# Patient Record
Sex: MALE | Race: WHITE | Employment: OTHER | URBAN - NONMETROPOLITAN AREA
[De-identification: names, ages, dates, MRNs, and addresses within clinical notes are randomized per-mention and may not be internally consistent; named-entity substitution may affect disease eponyms.]

---

## 2023-01-01 ENCOUNTER — APPOINTMENT (OUTPATIENT)
Dept: NUCLEAR MEDICINE | Age: 88
DRG: 207 | End: 2023-01-01
Attending: INTERNAL MEDICINE
Payer: MEDICARE

## 2023-01-01 ENCOUNTER — APPOINTMENT (OUTPATIENT)
Dept: GENERAL RADIOLOGY | Age: 88
DRG: 207 | End: 2023-01-01
Attending: INTERNAL MEDICINE
Payer: MEDICARE

## 2023-01-01 ENCOUNTER — APPOINTMENT (OUTPATIENT)
Dept: ULTRASOUND IMAGING | Age: 88
DRG: 207 | End: 2023-01-01
Attending: INTERNAL MEDICINE
Payer: MEDICARE

## 2023-01-01 ENCOUNTER — APPOINTMENT (OUTPATIENT)
Dept: CT IMAGING | Age: 88
DRG: 207 | End: 2023-01-01
Attending: INTERNAL MEDICINE
Payer: MEDICARE

## 2023-01-01 ENCOUNTER — APPOINTMENT (OUTPATIENT)
Dept: INTERVENTIONAL RADIOLOGY/VASCULAR | Age: 88
DRG: 207 | End: 2023-01-01
Attending: INTERNAL MEDICINE
Payer: MEDICARE

## 2023-01-01 ENCOUNTER — HOSPITAL ENCOUNTER (INPATIENT)
Age: 88
LOS: 25 days | Discharge: NURSING, REHAB OR LONG-TERM CARE | DRG: 207 | End: 2024-01-02
Attending: INTERNAL MEDICINE | Admitting: INTERNAL MEDICINE
Payer: MEDICARE

## 2023-01-01 DIAGNOSIS — I50.23 ACUTE ON CHRONIC HFREF (HEART FAILURE WITH REDUCED EJECTION FRACTION) (HCC): ICD-10-CM

## 2023-01-01 DIAGNOSIS — J96.01 ACUTE HYPOXIC RESPIRATORY FAILURE (HCC): Primary | ICD-10-CM

## 2023-01-01 DIAGNOSIS — J96.01 ACUTE RESPIRATORY FAILURE WITH HYPOXIA (HCC): ICD-10-CM

## 2023-01-01 LAB
ABO: NORMAL
ABO: NORMAL
ACINETOBACTER CALCOACETICUS-BAUMANNII BY PCR: NOT DETECTED
ACINETOBACTER CALCOACETICUS-BAUMANNII BY PCR: NOT DETECTED
ALBUMIN SERPL BCG-MCNC: 2.4 G/DL (ref 3.5–5.1)
ALBUMIN SERPL BCG-MCNC: 2.5 G/DL (ref 3.5–5.1)
ALBUMIN SERPL BCG-MCNC: 2.8 G/DL (ref 3.5–5.1)
ALDOLASE SERPL-CCNC: 8.9 U/L (ref 1.2–7.6)
ALP SERPL-CCNC: 63 U/L (ref 38–126)
ALP SERPL-CCNC: 87 U/L (ref 38–126)
ALT SERPL W/O P-5'-P-CCNC: 7 U/L (ref 11–66)
ALT SERPL W/O P-5'-P-CCNC: < 5 U/L (ref 11–66)
AMORPH SED URNS QL MICRO: ABNORMAL
ANCA AB PATTERN SER IF-IMP: NORMAL
ANCA AB PATTERN SER IF-IMP: NORMAL
ANCA IGG TITR SER IF: NORMAL {TITER}
ANCA IGG TITR SER IF: NORMAL {TITER}
ANION GAP SERPL CALC-SCNC: 10 MEQ/L (ref 8–16)
ANION GAP SERPL CALC-SCNC: 11 MEQ/L (ref 8–16)
ANION GAP SERPL CALC-SCNC: 12 MEQ/L (ref 8–16)
ANION GAP SERPL CALC-SCNC: 13 MEQ/L (ref 8–16)
ANION GAP SERPL CALC-SCNC: 14 MEQ/L (ref 8–16)
ANION GAP SERPL CALC-SCNC: 14 MEQ/L (ref 8–16)
ANION GAP SERPL CALC-SCNC: 15 MEQ/L (ref 8–16)
ANION GAP SERPL CALC-SCNC: 16 MEQ/L (ref 8–16)
ANION GAP SERPL CALC-SCNC: 7 MEQ/L (ref 8–16)
ANION GAP SERPL CALC-SCNC: 9 MEQ/L (ref 8–16)
ANION GAP SERPL CALC-SCNC: 9 MEQ/L (ref 8–16)
ANTIBODY SCREEN: NORMAL
ANTIBODY SCREEN: NORMAL
APTT PPP: 106.2 SECONDS (ref 22–38)
APTT PPP: 113.4 SECONDS (ref 22–38)
APTT PPP: 113.9 SECONDS (ref 22–38)
APTT PPP: 117.5 SECONDS (ref 22–38)
APTT PPP: 26.2 SECONDS (ref 22–38)
APTT PPP: 34.7 SECONDS (ref 22–38)
APTT PPP: 37.1 SECONDS (ref 22–38)
APTT PPP: 45 SECONDS (ref 22–38)
APTT PPP: 48 SECONDS (ref 22–38)
APTT PPP: 48.4 SECONDS (ref 22–38)
APTT PPP: 49.6 SECONDS (ref 22–38)
APTT PPP: 50.2 SECONDS (ref 22–38)
APTT PPP: 50.9 SECONDS (ref 22–38)
APTT PPP: 51.6 SECONDS (ref 22–38)
APTT PPP: 62.5 SECONDS (ref 22–38)
APTT PPP: 63.5 SECONDS (ref 22–38)
APTT PPP: 65.6 SECONDS (ref 22–38)
APTT PPP: 68.5 SECONDS (ref 22–38)
APTT PPP: 68.7 SECONDS (ref 22–38)
APTT PPP: 68.8 SECONDS (ref 22–38)
APTT PPP: 70.9 SECONDS (ref 22–38)
APTT PPP: 72.8 SECONDS (ref 22–38)
APTT PPP: 75 SECONDS (ref 22–38)
APTT PPP: 77.7 SECONDS (ref 22–38)
APTT PPP: 79.3 SECONDS (ref 22–38)
APTT PPP: 81.9 SECONDS (ref 22–38)
APTT PPP: 83 SECONDS (ref 22–38)
APTT PPP: 84.8 SECONDS (ref 22–38)
APTT PPP: 85.4 SECONDS (ref 22–38)
APTT PPP: 87.8 SECONDS (ref 22–38)
APTT PPP: 88.9 SECONDS (ref 22–38)
APTT PPP: 89.1 SECONDS (ref 22–38)
APTT PPP: 91 SECONDS (ref 22–38)
APTT PPP: 91.4 SECONDS (ref 22–38)
APTT PPP: 94.5 SECONDS (ref 22–38)
APTT PPP: 95 SECONDS (ref 22–38)
ARTERIAL PATENCY WRIST A: POSITIVE
AST SERPL-CCNC: 15 U/L (ref 5–40)
AST SERPL-CCNC: 9 U/L (ref 5–40)
B-OH-BUTYR SERPL-MSCNC: 1.59 MG/DL (ref 0.2–2.81)
BACTERIA BLD AEROBE CULT: NORMAL
BACTERIA BLD AEROBE CULT: NORMAL
BACTERIA SPEC AEROBE CULT: NORMAL
BACTERIA SPEC RESP CULT: ABNORMAL
BACTERIA: ABNORMAL
BASE EXCESS BLDA CALC-SCNC: 1.4 MMOL/L (ref -2.5–2.5)
BASE EXCESS BLDA CALC-SCNC: 1.8 MMOL/L (ref -2.5–2.5)
BASE EXCESS BLDA CALC-SCNC: 3.1 MMOL/L (ref -2.5–2.5)
BASOPHILS ABSOLUTE: 0 THOU/MM3 (ref 0–0.1)
BASOPHILS ABSOLUTE: 0.1 THOU/MM3 (ref 0–0.1)
BASOPHILS NFR BLD AUTO: 0.1 %
BASOPHILS NFR BLD AUTO: 0.1 %
BASOPHILS NFR BLD AUTO: 0.3 %
BASOPHILS NFR BLD AUTO: 0.4 %
BASOPHILS NFR BLD AUTO: 0.5 %
BDY SITE: ABNORMAL
BILIRUB CONJ SERPL-MCNC: < 0.2 MG/DL (ref 0–0.3)
BILIRUB SERPL-MCNC: 0.2 MG/DL (ref 0.3–1.2)
BILIRUB SERPL-MCNC: 0.3 MG/DL (ref 0.3–1.2)
BILIRUB UR QL STRIP: NEGATIVE
BLACTX-M ISLT/SPM QL: NOT DETECTED
BLACTX-M ISLT/SPM QL: NOT DETECTED
BLAIMP ISLT/SPM QL: NOT DETECTED
BLAIMP ISLT/SPM QL: NOT DETECTED
BLAKPC ISLT/SPM QL: NOT DETECTED
BLAKPC ISLT/SPM QL: NOT DETECTED
BLAOXA-48-LIKE ISLT/SPM QL: NOT DETECTED
BLAOXA-48-LIKE ISLT/SPM QL: NOT DETECTED
BLAVIM ISLT/SPM QL: NOT DETECTED
BLAVIM ISLT/SPM QL: NOT DETECTED
BUN SERPL-MCNC: 62 MG/DL (ref 7–22)
BUN SERPL-MCNC: 67 MG/DL (ref 7–22)
BUN SERPL-MCNC: 69 MG/DL (ref 7–22)
BUN SERPL-MCNC: 71 MG/DL (ref 7–22)
BUN SERPL-MCNC: 71 MG/DL (ref 7–22)
BUN SERPL-MCNC: 74 MG/DL (ref 7–22)
BUN SERPL-MCNC: 74 MG/DL (ref 7–22)
BUN SERPL-MCNC: 75 MG/DL (ref 7–22)
BUN SERPL-MCNC: 75 MG/DL (ref 7–22)
BUN SERPL-MCNC: 76 MG/DL (ref 7–22)
BUN SERPL-MCNC: 78 MG/DL (ref 7–22)
BUN SERPL-MCNC: 78 MG/DL (ref 7–22)
BUN SERPL-MCNC: 79 MG/DL (ref 7–22)
BUN SERPL-MCNC: 80 MG/DL (ref 7–22)
BUN SERPL-MCNC: 80 MG/DL (ref 7–22)
BUN SERPL-MCNC: 81 MG/DL (ref 7–22)
BUN SERPL-MCNC: 81 MG/DL (ref 7–22)
BUN SERPL-MCNC: 83 MG/DL (ref 7–22)
BUN SERPL-MCNC: 84 MG/DL (ref 7–22)
BUN SERPL-MCNC: 84 MG/DL (ref 7–22)
BUN SERPL-MCNC: 85 MG/DL (ref 7–22)
BUN SERPL-MCNC: 86 MG/DL (ref 7–22)
BUN SERPL-MCNC: 87 MG/DL (ref 7–22)
BUN SERPL-MCNC: 88 MG/DL (ref 7–22)
BUN SERPL-MCNC: 88 MG/DL (ref 7–22)
BUN SERPL-MCNC: 90 MG/DL (ref 7–22)
C PNEUM DNA LOWER RESP QL NAA+NON-PROBE: NOT DETECTED
C PNEUM DNA LOWER RESP QL NAA+NON-PROBE: NOT DETECTED
C3C SERPL-MCNC: 104 MG/DL (ref 90–180)
C4 SERPL-MCNC: 31 MG/DL (ref 10–40)
CA-I BLD ISE-SCNC: 0.92 MMOL/L (ref 1.12–1.32)
CA-I BLD ISE-SCNC: 0.98 MMOL/L (ref 1.12–1.32)
CA-I BLD ISE-SCNC: 0.99 MMOL/L (ref 1.12–1.32)
CA-I BLD ISE-SCNC: 1 MMOL/L (ref 1.12–1.32)
CA-I BLD ISE-SCNC: 1.01 MMOL/L (ref 1.12–1.32)
CA-I BLD ISE-SCNC: 1.04 MMOL/L (ref 1.12–1.32)
CA-I BLD ISE-SCNC: 1.07 MMOL/L (ref 1.12–1.32)
CA-I BLD ISE-SCNC: 1.08 MMOL/L (ref 1.12–1.32)
CA-I BLD ISE-SCNC: 1.09 MMOL/L (ref 1.12–1.32)
CA-I BLD ISE-SCNC: 1.09 MMOL/L (ref 1.12–1.32)
CALCIUM SERPL-MCNC: 7.3 MG/DL (ref 8.5–10.5)
CALCIUM SERPL-MCNC: 7.4 MG/DL (ref 8.5–10.5)
CALCIUM SERPL-MCNC: 7.5 MG/DL (ref 8.5–10.5)
CALCIUM SERPL-MCNC: 7.6 MG/DL (ref 8.5–10.5)
CALCIUM SERPL-MCNC: 7.7 MG/DL (ref 8.5–10.5)
CALCIUM SERPL-MCNC: 7.8 MG/DL (ref 8.5–10.5)
CALCIUM SERPL-MCNC: 7.9 MG/DL (ref 8.5–10.5)
CALCIUM SERPL-MCNC: 8 MG/DL (ref 8.5–10.5)
CALCIUM SERPL-MCNC: 8.2 MG/DL (ref 8.5–10.5)
CALCIUM SERPL-MCNC: 8.3 MG/DL (ref 8.5–10.5)
CASTS #/AREA URNS LPF: ABNORMAL /LPF
CHARACTER UR: CLEAR
CHARCOAL URNS QL MICRO: ABNORMAL
CHARCOAL URNS QL MICRO: ABNORMAL
CHLORIDE 24H UR-SRATE: < 20 MEQ/L
CHLORIDE SERPL-SCNC: 101 MEQ/L (ref 98–111)
CHLORIDE SERPL-SCNC: 102 MEQ/L (ref 98–111)
CHLORIDE SERPL-SCNC: 102 MEQ/L (ref 98–111)
CHLORIDE SERPL-SCNC: 104 MEQ/L (ref 98–111)
CHLORIDE SERPL-SCNC: 105 MEQ/L (ref 98–111)
CHLORIDE SERPL-SCNC: 105 MEQ/L (ref 98–111)
CHLORIDE SERPL-SCNC: 106 MEQ/L (ref 98–111)
CHLORIDE SERPL-SCNC: 106 MEQ/L (ref 98–111)
CHLORIDE SERPL-SCNC: 107 MEQ/L (ref 98–111)
CHLORIDE SERPL-SCNC: 108 MEQ/L (ref 98–111)
CHLORIDE SERPL-SCNC: 109 MEQ/L (ref 98–111)
CHLORIDE SERPL-SCNC: 93 MEQ/L (ref 98–111)
CHLORIDE SERPL-SCNC: 94 MEQ/L (ref 98–111)
CHLORIDE SERPL-SCNC: 94 MEQ/L (ref 98–111)
CHLORIDE SERPL-SCNC: 96 MEQ/L (ref 98–111)
CHLORIDE SERPL-SCNC: 96 MEQ/L (ref 98–111)
CHLORIDE SERPL-SCNC: 98 MEQ/L (ref 98–111)
CHLORIDE SERPL-SCNC: 98 MEQ/L (ref 98–111)
CK SERPL-CCNC: 43 U/L (ref 55–170)
CK SERPL-CCNC: 53 U/L (ref 55–170)
CO2 SERPL-SCNC: 23 MEQ/L (ref 23–33)
CO2 SERPL-SCNC: 23 MEQ/L (ref 23–33)
CO2 SERPL-SCNC: 24 MEQ/L (ref 23–33)
CO2 SERPL-SCNC: 25 MEQ/L (ref 23–33)
CO2 SERPL-SCNC: 26 MEQ/L (ref 23–33)
CO2 SERPL-SCNC: 27 MEQ/L (ref 23–33)
CO2 SERPL-SCNC: 28 MEQ/L (ref 23–33)
CO2 SERPL-SCNC: 29 MEQ/L (ref 23–33)
CO2 SERPL-SCNC: 30 MEQ/L (ref 23–33)
CO2 SERPL-SCNC: 31 MEQ/L (ref 23–33)
CO2 SERPL-SCNC: 31 MEQ/L (ref 23–33)
COLLECTED BY:: ABNORMAL
COLOR UR: YELLOW
CREAT SERPL-MCNC: 2.4 MG/DL (ref 0.4–1.2)
CREAT SERPL-MCNC: 2.4 MG/DL (ref 0.4–1.2)
CREAT SERPL-MCNC: 2.5 MG/DL (ref 0.4–1.2)
CREAT SERPL-MCNC: 2.5 MG/DL (ref 0.4–1.2)
CREAT SERPL-MCNC: 2.6 MG/DL (ref 0.4–1.2)
CREAT SERPL-MCNC: 2.7 MG/DL (ref 0.4–1.2)
CREAT SERPL-MCNC: 2.8 MG/DL (ref 0.4–1.2)
CREAT SERPL-MCNC: 2.9 MG/DL (ref 0.4–1.2)
CREAT SERPL-MCNC: 3 MG/DL (ref 0.4–1.2)
CREAT SERPL-MCNC: 3.1 MG/DL (ref 0.4–1.2)
CREAT SERPL-MCNC: 3.2 MG/DL (ref 0.4–1.2)
CREAT SERPL-MCNC: 3.3 MG/DL (ref 0.4–1.2)
CREAT SERPL-MCNC: 3.4 MG/DL (ref 0.4–1.2)
CREAT SERPL-MCNC: 3.6 MG/DL (ref 0.4–1.2)
CREAT SERPL-MCNC: 3.6 MG/DL (ref 0.4–1.2)
CREAT UR-MCNC: 59.3 MG/DL
CREAT UR-MCNC: 61.9 MG/DL
CREAT UR-MCNC: 61.9 MG/DL
CRYSTALS URNS QL MICRO: ABNORMAL
D DIMER PPP IA.FEU-MCNC: 3732 NG/ML FEU (ref 0–500)
DEPRECATED MEAN GLUCOSE BLD GHB EST-ACNC: 123 MG/DL (ref 70–126)
DEPRECATED RDW RBC AUTO: 49.8 FL (ref 35–45)
DEPRECATED RDW RBC AUTO: 50.1 FL (ref 35–45)
DEPRECATED RDW RBC AUTO: 50.7 FL (ref 35–45)
DEPRECATED RDW RBC AUTO: 51 FL (ref 35–45)
DEPRECATED RDW RBC AUTO: 51.1 FL (ref 35–45)
DEPRECATED RDW RBC AUTO: 51.4 FL (ref 35–45)
DEPRECATED RDW RBC AUTO: 51.6 FL (ref 35–45)
DEPRECATED RDW RBC AUTO: 51.8 FL (ref 35–45)
DEPRECATED RDW RBC AUTO: 53 FL (ref 35–45)
DEPRECATED RDW RBC AUTO: 53.3 FL (ref 35–45)
DEPRECATED RDW RBC AUTO: 53.9 FL (ref 35–45)
DEPRECATED RDW RBC AUTO: 54 FL (ref 35–45)
DEPRECATED RDW RBC AUTO: 54.1 FL (ref 35–45)
DEPRECATED RDW RBC AUTO: 54.1 FL (ref 35–45)
DEPRECATED RDW RBC AUTO: 54.2 FL (ref 35–45)
DEPRECATED RDW RBC AUTO: 54.3 FL (ref 35–45)
DEPRECATED RDW RBC AUTO: 55.6 FL (ref 35–45)
DEPRECATED RDW RBC AUTO: 56 FL (ref 35–45)
DEPRECATED RDW RBC AUTO: 57.5 FL (ref 35–45)
DEPRECATED RDW RBC AUTO: 57.8 FL (ref 35–45)
DEPRECATED RDW RBC AUTO: 57.9 FL (ref 35–45)
DEPRECATED RDW RBC AUTO: 58.4 FL (ref 35–45)
DEPRECATED RDW RBC AUTO: 58.4 FL (ref 35–45)
DEPRECATED RDW RBC AUTO: 59.4 FL (ref 35–45)
DEPRECATED RDW RBC AUTO: 59.9 FL (ref 35–45)
DEPRECATED RDW RBC AUTO: 61.1 FL (ref 35–45)
DEVICE: ABNORMAL
EKG ATRIAL RATE: 69 BPM
EKG ATRIAL RATE: 70 BPM
EKG ATRIAL RATE: 76 BPM
EKG ATRIAL RATE: 86 BPM
EKG ATRIAL RATE: 87 BPM
EKG P AXIS: -23 DEGREES
EKG P AXIS: 33 DEGREES
EKG P AXIS: 33 DEGREES
EKG P AXIS: 39 DEGREES
EKG P AXIS: 46 DEGREES
EKG P-R INTERVAL: 146 MS
EKG P-R INTERVAL: 150 MS
EKG P-R INTERVAL: 156 MS
EKG P-R INTERVAL: 162 MS
EKG P-R INTERVAL: 168 MS
EKG Q-T INTERVAL: 386 MS
EKG Q-T INTERVAL: 396 MS
EKG Q-T INTERVAL: 408 MS
EKG Q-T INTERVAL: 432 MS
EKG Q-T INTERVAL: 444 MS
EKG Q-T INTERVAL: 450 MS
EKG QRS DURATION: 100 MS
EKG QRS DURATION: 108 MS
EKG QRS DURATION: 98 MS
EKG QTC CALCULATION (BAZETT): 436 MS
EKG QTC CALCULATION (BAZETT): 466 MS
EKG QTC CALCULATION (BAZETT): 473 MS
EKG QTC CALCULATION (BAZETT): 482 MS
EKG QTC CALCULATION (BAZETT): 490 MS
EKG QTC CALCULATION (BAZETT): 499 MS
EKG R AXIS: 18 DEGREES
EKG R AXIS: 23 DEGREES
EKG R AXIS: 29 DEGREES
EKG R AXIS: 37 DEGREES
EKG R AXIS: 47 DEGREES
EKG R AXIS: 50 DEGREES
EKG T AXIS: 46 DEGREES
EKG T AXIS: 51 DEGREES
EKG T AXIS: 52 DEGREES
EKG T AXIS: 53 DEGREES
EKG T AXIS: 63 DEGREES
EKG T AXIS: 64 DEGREES
EKG VENTRICULAR RATE: 69 BPM
EKG VENTRICULAR RATE: 70 BPM
EKG VENTRICULAR RATE: 76 BPM
EKG VENTRICULAR RATE: 77 BPM
EKG VENTRICULAR RATE: 86 BPM
EKG VENTRICULAR RATE: 87 BPM
ENA JO1 IGG SER-ACNC: 1 AU/ML (ref 0–40)
ENTEROBACTER CLOACAE COMPLEX BY PCR: NOT DETECTED
ENTEROBACTER CLOACAE COMPLEX BY PCR: NOT DETECTED
EOSINOPHIL NFR BLD AUTO: 0.1 %
EOSINOPHIL NFR BLD AUTO: 0.4 %
EOSINOPHIL NFR BLD AUTO: 0.7 %
EOSINOPHIL NFR BLD AUTO: 1 %
EOSINOPHIL NFR BLD AUTO: 1.6 %
EOSINOPHIL NFR BLD AUTO: 3.2 %
EOSINOPHIL NFR BLD AUTO: 3.3 %
EOSINOPHIL NFR BLD AUTO: 3.5 %
EOSINOPHIL NFR BLD AUTO: 3.9 %
EOSINOPHIL NFR BLD AUTO: 4 %
EOSINOPHIL NFR BLD AUTO: 4.5 %
EOSINOPHIL NFR BLD AUTO: 4.5 %
EOSINOPHIL SMEAR, URINE: NORMAL
EOSINOPHILS ABSOLUTE: 0 THOU/MM3 (ref 0–0.4)
EOSINOPHILS ABSOLUTE: 0.1 THOU/MM3 (ref 0–0.4)
EOSINOPHILS ABSOLUTE: 0.2 THOU/MM3 (ref 0–0.4)
EOSINOPHILS ABSOLUTE: 0.4 THOU/MM3 (ref 0–0.4)
EOSINOPHILS ABSOLUTE: 0.4 THOU/MM3 (ref 0–0.4)
EOSINOPHILS ABSOLUTE: 0.5 THOU/MM3 (ref 0–0.4)
EPITHELIAL CELLS, UA: ABNORMAL /HPF
ERYTHROCYTE [DISTWIDTH] IN BLOOD BY AUTOMATED COUNT: 15.3 % (ref 11.5–14.5)
ERYTHROCYTE [DISTWIDTH] IN BLOOD BY AUTOMATED COUNT: 15.4 % (ref 11.5–14.5)
ERYTHROCYTE [DISTWIDTH] IN BLOOD BY AUTOMATED COUNT: 15.6 % (ref 11.5–14.5)
ERYTHROCYTE [DISTWIDTH] IN BLOOD BY AUTOMATED COUNT: 15.7 % (ref 11.5–14.5)
ERYTHROCYTE [DISTWIDTH] IN BLOOD BY AUTOMATED COUNT: 15.8 % (ref 11.5–14.5)
ERYTHROCYTE [DISTWIDTH] IN BLOOD BY AUTOMATED COUNT: 16.1 % (ref 11.5–14.5)
ERYTHROCYTE [DISTWIDTH] IN BLOOD BY AUTOMATED COUNT: 16.1 % (ref 11.5–14.5)
ERYTHROCYTE [DISTWIDTH] IN BLOOD BY AUTOMATED COUNT: 16.2 % (ref 11.5–14.5)
ERYTHROCYTE [DISTWIDTH] IN BLOOD BY AUTOMATED COUNT: 16.4 % (ref 11.5–14.5)
ERYTHROCYTE [DISTWIDTH] IN BLOOD BY AUTOMATED COUNT: 16.4 % (ref 11.5–14.5)
ERYTHROCYTE [DISTWIDTH] IN BLOOD BY AUTOMATED COUNT: 16.7 % (ref 11.5–14.5)
ERYTHROCYTE [DISTWIDTH] IN BLOOD BY AUTOMATED COUNT: 16.7 % (ref 11.5–14.5)
ERYTHROCYTE [DISTWIDTH] IN BLOOD BY AUTOMATED COUNT: 16.8 % (ref 11.5–14.5)
ERYTHROCYTE [DISTWIDTH] IN BLOOD BY AUTOMATED COUNT: 17.2 % (ref 11.5–14.5)
ERYTHROCYTE [DISTWIDTH] IN BLOOD BY AUTOMATED COUNT: 17.2 % (ref 11.5–14.5)
ERYTHROCYTE [DISTWIDTH] IN BLOOD BY AUTOMATED COUNT: 17.3 % (ref 11.5–14.5)
ERYTHROCYTE [DISTWIDTH] IN BLOOD BY AUTOMATED COUNT: 17.4 % (ref 11.5–14.5)
ERYTHROCYTE [DISTWIDTH] IN BLOOD BY AUTOMATED COUNT: 17.5 % (ref 11.5–14.5)
ERYTHROCYTE [DISTWIDTH] IN BLOOD BY AUTOMATED COUNT: 17.6 % (ref 11.5–14.5)
ERYTHROCYTE [DISTWIDTH] IN BLOOD BY AUTOMATED COUNT: 17.7 % (ref 11.5–14.5)
ERYTHROCYTE [DISTWIDTH] IN BLOOD BY AUTOMATED COUNT: 17.7 % (ref 11.5–14.5)
ERYTHROCYTE [SEDIMENTATION RATE] IN BLOOD BY WESTERGREN METHOD: 125 MM/HR (ref 0–10)
ESCHERICHIA COLI BY PCR: DETECTED
ESCHERICHIA COLI BY PCR: DETECTED
FIO2 ON VENT O2 ANALYZER: 15 %
FIO2 ON VENT O2 ANALYZER: 80 %
FIO2 ON VENT O2 ANALYZER: 80 %
FLUAV RNA LOWER RESP QL NAA+NON-PROBE: NOT DETECTED
FLUAV RNA LOWER RESP QL NAA+NON-PROBE: NOT DETECTED
FLUAV RNA RESP QL NAA+PROBE: NOT DETECTED
FLUBV RNA LOWER RESP QL NAA+NON-PROBE: NOT DETECTED
FLUBV RNA LOWER RESP QL NAA+NON-PROBE: NOT DETECTED
FLUBV RNA RESP QL NAA+PROBE: NOT DETECTED
GFR SERPL CREATININE-BSD FRML MDRD: 15 ML/MIN/1.73M2
GFR SERPL CREATININE-BSD FRML MDRD: 15 ML/MIN/1.73M2
GFR SERPL CREATININE-BSD FRML MDRD: 17 ML/MIN/1.73M2
GFR SERPL CREATININE-BSD FRML MDRD: 18 ML/MIN/1.73M2
GFR SERPL CREATININE-BSD FRML MDRD: 19 ML/MIN/1.73M2
GFR SERPL CREATININE-BSD FRML MDRD: 20 ML/MIN/1.73M2
GFR SERPL CREATININE-BSD FRML MDRD: 21 ML/MIN/1.73M2
GFR SERPL CREATININE-BSD FRML MDRD: 22 ML/MIN/1.73M2
GFR SERPL CREATININE-BSD FRML MDRD: 23 ML/MIN/1.73M2
GFR SERPL CREATININE-BSD FRML MDRD: 24 ML/MIN/1.73M2
GFR SERPL CREATININE-BSD FRML MDRD: 24 ML/MIN/1.73M2
GFR SERPL CREATININE-BSD FRML MDRD: 25 ML/MIN/1.73M2
GFR SERPL CREATININE-BSD FRML MDRD: 25 ML/MIN/1.73M2
GLOMERULAR BASEMENT MEMBRANE ANTIBODY: NORMAL
GLUCOSE BLD STRIP.AUTO-MCNC: 110 MG/DL (ref 70–108)
GLUCOSE BLD STRIP.AUTO-MCNC: 110 MG/DL (ref 70–108)
GLUCOSE BLD STRIP.AUTO-MCNC: 123 MG/DL (ref 70–108)
GLUCOSE BLD STRIP.AUTO-MCNC: 132 MG/DL (ref 70–108)
GLUCOSE BLD STRIP.AUTO-MCNC: 133 MG/DL (ref 70–108)
GLUCOSE BLD STRIP.AUTO-MCNC: 134 MG/DL (ref 70–108)
GLUCOSE BLD STRIP.AUTO-MCNC: 136 MG/DL (ref 70–108)
GLUCOSE BLD STRIP.AUTO-MCNC: 137 MG/DL (ref 70–108)
GLUCOSE BLD STRIP.AUTO-MCNC: 138 MG/DL (ref 70–108)
GLUCOSE BLD STRIP.AUTO-MCNC: 140 MG/DL (ref 70–108)
GLUCOSE BLD STRIP.AUTO-MCNC: 140 MG/DL (ref 70–108)
GLUCOSE BLD STRIP.AUTO-MCNC: 141 MG/DL (ref 70–108)
GLUCOSE BLD STRIP.AUTO-MCNC: 147 MG/DL (ref 70–108)
GLUCOSE BLD STRIP.AUTO-MCNC: 148 MG/DL (ref 70–108)
GLUCOSE BLD STRIP.AUTO-MCNC: 149 MG/DL (ref 70–108)
GLUCOSE BLD STRIP.AUTO-MCNC: 150 MG/DL (ref 70–108)
GLUCOSE BLD STRIP.AUTO-MCNC: 151 MG/DL (ref 70–108)
GLUCOSE BLD STRIP.AUTO-MCNC: 151 MG/DL (ref 70–108)
GLUCOSE BLD STRIP.AUTO-MCNC: 154 MG/DL (ref 70–108)
GLUCOSE BLD STRIP.AUTO-MCNC: 155 MG/DL (ref 70–108)
GLUCOSE BLD STRIP.AUTO-MCNC: 157 MG/DL (ref 70–108)
GLUCOSE BLD STRIP.AUTO-MCNC: 159 MG/DL (ref 70–108)
GLUCOSE BLD STRIP.AUTO-MCNC: 159 MG/DL (ref 70–108)
GLUCOSE BLD STRIP.AUTO-MCNC: 161 MG/DL (ref 70–108)
GLUCOSE BLD STRIP.AUTO-MCNC: 162 MG/DL (ref 70–108)
GLUCOSE BLD STRIP.AUTO-MCNC: 162 MG/DL (ref 70–108)
GLUCOSE BLD STRIP.AUTO-MCNC: 163 MG/DL (ref 70–108)
GLUCOSE BLD STRIP.AUTO-MCNC: 167 MG/DL (ref 70–108)
GLUCOSE BLD STRIP.AUTO-MCNC: 167 MG/DL (ref 70–108)
GLUCOSE BLD STRIP.AUTO-MCNC: 168 MG/DL (ref 70–108)
GLUCOSE BLD STRIP.AUTO-MCNC: 170 MG/DL (ref 70–108)
GLUCOSE BLD STRIP.AUTO-MCNC: 170 MG/DL (ref 70–108)
GLUCOSE BLD STRIP.AUTO-MCNC: 171 MG/DL (ref 70–108)
GLUCOSE BLD STRIP.AUTO-MCNC: 172 MG/DL (ref 70–108)
GLUCOSE BLD STRIP.AUTO-MCNC: 173 MG/DL (ref 70–108)
GLUCOSE BLD STRIP.AUTO-MCNC: 174 MG/DL (ref 70–108)
GLUCOSE BLD STRIP.AUTO-MCNC: 174 MG/DL (ref 70–108)
GLUCOSE BLD STRIP.AUTO-MCNC: 175 MG/DL (ref 70–108)
GLUCOSE BLD STRIP.AUTO-MCNC: 176 MG/DL (ref 70–108)
GLUCOSE BLD STRIP.AUTO-MCNC: 177 MG/DL (ref 70–108)
GLUCOSE BLD STRIP.AUTO-MCNC: 178 MG/DL (ref 70–108)
GLUCOSE BLD STRIP.AUTO-MCNC: 179 MG/DL (ref 70–108)
GLUCOSE BLD STRIP.AUTO-MCNC: 179 MG/DL (ref 70–108)
GLUCOSE BLD STRIP.AUTO-MCNC: 181 MG/DL (ref 70–108)
GLUCOSE BLD STRIP.AUTO-MCNC: 181 MG/DL (ref 70–108)
GLUCOSE BLD STRIP.AUTO-MCNC: 182 MG/DL (ref 70–108)
GLUCOSE BLD STRIP.AUTO-MCNC: 182 MG/DL (ref 70–108)
GLUCOSE BLD STRIP.AUTO-MCNC: 183 MG/DL (ref 70–108)
GLUCOSE BLD STRIP.AUTO-MCNC: 183 MG/DL (ref 70–108)
GLUCOSE BLD STRIP.AUTO-MCNC: 184 MG/DL (ref 70–108)
GLUCOSE BLD STRIP.AUTO-MCNC: 184 MG/DL (ref 70–108)
GLUCOSE BLD STRIP.AUTO-MCNC: 186 MG/DL (ref 70–108)
GLUCOSE BLD STRIP.AUTO-MCNC: 187 MG/DL (ref 70–108)
GLUCOSE BLD STRIP.AUTO-MCNC: 188 MG/DL (ref 70–108)
GLUCOSE BLD STRIP.AUTO-MCNC: 189 MG/DL (ref 70–108)
GLUCOSE BLD STRIP.AUTO-MCNC: 189 MG/DL (ref 70–108)
GLUCOSE BLD STRIP.AUTO-MCNC: 190 MG/DL (ref 70–108)
GLUCOSE BLD STRIP.AUTO-MCNC: 191 MG/DL (ref 70–108)
GLUCOSE BLD STRIP.AUTO-MCNC: 191 MG/DL (ref 70–108)
GLUCOSE BLD STRIP.AUTO-MCNC: 192 MG/DL (ref 70–108)
GLUCOSE BLD STRIP.AUTO-MCNC: 192 MG/DL (ref 70–108)
GLUCOSE BLD STRIP.AUTO-MCNC: 193 MG/DL (ref 70–108)
GLUCOSE BLD STRIP.AUTO-MCNC: 194 MG/DL (ref 70–108)
GLUCOSE BLD STRIP.AUTO-MCNC: 194 MG/DL (ref 70–108)
GLUCOSE BLD STRIP.AUTO-MCNC: 196 MG/DL (ref 70–108)
GLUCOSE BLD STRIP.AUTO-MCNC: 197 MG/DL (ref 70–108)
GLUCOSE BLD STRIP.AUTO-MCNC: 198 MG/DL (ref 70–108)
GLUCOSE BLD STRIP.AUTO-MCNC: 198 MG/DL (ref 70–108)
GLUCOSE BLD STRIP.AUTO-MCNC: 201 MG/DL (ref 70–108)
GLUCOSE BLD STRIP.AUTO-MCNC: 205 MG/DL (ref 70–108)
GLUCOSE BLD STRIP.AUTO-MCNC: 209 MG/DL (ref 70–108)
GLUCOSE BLD STRIP.AUTO-MCNC: 210 MG/DL (ref 70–108)
GLUCOSE BLD STRIP.AUTO-MCNC: 215 MG/DL (ref 70–108)
GLUCOSE BLD STRIP.AUTO-MCNC: 216 MG/DL (ref 70–108)
GLUCOSE BLD STRIP.AUTO-MCNC: 221 MG/DL (ref 70–108)
GLUCOSE BLD STRIP.AUTO-MCNC: 221 MG/DL (ref 70–108)
GLUCOSE BLD STRIP.AUTO-MCNC: 226 MG/DL (ref 70–108)
GLUCOSE BLD STRIP.AUTO-MCNC: 230 MG/DL (ref 70–108)
GLUCOSE BLD STRIP.AUTO-MCNC: 232 MG/DL (ref 70–108)
GLUCOSE BLD STRIP.AUTO-MCNC: 239 MG/DL (ref 70–108)
GLUCOSE BLD STRIP.AUTO-MCNC: 247 MG/DL (ref 70–108)
GLUCOSE BLD STRIP.AUTO-MCNC: 248 MG/DL (ref 70–108)
GLUCOSE BLD STRIP.AUTO-MCNC: 250 MG/DL (ref 70–108)
GLUCOSE BLD STRIP.AUTO-MCNC: 264 MG/DL (ref 70–108)
GLUCOSE BLD STRIP.AUTO-MCNC: 272 MG/DL (ref 70–108)
GLUCOSE BLD STRIP.AUTO-MCNC: 301 MG/DL (ref 70–108)
GLUCOSE SERPL-MCNC: 106 MG/DL (ref 70–108)
GLUCOSE SERPL-MCNC: 116 MG/DL (ref 70–108)
GLUCOSE SERPL-MCNC: 128 MG/DL (ref 70–108)
GLUCOSE SERPL-MCNC: 135 MG/DL (ref 70–108)
GLUCOSE SERPL-MCNC: 136 MG/DL (ref 70–108)
GLUCOSE SERPL-MCNC: 138 MG/DL (ref 70–108)
GLUCOSE SERPL-MCNC: 138 MG/DL (ref 70–108)
GLUCOSE SERPL-MCNC: 144 MG/DL (ref 70–108)
GLUCOSE SERPL-MCNC: 153 MG/DL (ref 70–108)
GLUCOSE SERPL-MCNC: 156 MG/DL (ref 70–108)
GLUCOSE SERPL-MCNC: 156 MG/DL (ref 70–108)
GLUCOSE SERPL-MCNC: 158 MG/DL (ref 70–108)
GLUCOSE SERPL-MCNC: 159 MG/DL (ref 70–108)
GLUCOSE SERPL-MCNC: 161 MG/DL (ref 70–108)
GLUCOSE SERPL-MCNC: 165 MG/DL (ref 70–108)
GLUCOSE SERPL-MCNC: 165 MG/DL (ref 70–108)
GLUCOSE SERPL-MCNC: 167 MG/DL (ref 70–108)
GLUCOSE SERPL-MCNC: 169 MG/DL (ref 70–108)
GLUCOSE SERPL-MCNC: 171 MG/DL (ref 70–108)
GLUCOSE SERPL-MCNC: 173 MG/DL (ref 70–108)
GLUCOSE SERPL-MCNC: 175 MG/DL (ref 70–108)
GLUCOSE SERPL-MCNC: 176 MG/DL (ref 70–108)
GLUCOSE SERPL-MCNC: 177 MG/DL (ref 70–108)
GLUCOSE SERPL-MCNC: 177 MG/DL (ref 70–108)
GLUCOSE SERPL-MCNC: 178 MG/DL (ref 70–108)
GLUCOSE SERPL-MCNC: 206 MG/DL (ref 70–108)
GLUCOSE SERPL-MCNC: 212 MG/DL (ref 70–108)
GLUCOSE SERPL-MCNC: 236 MG/DL (ref 70–108)
GLUCOSE UR QL STRIP.AUTO: 250 MG/DL
GLUCOSE UR QL STRIP.AUTO: 250 MG/DL
GLUCOSE UR QL STRIP.AUTO: >= 1000 MG/DL
GRAM STN SPEC: ABNORMAL
HADV DNA LOWER RESP QL NAA+NON-PROBE: NOT DETECTED
HADV DNA LOWER RESP QL NAA+NON-PROBE: NOT DETECTED
HAEMOPHILUS INFLUENZAE BY PCR: NOT DETECTED
HAEMOPHILUS INFLUENZAE BY PCR: NOT DETECTED
HBA1C MFR BLD HPLC: 6.1 % (ref 4.4–6.4)
HCO3 BLDA-SCNC: 26 MMOL/L (ref 23–28)
HCO3 BLDA-SCNC: 28 MMOL/L (ref 23–28)
HCO3 BLDA-SCNC: 28 MMOL/L (ref 23–28)
HCOV RNA LOWER RESP QL NAA+NON-PROBE: NOT DETECTED
HCOV RNA LOWER RESP QL NAA+NON-PROBE: NOT DETECTED
HCT VFR BLD AUTO: 15.7 % (ref 42–52)
HCT VFR BLD AUTO: 17.4 % (ref 42–52)
HCT VFR BLD AUTO: 19.6 % (ref 42–52)
HCT VFR BLD AUTO: 21.7 % (ref 42–52)
HCT VFR BLD AUTO: 21.9 % (ref 42–52)
HCT VFR BLD AUTO: 21.9 % (ref 42–52)
HCT VFR BLD AUTO: 22.5 % (ref 42–52)
HCT VFR BLD AUTO: 22.6 % (ref 42–52)
HCT VFR BLD AUTO: 22.8 % (ref 42–52)
HCT VFR BLD AUTO: 23.1 % (ref 42–52)
HCT VFR BLD AUTO: 23.5 % (ref 42–52)
HCT VFR BLD AUTO: 23.6 % (ref 42–52)
HCT VFR BLD AUTO: 23.7 % (ref 42–52)
HCT VFR BLD AUTO: 23.8 % (ref 42–52)
HCT VFR BLD AUTO: 23.8 % (ref 42–52)
HCT VFR BLD AUTO: 23.9 % (ref 42–52)
HCT VFR BLD AUTO: 24 % (ref 42–52)
HCT VFR BLD AUTO: 24.4 % (ref 42–52)
HCT VFR BLD AUTO: 24.5 % (ref 42–52)
HCT VFR BLD AUTO: 25.3 % (ref 42–52)
HCT VFR BLD AUTO: 25.7 % (ref 42–52)
HCT VFR BLD AUTO: 25.9 % (ref 42–52)
HCT VFR BLD AUTO: 26 % (ref 42–52)
HCT VFR BLD AUTO: 26.1 % (ref 42–52)
HCT VFR BLD AUTO: 26.1 % (ref 42–52)
HCT VFR BLD AUTO: 26.2 % (ref 42–52)
HCT VFR BLD AUTO: 26.3 % (ref 42–52)
HCT VFR BLD AUTO: 26.5 % (ref 42–52)
HCT VFR BLD AUTO: 26.5 % (ref 42–52)
HCT VFR BLD AUTO: 26.6 % (ref 42–52)
HCT VFR BLD AUTO: 26.6 % (ref 42–52)
HCT VFR BLD AUTO: 26.8 % (ref 42–52)
HCT VFR BLD AUTO: 27.1 % (ref 42–52)
HCT VFR BLD AUTO: 27.2 % (ref 42–52)
HCT VFR BLD AUTO: 27.4 % (ref 42–52)
HCT VFR BLD AUTO: 27.5 % (ref 42–52)
HCT VFR BLD AUTO: 27.6 % (ref 42–52)
HCT VFR BLD AUTO: 27.8 % (ref 42–52)
HCT VFR BLD AUTO: 28.6 % (ref 42–52)
HCT VFR BLD AUTO: 28.9 % (ref 42–52)
HCT VFR BLD AUTO: 29 % (ref 42–52)
HCT VFR BLD AUTO: 30 % (ref 42–52)
HCT VFR BLD AUTO: 38.6 % (ref 42–52)
HCV IGG SERPL QL IA: NEGATIVE
HEMOCCULT STL QL: NEGATIVE
HEPARIN UNFRACTIONATED: 0.38 U/ML (ref 0.3–0.7)
HEPARIN UNFRACTIONATED: 0.39 U/ML (ref 0.3–0.7)
HEPARIN UNFRACTIONATED: 0.4 U/ML (ref 0.3–0.7)
HEPARIN UNFRACTIONATED: 0.44 U/ML (ref 0.3–0.7)
HEPARIN UNFRACTIONATED: 0.44 U/ML (ref 0.3–0.7)
HEPARIN UNFRACTIONATED: 0.45 U/ML (ref 0.3–0.7)
HEPARIN UNFRACTIONATED: 0.45 U/ML (ref 0.3–0.7)
HEPARIN UNFRACTIONATED: 0.48 U/ML (ref 0.3–0.7)
HEPARIN UNFRACTIONATED: 0.51 U/ML (ref 0.3–0.7)
HEPARIN UNFRACTIONATED: 0.54 U/ML (ref 0.3–0.7)
HEPARIN UNFRACTIONATED: 0.54 U/ML (ref 0.3–0.7)
HEPARIN UNFRACTIONATED: 0.59 U/ML (ref 0.3–0.7)
HEPARIN UNFRACTIONATED: 0.7 U/ML (ref 0.3–0.7)
HEPARIN UNFRACTIONATED: < 0.04 U/ML (ref 0.3–0.7)
HEPARIN UNFRACTIONATED: < 0.04 U/ML (ref 0.3–0.7)
HGB BLD-MCNC: 11.8 GM/DL (ref 14–18)
HGB BLD-MCNC: 4.9 GM/DL (ref 14–18)
HGB BLD-MCNC: 5.4 GM/DL (ref 14–18)
HGB BLD-MCNC: 6.2 GM/DL (ref 14–18)
HGB BLD-MCNC: 6.7 GM/DL (ref 14–18)
HGB BLD-MCNC: 6.9 GM/DL (ref 14–18)
HGB BLD-MCNC: 7 GM/DL (ref 14–18)
HGB BLD-MCNC: 7.1 GM/DL (ref 14–18)
HGB BLD-MCNC: 7.4 GM/DL (ref 14–18)
HGB BLD-MCNC: 7.5 GM/DL (ref 14–18)
HGB BLD-MCNC: 7.6 GM/DL (ref 14–18)
HGB BLD-MCNC: 7.7 GM/DL (ref 14–18)
HGB BLD-MCNC: 7.9 GM/DL (ref 14–18)
HGB BLD-MCNC: 8 GM/DL (ref 14–18)
HGB BLD-MCNC: 8.1 GM/DL (ref 14–18)
HGB BLD-MCNC: 8.2 GM/DL (ref 14–18)
HGB BLD-MCNC: 8.3 GM/DL (ref 14–18)
HGB BLD-MCNC: 8.4 GM/DL (ref 14–18)
HGB BLD-MCNC: 8.4 GM/DL (ref 14–18)
HGB BLD-MCNC: 8.5 GM/DL (ref 14–18)
HGB BLD-MCNC: 8.6 GM/DL (ref 14–18)
HGB BLD-MCNC: 8.7 GM/DL (ref 14–18)
HGB BLD-MCNC: 8.9 GM/DL (ref 14–18)
HGB BLD-MCNC: 9 GM/DL (ref 14–18)
HGB BLD-MCNC: 9 GM/DL (ref 14–18)
HGB BLD-MCNC: 9.2 GM/DL (ref 14–18)
HGB BLD-MCNC: 9.2 GM/DL (ref 14–18)
HGB BLD-MCNC: 9.3 GM/DL (ref 14–18)
HGB BLD-MCNC: 9.3 GM/DL (ref 14–18)
HGB UR QL STRIP.AUTO: ABNORMAL
HMPV RNA LOWER RESP QL NAA+NON-PROBE: NOT DETECTED
HMPV RNA LOWER RESP QL NAA+NON-PROBE: NOT DETECTED
HPIV RNA LOWER RESP QL NAA+NON-PROBE: NOT DETECTED
HPIV RNA LOWER RESP QL NAA+NON-PROBE: NOT DETECTED
HYPOCHROMIA BLD QL SMEAR: PRESENT
IMM GRANULOCYTES # BLD AUTO: 0.05 THOU/MM3 (ref 0–0.07)
IMM GRANULOCYTES # BLD AUTO: 0.08 THOU/MM3 (ref 0–0.07)
IMM GRANULOCYTES # BLD AUTO: 0.09 THOU/MM3 (ref 0–0.07)
IMM GRANULOCYTES # BLD AUTO: 0.1 THOU/MM3 (ref 0–0.07)
IMM GRANULOCYTES # BLD AUTO: 0.11 THOU/MM3 (ref 0–0.07)
IMM GRANULOCYTES # BLD AUTO: 0.12 THOU/MM3 (ref 0–0.07)
IMM GRANULOCYTES # BLD AUTO: 0.12 THOU/MM3 (ref 0–0.07)
IMM GRANULOCYTES # BLD AUTO: 0.13 THOU/MM3 (ref 0–0.07)
IMM GRANULOCYTES NFR BLD AUTO: 0.5 %
IMM GRANULOCYTES NFR BLD AUTO: 0.7 %
IMM GRANULOCYTES NFR BLD AUTO: 0.8 %
IMM GRANULOCYTES NFR BLD AUTO: 0.9 %
IMM GRANULOCYTES NFR BLD AUTO: 0.9 %
INR PPP: 1.31 (ref 0.85–1.13)
INTERPRETATION SERPL IFE-IMP: NORMAL
KAPPA/LAMBDA FREE LIGHT CHAINS: NORMAL
KETONES UR QL STRIP.AUTO: NEGATIVE
KLEBSIELLA AEROGENES BY PCR: NOT DETECTED
KLEBSIELLA AEROGENES BY PCR: NOT DETECTED
KLEBSIELLA OXYTOCA BY PCR: NOT DETECTED
KLEBSIELLA OXYTOCA BY PCR: NOT DETECTED
KLEBSIELLA PNEUMONIAE GROUP BY PCR: NOT DETECTED
KLEBSIELLA PNEUMONIAE GROUP BY PCR: NOT DETECTED
L PNEUMO DNA LOWER RESP QL NAA+NON-PROBE: NOT DETECTED
L PNEUMO DNA LOWER RESP QL NAA+NON-PROBE: NOT DETECTED
L PNEUMO1 AG UR QL IA.RAPID: NEGATIVE
LACTATE BLD-SCNC: 1.2 MMOL/L (ref 0.5–1.9)
LACTATE SERPL-SCNC: 1.8 MMOL/L (ref 0.5–2)
LDH SERPL L TO P-CCNC: 289 U/L (ref 100–190)
LEUKOCYTE ESTERASE UR QL STRIP.AUTO: ABNORMAL
LEUKOCYTE ESTERASE UR QL STRIP.AUTO: NEGATIVE
LEUKOCYTE ESTERASE UR QL STRIP.AUTO: NEGATIVE
LYMPHOCYTES ABSOLUTE: 0.4 THOU/MM3 (ref 1–4.8)
LYMPHOCYTES ABSOLUTE: 1 THOU/MM3 (ref 1–4.8)
LYMPHOCYTES ABSOLUTE: 1.2 THOU/MM3 (ref 1–4.8)
LYMPHOCYTES ABSOLUTE: 1.3 THOU/MM3 (ref 1–4.8)
LYMPHOCYTES ABSOLUTE: 1.3 THOU/MM3 (ref 1–4.8)
LYMPHOCYTES ABSOLUTE: 1.4 THOU/MM3 (ref 1–4.8)
LYMPHOCYTES ABSOLUTE: 1.5 THOU/MM3 (ref 1–4.8)
LYMPHOCYTES ABSOLUTE: 1.6 THOU/MM3 (ref 1–4.8)
LYMPHOCYTES ABSOLUTE: 1.7 THOU/MM3 (ref 1–4.8)
LYMPHOCYTES ABSOLUTE: 3.3 THOU/MM3 (ref 1–4.8)
LYMPHOCYTES NFR BLD AUTO: 11.3 %
LYMPHOCYTES NFR BLD AUTO: 11.4 %
LYMPHOCYTES NFR BLD AUTO: 11.6 %
LYMPHOCYTES NFR BLD AUTO: 12 %
LYMPHOCYTES NFR BLD AUTO: 12.9 %
LYMPHOCYTES NFR BLD AUTO: 13.7 %
LYMPHOCYTES NFR BLD AUTO: 18.7 %
LYMPHOCYTES NFR BLD AUTO: 3.3 %
LYMPHOCYTES NFR BLD AUTO: 8 %
LYMPHOCYTES NFR BLD AUTO: 8.7 %
LYMPHOCYTES NFR BLD AUTO: 8.9 %
LYMPHOCYTES NFR BLD AUTO: 9.2 %
M PNEUMO DNA LOWER RESP QL NAA+NON-PROBE: NOT DETECTED
M PNEUMO DNA LOWER RESP QL NAA+NON-PROBE: NOT DETECTED
MAGNESIUM SERPL-MCNC: 2 MG/DL (ref 1.6–2.4)
MAGNESIUM SERPL-MCNC: 2 MG/DL (ref 1.6–2.4)
MAGNESIUM SERPL-MCNC: 2.1 MG/DL (ref 1.6–2.4)
MAGNESIUM SERPL-MCNC: 2.2 MG/DL (ref 1.6–2.4)
MAGNESIUM SERPL-MCNC: 2.5 MG/DL (ref 1.6–2.4)
MCH RBC QN AUTO: 27.8 PG (ref 26–33)
MCH RBC QN AUTO: 27.9 PG (ref 26–33)
MCH RBC QN AUTO: 28 PG (ref 26–33)
MCH RBC QN AUTO: 28 PG (ref 26–33)
MCH RBC QN AUTO: 28.1 PG (ref 26–33)
MCH RBC QN AUTO: 28.2 PG (ref 26–33)
MCH RBC QN AUTO: 28.3 PG (ref 26–33)
MCH RBC QN AUTO: 28.3 PG (ref 26–33)
MCH RBC QN AUTO: 28.4 PG (ref 26–33)
MCH RBC QN AUTO: 28.4 PG (ref 26–33)
MCH RBC QN AUTO: 28.5 PG (ref 26–33)
MCH RBC QN AUTO: 28.6 PG (ref 26–33)
MCH RBC QN AUTO: 28.7 PG (ref 26–33)
MCH RBC QN AUTO: 28.8 PG (ref 26–33)
MCH RBC QN AUTO: 29.4 PG (ref 26–33)
MCH RBC QN AUTO: 29.6 PG (ref 26–33)
MCH RBC QN AUTO: 29.6 PG (ref 26–33)
MCH RBC QN AUTO: 29.7 PG (ref 26–33)
MCH RBC QN AUTO: 29.8 PG (ref 26–33)
MCH RBC QN AUTO: 29.9 PG (ref 26–33)
MCH RBC QN AUTO: 30 PG (ref 26–33)
MCHC RBC AUTO-ENTMCNC: 29.7 GM/DL (ref 32.2–35.5)
MCHC RBC AUTO-ENTMCNC: 29.8 GM/DL (ref 32.2–35.5)
MCHC RBC AUTO-ENTMCNC: 29.9 GM/DL (ref 32.2–35.5)
MCHC RBC AUTO-ENTMCNC: 30 GM/DL (ref 32.2–35.5)
MCHC RBC AUTO-ENTMCNC: 30.6 GM/DL (ref 32.2–35.5)
MCHC RBC AUTO-ENTMCNC: 30.7 GM/DL (ref 32.2–35.5)
MCHC RBC AUTO-ENTMCNC: 30.7 GM/DL (ref 32.2–35.5)
MCHC RBC AUTO-ENTMCNC: 30.8 GM/DL (ref 32.2–35.5)
MCHC RBC AUTO-ENTMCNC: 30.8 GM/DL (ref 32.2–35.5)
MCHC RBC AUTO-ENTMCNC: 31 GM/DL (ref 32.2–35.5)
MCHC RBC AUTO-ENTMCNC: 31.1 GM/DL (ref 32.2–35.5)
MCHC RBC AUTO-ENTMCNC: 31.2 GM/DL (ref 32.2–35.5)
MCHC RBC AUTO-ENTMCNC: 31.3 GM/DL (ref 32.2–35.5)
MCHC RBC AUTO-ENTMCNC: 31.4 GM/DL (ref 32.2–35.5)
MCHC RBC AUTO-ENTMCNC: 31.6 GM/DL (ref 32.2–35.5)
MCHC RBC AUTO-ENTMCNC: 31.6 GM/DL (ref 32.2–35.5)
MCHC RBC AUTO-ENTMCNC: 31.7 GM/DL (ref 32.2–35.5)
MCHC RBC AUTO-ENTMCNC: 31.8 GM/DL (ref 32.2–35.5)
MCHC RBC AUTO-ENTMCNC: 31.8 GM/DL (ref 32.2–35.5)
MCHC RBC AUTO-ENTMCNC: 32.1 GM/DL (ref 32.2–35.5)
MCHC RBC AUTO-ENTMCNC: 32.3 GM/DL (ref 32.2–35.5)
MCHC RBC AUTO-ENTMCNC: 32.5 GM/DL (ref 32.2–35.5)
MCV RBC AUTO: 87.5 FL (ref 80–94)
MCV RBC AUTO: 88.7 FL (ref 80–94)
MCV RBC AUTO: 88.8 FL (ref 80–94)
MCV RBC AUTO: 89 FL (ref 80–94)
MCV RBC AUTO: 89.1 FL (ref 80–94)
MCV RBC AUTO: 89.9 FL (ref 80–94)
MCV RBC AUTO: 90.5 FL (ref 80–94)
MCV RBC AUTO: 90.7 FL (ref 80–94)
MCV RBC AUTO: 91.3 FL (ref 80–94)
MCV RBC AUTO: 91.3 FL (ref 80–94)
MCV RBC AUTO: 91.4 FL (ref 80–94)
MCV RBC AUTO: 91.5 FL (ref 80–94)
MCV RBC AUTO: 91.6 FL (ref 80–94)
MCV RBC AUTO: 91.7 FL (ref 80–94)
MCV RBC AUTO: 91.8 FL (ref 80–94)
MCV RBC AUTO: 92.1 FL (ref 80–94)
MCV RBC AUTO: 92.1 FL (ref 80–94)
MCV RBC AUTO: 92.2 FL (ref 80–94)
MCV RBC AUTO: 93.2 FL (ref 80–94)
MCV RBC AUTO: 94.1 FL (ref 80–94)
MCV RBC AUTO: 94.1 FL (ref 80–94)
MCV RBC AUTO: 94.3 FL (ref 80–94)
MCV RBC AUTO: 94.5 FL (ref 80–94)
MCV RBC AUTO: 94.6 FL (ref 80–94)
MCV RBC AUTO: 94.6 FL (ref 80–94)
MCV RBC AUTO: 95.5 FL (ref 80–94)
MICROALBUMIN UR-MCNC: 188.89 MG/DL
MICROALBUMIN/CREAT RATIO PNL UR: 3052 MG/G (ref 0–30)
MONOCYTES ABSOLUTE: 0.3 THOU/MM3 (ref 0.4–1.3)
MONOCYTES ABSOLUTE: 0.7 THOU/MM3 (ref 0.4–1.3)
MONOCYTES ABSOLUTE: 0.8 THOU/MM3 (ref 0.4–1.3)
MONOCYTES ABSOLUTE: 0.9 THOU/MM3 (ref 0.4–1.3)
MONOCYTES ABSOLUTE: 0.9 THOU/MM3 (ref 0.4–1.3)
MONOCYTES ABSOLUTE: 1 THOU/MM3 (ref 0.4–1.3)
MONOCYTES ABSOLUTE: 1 THOU/MM3 (ref 0.4–1.3)
MONOCYTES ABSOLUTE: 1.4 THOU/MM3 (ref 0.4–1.3)
MONOCYTES ABSOLUTE: 1.4 THOU/MM3 (ref 0.4–1.3)
MONOCYTES ABSOLUTE: 1.8 THOU/MM3 (ref 0.4–1.3)
MONOCYTES NFR BLD AUTO: 10.1 %
MONOCYTES NFR BLD AUTO: 11.3 %
MONOCYTES NFR BLD AUTO: 2.4 %
MONOCYTES NFR BLD AUTO: 5 %
MONOCYTES NFR BLD AUTO: 5.7 %
MONOCYTES NFR BLD AUTO: 5.9 %
MONOCYTES NFR BLD AUTO: 6.7 %
MONOCYTES NFR BLD AUTO: 7 %
MONOCYTES NFR BLD AUTO: 8.2 %
MONOCYTES NFR BLD AUTO: 8.6 %
MONOCYTES NFR BLD AUTO: 8.9 %
MONOCYTES NFR BLD AUTO: 9.9 %
MORAXELLA CATARRHALIS BY PCR: NOT DETECTED
MORAXELLA CATARRHALIS BY PCR: NOT DETECTED
MRSA DNA SPEC QL NAA+PROBE: NEGATIVE
MUCOUS THREADS URNS QL MICRO: ABNORMAL
MYELOPEROXIDASE AB SER-ACNC: 0 AU/ML (ref 0–19)
NEUTROPHILS NFR BLD AUTO: 70.2 %
NEUTROPHILS NFR BLD AUTO: 72.1 %
NEUTROPHILS NFR BLD AUTO: 73.4 %
NEUTROPHILS NFR BLD AUTO: 75.6 %
NEUTROPHILS NFR BLD AUTO: 75.8 %
NEUTROPHILS NFR BLD AUTO: 76.8 %
NEUTROPHILS NFR BLD AUTO: 77.1 %
NEUTROPHILS NFR BLD AUTO: 78.2 %
NEUTROPHILS NFR BLD AUTO: 80 %
NEUTROPHILS NFR BLD AUTO: 81.7 %
NEUTROPHILS NFR BLD AUTO: 82.6 %
NEUTROPHILS NFR BLD AUTO: 93.3 %
NITRITE UR QL STRIP.AUTO: NEGATIVE
NRBC BLD AUTO-RTO: 0 /100 WBC
NT-PROBNP SERPL IA-MCNC: ABNORMAL PG/ML (ref 0–449)
NUCLEAR IGG SER QL IA: NORMAL
NUCLEAR IGG SER QL IA: NORMAL
ORGANISM: ABNORMAL
PATHOLOGIST REVIEW: ABNORMAL
PCO2 BLDA: 39 MMHG (ref 35–45)
PCO2 BLDA: 42 MMHG (ref 35–45)
PCO2 BLDA: 55 MMHG (ref 35–45)
PH BLDA: 7.32 [PH] (ref 7.35–7.45)
PH BLDA: 7.43 [PH] (ref 7.35–7.45)
PH BLDA: 7.44 [PH] (ref 7.35–7.45)
PH UR STRIP.AUTO: 6 [PH] (ref 5–9)
PH UR STRIP.AUTO: 6.5 [PH] (ref 5–9)
PH UR STRIP.AUTO: 7 [PH] (ref 5–9)
PHOSPHATE SERPL-MCNC: 2.4 MG/DL (ref 2.4–4.7)
PHOSPHATE SERPL-MCNC: 2.9 MG/DL (ref 2.4–4.7)
PHOSPHATE SERPL-MCNC: 2.9 MG/DL (ref 2.4–4.7)
PHOSPHATE SERPL-MCNC: 3.2 MG/DL (ref 2.4–4.7)
PIP: 20 CMH2O
PLATELET # BLD AUTO: 207 THOU/MM3 (ref 130–400)
PLATELET # BLD AUTO: 219 THOU/MM3 (ref 130–400)
PLATELET # BLD AUTO: 227 THOU/MM3 (ref 130–400)
PLATELET # BLD AUTO: 230 THOU/MM3 (ref 130–400)
PLATELET # BLD AUTO: 262 THOU/MM3 (ref 130–400)
PLATELET # BLD AUTO: 265 THOU/MM3 (ref 130–400)
PLATELET # BLD AUTO: 267 THOU/MM3 (ref 130–400)
PLATELET # BLD AUTO: 268 THOU/MM3 (ref 130–400)
PLATELET # BLD AUTO: 275 THOU/MM3 (ref 130–400)
PLATELET # BLD AUTO: 276 THOU/MM3 (ref 130–400)
PLATELET # BLD AUTO: 285 THOU/MM3 (ref 130–400)
PLATELET # BLD AUTO: 290 THOU/MM3 (ref 130–400)
PLATELET # BLD AUTO: 309 THOU/MM3 (ref 130–400)
PLATELET # BLD AUTO: 309 THOU/MM3 (ref 130–400)
PLATELET # BLD AUTO: 312 THOU/MM3 (ref 130–400)
PLATELET # BLD AUTO: 314 THOU/MM3 (ref 130–400)
PLATELET # BLD AUTO: 316 THOU/MM3 (ref 130–400)
PLATELET # BLD AUTO: 317 THOU/MM3 (ref 130–400)
PLATELET # BLD AUTO: 319 THOU/MM3 (ref 130–400)
PLATELET # BLD AUTO: 321 THOU/MM3 (ref 130–400)
PLATELET # BLD AUTO: 344 THOU/MM3 (ref 130–400)
PLATELET # BLD AUTO: 348 THOU/MM3 (ref 130–400)
PLATELET # BLD AUTO: 352 THOU/MM3 (ref 130–400)
PLATELET # BLD AUTO: 356 THOU/MM3 (ref 130–400)
PLATELET BLD QL SMEAR: ADEQUATE
PMV BLD AUTO: 10 FL (ref 9.4–12.4)
PMV BLD AUTO: 10 FL (ref 9.4–12.4)
PMV BLD AUTO: 10.2 FL (ref 9.4–12.4)
PMV BLD AUTO: 10.2 FL (ref 9.4–12.4)
PMV BLD AUTO: 10.3 FL (ref 9.4–12.4)
PMV BLD AUTO: 10.4 FL (ref 9.4–12.4)
PMV BLD AUTO: 10.6 FL (ref 9.4–12.4)
PMV BLD AUTO: 10.9 FL (ref 9.4–12.4)
PMV BLD AUTO: 9.2 FL (ref 9.4–12.4)
PMV BLD AUTO: 9.3 FL (ref 9.4–12.4)
PMV BLD AUTO: 9.3 FL (ref 9.4–12.4)
PMV BLD AUTO: 9.4 FL (ref 9.4–12.4)
PMV BLD AUTO: 9.5 FL (ref 9.4–12.4)
PMV BLD AUTO: 9.7 FL (ref 9.4–12.4)
PMV BLD AUTO: 9.7 FL (ref 9.4–12.4)
PMV BLD AUTO: 9.8 FL (ref 9.4–12.4)
PMV BLD AUTO: 9.9 FL (ref 9.4–12.4)
PO2 BLDA: 134 MMHG (ref 71–104)
PO2 BLDA: 61 MMHG (ref 71–104)
PO2 BLDA: 67 MMHG (ref 71–104)
POTASSIUM SERPL-SCNC: 3.3 MEQ/L (ref 3.5–5.2)
POTASSIUM SERPL-SCNC: 3.4 MEQ/L (ref 3.5–5.2)
POTASSIUM SERPL-SCNC: 3.4 MEQ/L (ref 3.5–5.2)
POTASSIUM SERPL-SCNC: 3.5 MEQ/L (ref 3.5–5.2)
POTASSIUM SERPL-SCNC: 3.6 MEQ/L (ref 3.5–5.2)
POTASSIUM SERPL-SCNC: 3.6 MEQ/L (ref 3.5–5.2)
POTASSIUM SERPL-SCNC: 3.7 MEQ/L (ref 3.5–5.2)
POTASSIUM SERPL-SCNC: 3.7 MEQ/L (ref 3.5–5.2)
POTASSIUM SERPL-SCNC: 3.8 MEQ/L (ref 3.5–5.2)
POTASSIUM SERPL-SCNC: 3.9 MEQ/L (ref 3.5–5.2)
POTASSIUM SERPL-SCNC: 4 MEQ/L (ref 3.5–5.2)
POTASSIUM SERPL-SCNC: 4 MEQ/L (ref 3.5–5.2)
POTASSIUM SERPL-SCNC: 4.1 MEQ/L (ref 3.5–5.2)
POTASSIUM SERPL-SCNC: 4.1 MEQ/L (ref 3.5–5.2)
POTASSIUM SERPL-SCNC: 4.2 MEQ/L (ref 3.5–5.2)
POTASSIUM SERPL-SCNC: 4.3 MEQ/L (ref 3.5–5.2)
POTASSIUM SERPL-SCNC: 4.4 MEQ/L (ref 3.5–5.2)
POTASSIUM SERPL-SCNC: 4.6 MEQ/L (ref 3.5–5.2)
POTASSIUM SERPL-SCNC: 4.8 MEQ/L (ref 3.5–5.2)
POTASSIUM SERPL-SCNC: 5.9 MEQ/L (ref 3.5–5.2)
POTASSIUM UR-SCNC: 36.4 MEQ/L
PROCALCITONIN SERPL IA-MCNC: 0.25 NG/ML (ref 0.01–0.09)
PROT SERPL-MCNC: 5.7 G/DL (ref 6.1–8)
PROT SERPL-MCNC: 6.1 G/DL (ref 6.1–8)
PROT SERPL-MCNC: 6.5 G/DL (ref 6.1–8)
PROT UR STRIP.AUTO-MCNC: 100 MG/DL
PROT UR STRIP.AUTO-MCNC: 300 MG/DL
PROT UR STRIP.AUTO-MCNC: 300 MG/DL
PROT UR-MCNC: 397.2 MG/DL
PROT/CREAT 24H UR: 6.42 MG/G{CREAT}
PROTEIN ELECTROPHORESIS, SERUM: NORMAL
PROTEINASE3 AB SER-ACNC: 0 AU/ML (ref 0–19)
PROTEUS SPECIES BY PCR: NOT DETECTED
PROTEUS SPECIES BY PCR: NOT DETECTED
PSEUDOMONAS AERUGINOSA BY PCR: NOT DETECTED
PSEUDOMONAS AERUGINOSA BY PCR: NOT DETECTED
RBC # BLD AUTO: 1.72 MILL/MM3 (ref 4.7–6.1)
RBC # BLD AUTO: 1.89 MILL/MM3 (ref 4.7–6.1)
RBC # BLD AUTO: 2.39 MILL/MM3 (ref 4.7–6.1)
RBC # BLD AUTO: 2.45 MILL/MM3 (ref 4.7–6.1)
RBC # BLD AUTO: 2.47 MILL/MM3 (ref 4.7–6.1)
RBC # BLD AUTO: 2.55 MILL/MM3 (ref 4.7–6.1)
RBC # BLD AUTO: 2.57 MILL/MM3 (ref 4.7–6.1)
RBC # BLD AUTO: 2.65 MILL/MM3 (ref 4.7–6.1)
RBC # BLD AUTO: 2.74 MILL/MM3 (ref 4.7–6.1)
RBC # BLD AUTO: 2.8 MILL/MM3 (ref 4.7–6.1)
RBC # BLD AUTO: 2.84 MILL/MM3 (ref 4.7–6.1)
RBC # BLD AUTO: 2.84 MILL/MM3 (ref 4.7–6.1)
RBC # BLD AUTO: 2.87 MILL/MM3 (ref 4.7–6.1)
RBC # BLD AUTO: 2.9 MILL/MM3 (ref 4.7–6.1)
RBC # BLD AUTO: 2.92 MILL/MM3 (ref 4.7–6.1)
RBC # BLD AUTO: 2.92 MILL/MM3 (ref 4.7–6.1)
RBC # BLD AUTO: 2.93 MILL/MM3 (ref 4.7–6.1)
RBC # BLD AUTO: 2.94 MILL/MM3 (ref 4.7–6.1)
RBC # BLD AUTO: 2.96 MILL/MM3 (ref 4.7–6.1)
RBC # BLD AUTO: 3 MILL/MM3 (ref 4.7–6.1)
RBC # BLD AUTO: 3.1 MILL/MM3 (ref 4.7–6.1)
RBC # BLD AUTO: 3.13 MILL/MM3 (ref 4.7–6.1)
RBC # BLD AUTO: 3.17 MILL/MM3 (ref 4.7–6.1)
RBC # BLD AUTO: 3.22 MILL/MM3 (ref 4.7–6.1)
RBC # BLD AUTO: 3.27 MILL/MM3 (ref 4.7–6.1)
RBC # BLD AUTO: 4.23 MILL/MM3 (ref 4.7–6.1)
RBC #/AREA URNS HPF: ABNORMAL /HPF
REASON FOR REJECTION: NORMAL
REJECTED TEST: NORMAL
RENAL EPI CELLS #/AREA URNS HPF: ABNORMAL /[HPF]
RENAL EPI CELLS #/AREA URNS HPF: ABNORMAL /[HPF]
RESISTANT GENE MECA/C & MREJ BY PCR: ABNORMAL
RESISTANT GENE MECA/C & MREJ BY PCR: ABNORMAL
RESISTANT GENE NDM BY PCR: NOT DETECTED
RESISTANT GENE NDM BY PCR: NOT DETECTED
RH FACTOR: NORMAL
RH FACTOR: NORMAL
RHEUMATOID FACT SERPL-ACNC: < 10 IU/ML (ref 0–13)
RSV RNA LOWER RESP QL NAA+NON-PROBE: NOT DETECTED
RSV RNA LOWER RESP QL NAA+NON-PROBE: NOT DETECTED
RV+EV RNA LOWER RESP QL NAA+NON-PROBE: NOT DETECTED
RV+EV RNA LOWER RESP QL NAA+NON-PROBE: NOT DETECTED
SAO2 % BLDA: 92 %
SAO2 % BLDA: 93 %
SAO2 % BLDA: 99 %
SARS-COV-2 RNA RESP QL NAA+PROBE: NOT DETECTED
SCAN OF BLOOD SMEAR: NORMAL
SCHISTOCYTES BLD QL SMEAR: ABNORMAL
SCHISTOCYTES BLD QL SMEAR: ABNORMAL
SEGMENTED NEUTROPHILS ABSOLUTE COUNT: 10.2 THOU/MM3 (ref 1.8–7.7)
SEGMENTED NEUTROPHILS ABSOLUTE COUNT: 10.6 THOU/MM3 (ref 1.8–7.7)
SEGMENTED NEUTROPHILS ABSOLUTE COUNT: 11.2 THOU/MM3 (ref 1.8–7.7)
SEGMENTED NEUTROPHILS ABSOLUTE COUNT: 11.5 THOU/MM3 (ref 1.8–7.7)
SEGMENTED NEUTROPHILS ABSOLUTE COUNT: 12.5 THOU/MM3 (ref 1.8–7.7)
SEGMENTED NEUTROPHILS ABSOLUTE COUNT: 12.6 THOU/MM3 (ref 1.8–7.7)
SEGMENTED NEUTROPHILS ABSOLUTE COUNT: 8.3 THOU/MM3 (ref 1.8–7.7)
SEGMENTED NEUTROPHILS ABSOLUTE COUNT: 8.4 THOU/MM3 (ref 1.8–7.7)
SEGMENTED NEUTROPHILS ABSOLUTE COUNT: 8.7 THOU/MM3 (ref 1.8–7.7)
SEGMENTED NEUTROPHILS ABSOLUTE COUNT: 8.8 THOU/MM3 (ref 1.8–7.7)
SEGMENTED NEUTROPHILS ABSOLUTE COUNT: 8.9 THOU/MM3 (ref 1.8–7.7)
SEGMENTED NEUTROPHILS ABSOLUTE COUNT: 9.1 THOU/MM3 (ref 1.8–7.7)
SERRATIA MARCESCENS BY PCR: NOT DETECTED
SERRATIA MARCESCENS BY PCR: NOT DETECTED
SODIUM SERPL-SCNC: 132 MEQ/L (ref 135–145)
SODIUM SERPL-SCNC: 133 MEQ/L (ref 135–145)
SODIUM SERPL-SCNC: 134 MEQ/L (ref 135–145)
SODIUM SERPL-SCNC: 136 MEQ/L (ref 135–145)
SODIUM SERPL-SCNC: 136 MEQ/L (ref 135–145)
SODIUM SERPL-SCNC: 137 MEQ/L (ref 135–145)
SODIUM SERPL-SCNC: 137 MEQ/L (ref 135–145)
SODIUM SERPL-SCNC: 139 MEQ/L (ref 135–145)
SODIUM SERPL-SCNC: 140 MEQ/L (ref 135–145)
SODIUM SERPL-SCNC: 141 MEQ/L (ref 135–145)
SODIUM SERPL-SCNC: 142 MEQ/L (ref 135–145)
SODIUM SERPL-SCNC: 143 MEQ/L (ref 135–145)
SODIUM SERPL-SCNC: 143 MEQ/L (ref 135–145)
SODIUM SERPL-SCNC: 144 MEQ/L (ref 135–145)
SODIUM SERPL-SCNC: 145 MEQ/L (ref 135–145)
SODIUM SERPL-SCNC: 146 MEQ/L (ref 135–145)
SODIUM SERPL-SCNC: 148 MEQ/L (ref 135–145)
SODIUM UR-SCNC: 46 MEQ/L
SOURCE: ABNORMAL
SOURCE: ABNORMAL
SPECIFIC GRAVITY UA: 1.02 (ref 1–1.03)
SPECIMEN ACCEPTABILITY: ABNORMAL
SPECIMEN ACCEPTABILITY: ABNORMAL
STAPH AUREUS BY PCR: NOT DETECTED
STAPH AUREUS BY PCR: NOT DETECTED
STREP AGALACTIAE BY PCR: NOT DETECTED
STREP AGALACTIAE BY PCR: NOT DETECTED
STREP PNEUMONIAE BY PCR: NOT DETECTED
STREP PNEUMONIAE BY PCR: NOT DETECTED
STREP PYOGENES BY PCR: NOT DETECTED
STREP PYOGENES BY PCR: NOT DETECTED
TRANSFERRIN SERPL-MCNC: 34 U/L (ref 8–52)
TRIGL SERPL-MCNC: 151 MG/DL (ref 0–199)
TRIGL SERPL-MCNC: 85 MG/DL (ref 0–199)
TROPONIN, HIGH SENSITIVITY: 61 NG/L (ref 0–12)
TROPONIN, HIGH SENSITIVITY: 76 NG/L (ref 0–12)
UROBILINOGEN, URINE: 0.2 EU/DL (ref 0–1)
UUN 24H UR-MCNC: 611 MG/DL
VANCOMYCIN SERPL-MCNC: 11.6 UG/ML (ref 0.1–39.9)
VANCOMYCIN SERPL-MCNC: 13.8 UG/ML (ref 0.1–39.9)
WBC # BLD AUTO: 10.8 THOU/MM3 (ref 4.8–10.8)
WBC # BLD AUTO: 11 THOU/MM3 (ref 4.8–10.8)
WBC # BLD AUTO: 11.1 THOU/MM3 (ref 4.8–10.8)
WBC # BLD AUTO: 11.5 THOU/MM3 (ref 4.8–10.8)
WBC # BLD AUTO: 11.5 THOU/MM3 (ref 4.8–10.8)
WBC # BLD AUTO: 11.6 THOU/MM3 (ref 4.8–10.8)
WBC # BLD AUTO: 11.9 THOU/MM3 (ref 4.8–10.8)
WBC # BLD AUTO: 12 THOU/MM3 (ref 4.8–10.8)
WBC # BLD AUTO: 12.2 THOU/MM3 (ref 4.8–10.8)
WBC # BLD AUTO: 12.2 THOU/MM3 (ref 4.8–10.8)
WBC # BLD AUTO: 12.9 THOU/MM3 (ref 4.8–10.8)
WBC # BLD AUTO: 13.2 THOU/MM3 (ref 4.8–10.8)
WBC # BLD AUTO: 13.5 THOU/MM3 (ref 4.8–10.8)
WBC # BLD AUTO: 13.7 THOU/MM3 (ref 4.8–10.8)
WBC # BLD AUTO: 13.9 THOU/MM3 (ref 4.8–10.8)
WBC # BLD AUTO: 14.1 THOU/MM3 (ref 4.8–10.8)
WBC # BLD AUTO: 14.4 THOU/MM3 (ref 4.8–10.8)
WBC # BLD AUTO: 14.7 THOU/MM3 (ref 4.8–10.8)
WBC # BLD AUTO: 15.1 THOU/MM3 (ref 4.8–10.8)
WBC # BLD AUTO: 15.3 THOU/MM3 (ref 4.8–10.8)
WBC # BLD AUTO: 17.9 THOU/MM3 (ref 4.8–10.8)
WBC # BLD AUTO: 17.9 THOU/MM3 (ref 4.8–10.8)
WBC # BLD AUTO: 8.5 THOU/MM3 (ref 4.8–10.8)
WBC # BLD AUTO: 8.7 THOU/MM3 (ref 4.8–10.8)
WBC # BLD AUTO: 8.8 THOU/MM3 (ref 4.8–10.8)
WBC # BLD AUTO: 9 THOU/MM3 (ref 4.8–10.8)
WBC #/AREA URNS HPF: ABNORMAL /HPF
YEAST LIKE FUNGI URNS QL MICRO: ABNORMAL
YEAST LIKE FUNGI URNS QL MICRO: ABNORMAL

## 2023-01-01 PROCEDURE — 94761 N-INVAS EAR/PLS OXIMETRY MLT: CPT

## 2023-01-01 PROCEDURE — 94640 AIRWAY INHALATION TREATMENT: CPT

## 2023-01-01 PROCEDURE — 2700000000 HC OXYGEN THERAPY PER DAY

## 2023-01-01 PROCEDURE — 99232 SBSQ HOSP IP/OBS MODERATE 35: CPT | Performed by: INTERNAL MEDICINE

## 2023-01-01 PROCEDURE — 2580000003 HC RX 258

## 2023-01-01 PROCEDURE — 6360000002 HC RX W HCPCS: Performed by: STUDENT IN AN ORGANIZED HEALTH CARE EDUCATION/TRAINING PROGRAM

## 2023-01-01 PROCEDURE — 6370000000 HC RX 637 (ALT 250 FOR IP)

## 2023-01-01 PROCEDURE — 6360000002 HC RX W HCPCS

## 2023-01-01 PROCEDURE — 94003 VENT MGMT INPAT SUBQ DAY: CPT

## 2023-01-01 PROCEDURE — 97530 THERAPEUTIC ACTIVITIES: CPT

## 2023-01-01 PROCEDURE — 71045 X-RAY EXAM CHEST 1 VIEW: CPT

## 2023-01-01 PROCEDURE — 93005 ELECTROCARDIOGRAM TRACING: CPT

## 2023-01-01 PROCEDURE — 86334 IMMUNOFIX E-PHORESIS SERUM: CPT

## 2023-01-01 PROCEDURE — 82330 ASSAY OF CALCIUM: CPT

## 2023-01-01 PROCEDURE — 6370000000 HC RX 637 (ALT 250 FOR IP): Performed by: STUDENT IN AN ORGANIZED HEALTH CARE EDUCATION/TRAINING PROGRAM

## 2023-01-01 PROCEDURE — 74176 CT ABD & PELVIS W/O CONTRAST: CPT

## 2023-01-01 PROCEDURE — 6370000000 HC RX 637 (ALT 250 FOR IP): Performed by: INTERNAL MEDICINE

## 2023-01-01 PROCEDURE — 85520 HEPARIN ASSAY: CPT

## 2023-01-01 PROCEDURE — 85730 THROMBOPLASTIN TIME PARTIAL: CPT

## 2023-01-01 PROCEDURE — 85014 HEMATOCRIT: CPT

## 2023-01-01 PROCEDURE — 87186 SC STD MICRODIL/AGAR DIL: CPT

## 2023-01-01 PROCEDURE — 97163 PT EVAL HIGH COMPLEX 45 MIN: CPT

## 2023-01-01 PROCEDURE — 92526 ORAL FUNCTION THERAPY: CPT

## 2023-01-01 PROCEDURE — 85018 HEMOGLOBIN: CPT

## 2023-01-01 PROCEDURE — 82948 REAGENT STRIP/BLOOD GLUCOSE: CPT

## 2023-01-01 PROCEDURE — 2000000000 HC ICU R&B

## 2023-01-01 PROCEDURE — 6370000000 HC RX 637 (ALT 250 FOR IP): Performed by: NURSE PRACTITIONER

## 2023-01-01 PROCEDURE — 93010 ELECTROCARDIOGRAM REPORT: CPT | Performed by: INTERNAL MEDICINE

## 2023-01-01 PROCEDURE — 83735 ASSAY OF MAGNESIUM: CPT

## 2023-01-01 PROCEDURE — 2500000003 HC RX 250 WO HCPCS

## 2023-01-01 PROCEDURE — P9016 RBC LEUKOCYTES REDUCED: HCPCS

## 2023-01-01 PROCEDURE — 85025 COMPLETE CBC W/AUTO DIFF WBC: CPT

## 2023-01-01 PROCEDURE — 81001 URINALYSIS AUTO W/SCOPE: CPT

## 2023-01-01 PROCEDURE — 6360000002 HC RX W HCPCS: Performed by: NURSE PRACTITIONER

## 2023-01-01 PROCEDURE — 2500000003 HC RX 250 WO HCPCS: Performed by: INTERNAL MEDICINE

## 2023-01-01 PROCEDURE — 87541 LEGION PNEUMO DNA AMP PROB: CPT

## 2023-01-01 PROCEDURE — 84132 ASSAY OF SERUM POTASSIUM: CPT

## 2023-01-01 PROCEDURE — 84478 ASSAY OF TRIGLYCERIDES: CPT

## 2023-01-01 PROCEDURE — 84155 ASSAY OF PROTEIN SERUM: CPT

## 2023-01-01 PROCEDURE — 36600 WITHDRAWAL OF ARTERIAL BLOOD: CPT

## 2023-01-01 PROCEDURE — 99233 SBSQ HOSP IP/OBS HIGH 50: CPT | Performed by: INTERNAL MEDICINE

## 2023-01-01 PROCEDURE — 76376 3D RENDER W/INTRP POSTPROCES: CPT

## 2023-01-01 PROCEDURE — 80048 BASIC METABOLIC PNL TOTAL CA: CPT

## 2023-01-01 PROCEDURE — 86850 RBC ANTIBODY SCREEN: CPT

## 2023-01-01 PROCEDURE — 99223 1ST HOSP IP/OBS HIGH 75: CPT | Performed by: INTERNAL MEDICINE

## 2023-01-01 PROCEDURE — 99223 1ST HOSP IP/OBS HIGH 75: CPT | Performed by: STUDENT IN AN ORGANIZED HEALTH CARE EDUCATION/TRAINING PROGRAM

## 2023-01-01 PROCEDURE — 36415 COLL VENOUS BLD VENIPUNCTURE: CPT

## 2023-01-01 PROCEDURE — 2500000003 HC RX 250 WO HCPCS: Performed by: STUDENT IN AN ORGANIZED HEALTH CARE EDUCATION/TRAINING PROGRAM

## 2023-01-01 PROCEDURE — 82164 ANGIOTENSIN I ENZYME TEST: CPT

## 2023-01-01 PROCEDURE — 82010 KETONE BODYS QUAN: CPT

## 2023-01-01 PROCEDURE — 82550 ASSAY OF CK (CPK): CPT

## 2023-01-01 PROCEDURE — 82803 BLOOD GASES ANY COMBINATION: CPT

## 2023-01-01 PROCEDURE — 2140000000 HC CCU INTERMEDIATE R&B

## 2023-01-01 PROCEDURE — 97535 SELF CARE MNGMENT TRAINING: CPT

## 2023-01-01 PROCEDURE — 86038 ANTINUCLEAR ANTIBODIES: CPT

## 2023-01-01 PROCEDURE — 83883 ASSAY NEPHELOMETRY NOT SPEC: CPT

## 2023-01-01 PROCEDURE — 2060000000 HC ICU INTERMEDIATE R&B

## 2023-01-01 PROCEDURE — 97110 THERAPEUTIC EXERCISES: CPT

## 2023-01-01 PROCEDURE — 71250 CT THORAX DX C-: CPT

## 2023-01-01 PROCEDURE — 87106 FUNGI IDENTIFICATION YEAST: CPT

## 2023-01-01 PROCEDURE — 73700 CT LOWER EXTREMITY W/O DYE: CPT

## 2023-01-01 PROCEDURE — 2580000003 HC RX 258: Performed by: STUDENT IN AN ORGANIZED HEALTH CARE EDUCATION/TRAINING PROGRAM

## 2023-01-01 PROCEDURE — 83516 IMMUNOASSAY NONANTIBODY: CPT

## 2023-01-01 PROCEDURE — 89220 SPUTUM SPECIMEN COLLECTION: CPT

## 2023-01-01 PROCEDURE — 83036 HEMOGLOBIN GLYCOSYLATED A1C: CPT

## 2023-01-01 PROCEDURE — 87070 CULTURE OTHR SPECIMN AEROBIC: CPT

## 2023-01-01 PROCEDURE — 83615 LACTATE (LD) (LDH) ENZYME: CPT

## 2023-01-01 PROCEDURE — 92610 EVALUATE SWALLOWING FUNCTION: CPT

## 2023-01-01 PROCEDURE — 84156 ASSAY OF PROTEIN URINE: CPT

## 2023-01-01 PROCEDURE — 85027 COMPLETE CBC AUTOMATED: CPT

## 2023-01-01 PROCEDURE — 99291 CRITICAL CARE FIRST HOUR: CPT | Performed by: INTERNAL MEDICINE

## 2023-01-01 PROCEDURE — 89190 NASAL SMEAR FOR EOSINOPHILS: CPT

## 2023-01-01 PROCEDURE — 94669 MECHANICAL CHEST WALL OSCILL: CPT

## 2023-01-01 PROCEDURE — 86901 BLOOD TYPING SEROLOGIC RH(D): CPT

## 2023-01-01 PROCEDURE — 31500 INSERT EMERGENCY AIRWAY: CPT

## 2023-01-01 PROCEDURE — 87449 NOS EACH ORGANISM AG IA: CPT

## 2023-01-01 PROCEDURE — 30233N1 TRANSFUSION OF NONAUTOLOGOUS RED BLOOD CELLS INTO PERIPHERAL VEIN, PERCUTANEOUS APPROACH: ICD-10-PCS | Performed by: STUDENT IN AN ORGANIZED HEALTH CARE EDUCATION/TRAINING PROGRAM

## 2023-01-01 PROCEDURE — 85610 PROTHROMBIN TIME: CPT

## 2023-01-01 PROCEDURE — 87631 RESP VIRUS 3-5 TARGETS: CPT

## 2023-01-01 PROCEDURE — 86430 RHEUMATOID FACTOR TEST QUAL: CPT

## 2023-01-01 PROCEDURE — 6360000002 HC RX W HCPCS: Performed by: INTERNAL MEDICINE

## 2023-01-01 PROCEDURE — 86160 COMPLEMENT ANTIGEN: CPT

## 2023-01-01 PROCEDURE — 87205 SMEAR GRAM STAIN: CPT

## 2023-01-01 PROCEDURE — 87581 M.PNEUMON DNA AMP PROBE: CPT

## 2023-01-01 PROCEDURE — 82570 ASSAY OF URINE CREATININE: CPT

## 2023-01-01 PROCEDURE — 84165 PROTEIN E-PHORESIS SERUM: CPT

## 2023-01-01 PROCEDURE — 2580000003 HC RX 258: Performed by: INTERNAL MEDICINE

## 2023-01-01 PROCEDURE — A9558 XE133 XENON 10MCI: HCPCS | Performed by: STUDENT IN AN ORGANIZED HEALTH CARE EDUCATION/TRAINING PROGRAM

## 2023-01-01 PROCEDURE — 99233 SBSQ HOSP IP/OBS HIGH 50: CPT | Performed by: STUDENT IN AN ORGANIZED HEALTH CARE EDUCATION/TRAINING PROGRAM

## 2023-01-01 PROCEDURE — 83605 ASSAY OF LACTIC ACID: CPT

## 2023-01-01 PROCEDURE — 84300 ASSAY OF URINE SODIUM: CPT

## 2023-01-01 PROCEDURE — 84540 ASSAY OF URINE/UREA-N: CPT

## 2023-01-01 PROCEDURE — 70450 CT HEAD/BRAIN W/O DYE: CPT

## 2023-01-01 PROCEDURE — 82085 ASSAY OF ALDOLASE: CPT

## 2023-01-01 PROCEDURE — 82043 UR ALBUMIN QUANTITATIVE: CPT

## 2023-01-01 PROCEDURE — 82436 ASSAY OF URINE CHLORIDE: CPT

## 2023-01-01 PROCEDURE — 76770 US EXAM ABDO BACK WALL COMP: CPT

## 2023-01-01 PROCEDURE — 80202 ASSAY OF VANCOMYCIN: CPT

## 2023-01-01 PROCEDURE — 36430 TRANSFUSION BLD/BLD COMPNT: CPT

## 2023-01-01 PROCEDURE — 93970 EXTREMITY STUDY: CPT

## 2023-01-01 PROCEDURE — 86900 BLOOD TYPING SEROLOGIC ABO: CPT

## 2023-01-01 PROCEDURE — 97167 OT EVAL HIGH COMPLEX 60 MIN: CPT

## 2023-01-01 PROCEDURE — 92611 MOTION FLUOROSCOPY/SWALLOW: CPT

## 2023-01-01 PROCEDURE — 86803 HEPATITIS C AB TEST: CPT

## 2023-01-01 PROCEDURE — 86235 NUCLEAR ANTIGEN ANTIBODY: CPT

## 2023-01-01 PROCEDURE — 84100 ASSAY OF PHOSPHORUS: CPT

## 2023-01-01 PROCEDURE — 86923 COMPATIBILITY TEST ELECTRIC: CPT

## 2023-01-01 PROCEDURE — 87040 BLOOD CULTURE FOR BACTERIA: CPT

## 2023-01-01 PROCEDURE — 74230 X-RAY XM SWLNG FUNCJ C+: CPT

## 2023-01-01 PROCEDURE — 74018 RADEX ABDOMEN 1 VIEW: CPT

## 2023-01-01 PROCEDURE — 87077 CULTURE AEROBIC IDENTIFY: CPT

## 2023-01-01 PROCEDURE — 84133 ASSAY OF URINE POTASSIUM: CPT

## 2023-01-01 PROCEDURE — 31500 INSERT EMERGENCY AIRWAY: CPT | Performed by: INTERNAL MEDICINE

## 2023-01-01 PROCEDURE — 87486 CHLMYD PNEUM DNA AMP PROBE: CPT

## 2023-01-01 PROCEDURE — 84145 PROCALCITONIN (PCT): CPT

## 2023-01-01 PROCEDURE — 82040 ASSAY OF SERUM ALBUMIN: CPT

## 2023-01-01 PROCEDURE — 93005 ELECTROCARDIOGRAM TRACING: CPT | Performed by: STUDENT IN AN ORGANIZED HEALTH CARE EDUCATION/TRAINING PROGRAM

## 2023-01-01 PROCEDURE — 0BH17EZ INSERTION OF ENDOTRACHEAL AIRWAY INTO TRACHEA, VIA NATURAL OR ARTIFICIAL OPENING: ICD-10-PCS | Performed by: STUDENT IN AN ORGANIZED HEALTH CARE EDUCATION/TRAINING PROGRAM

## 2023-01-01 PROCEDURE — 92526 ORAL FUNCTION THERAPY: CPT | Performed by: SPEECH-LANGUAGE PATHOLOGIST

## 2023-01-01 PROCEDURE — 87636 SARSCOV2 & INF A&B AMP PRB: CPT

## 2023-01-01 PROCEDURE — 80053 COMPREHEN METABOLIC PANEL: CPT

## 2023-01-01 PROCEDURE — 92523 SPEECH SOUND LANG COMPREHEN: CPT

## 2023-01-01 PROCEDURE — 5A1955Z RESPIRATORY VENTILATION, GREATER THAN 96 CONSECUTIVE HOURS: ICD-10-PCS | Performed by: STUDENT IN AN ORGANIZED HEALTH CARE EDUCATION/TRAINING PROGRAM

## 2023-01-01 PROCEDURE — 3430000000 HC RX DIAGNOSTIC RADIOPHARMACEUTICAL: Performed by: STUDENT IN AN ORGANIZED HEALTH CARE EDUCATION/TRAINING PROGRAM

## 2023-01-01 PROCEDURE — 92523 SPEECH SOUND LANG COMPREHEN: CPT | Performed by: SPEECH-LANGUAGE PATHOLOGIST

## 2023-01-01 PROCEDURE — 85379 FIBRIN DEGRADATION QUANT: CPT

## 2023-01-01 PROCEDURE — 94660 CPAP INITIATION&MGMT: CPT

## 2023-01-01 PROCEDURE — 87798 DETECT AGENT NOS DNA AMP: CPT

## 2023-01-01 PROCEDURE — 84484 ASSAY OF TROPONIN QUANT: CPT

## 2023-01-01 PROCEDURE — 83880 ASSAY OF NATRIURETIC PEPTIDE: CPT

## 2023-01-01 PROCEDURE — 78582 LUNG VENTILAT&PERFUS IMAGING: CPT

## 2023-01-01 PROCEDURE — 82272 OCCULT BLD FECES 1-3 TESTS: CPT

## 2023-01-01 PROCEDURE — 76604 US EXAM CHEST: CPT

## 2023-01-01 PROCEDURE — 82248 BILIRUBIN DIRECT: CPT

## 2023-01-01 PROCEDURE — 94002 VENT MGMT INPAT INIT DAY: CPT

## 2023-01-01 PROCEDURE — 87150 DNA/RNA AMPLIFIED PROBE: CPT

## 2023-01-01 PROCEDURE — 86255 FLUORESCENT ANTIBODY SCREEN: CPT

## 2023-01-01 PROCEDURE — 85651 RBC SED RATE NONAUTOMATED: CPT

## 2023-01-01 PROCEDURE — A9540 TC99M MAA: HCPCS | Performed by: STUDENT IN AN ORGANIZED HEALTH CARE EDUCATION/TRAINING PROGRAM

## 2023-01-01 PROCEDURE — 2500000003 HC RX 250 WO HCPCS: Performed by: FAMILY MEDICINE

## 2023-01-01 PROCEDURE — 87641 MR-STAPH DNA AMP PROBE: CPT

## 2023-01-01 RX ORDER — POTASSIUM CHLORIDE 7.45 MG/ML
10 INJECTION INTRAVENOUS
Status: DISPENSED | OUTPATIENT
Start: 2023-01-01 | End: 2023-01-01

## 2023-01-01 RX ORDER — DEXMEDETOMIDINE HYDROCHLORIDE 4 UG/ML
.1-1.5 INJECTION, SOLUTION INTRAVENOUS CONTINUOUS
Status: DISCONTINUED | OUTPATIENT
Start: 2023-01-01 | End: 2023-01-01

## 2023-01-01 RX ORDER — FUROSEMIDE 10 MG/ML
40 INJECTION INTRAMUSCULAR; INTRAVENOUS ONCE
Status: COMPLETED | OUTPATIENT
Start: 2023-01-01 | End: 2023-01-01

## 2023-01-01 RX ORDER — ACETAMINOPHEN 325 MG/1
650 TABLET ORAL EVERY 4 HOURS PRN
Status: DISCONTINUED | OUTPATIENT
Start: 2023-01-01 | End: 2024-01-01 | Stop reason: HOSPADM

## 2023-01-01 RX ORDER — LANOLIN ALCOHOL/MO/W.PET/CERES
3 CREAM (GRAM) TOPICAL NIGHTLY PRN
Status: DISCONTINUED | OUTPATIENT
Start: 2023-01-01 | End: 2024-01-01

## 2023-01-01 RX ORDER — NITROGLYCERIN 0.4 MG/1
0.4 TABLET SUBLINGUAL EVERY 5 MIN PRN
Status: DISCONTINUED | OUTPATIENT
Start: 2023-01-01 | End: 2024-01-01

## 2023-01-01 RX ORDER — CALCIUM GLUCONATE 20 MG/ML
2000 INJECTION, SOLUTION INTRAVENOUS PRN
Status: DISCONTINUED | OUTPATIENT
Start: 2023-01-01 | End: 2023-01-01

## 2023-01-01 RX ORDER — KETAMINE HCL IN NACL, ISO-OSM 100MG/10ML
SYRINGE (ML) INJECTION
Status: COMPLETED | OUTPATIENT
Start: 2023-01-01 | End: 2023-01-01

## 2023-01-01 RX ORDER — XENON XE-133 10 MCI/1
5.4 GAS RESPIRATORY (INHALATION)
Status: COMPLETED | OUTPATIENT
Start: 2023-01-01 | End: 2023-01-01

## 2023-01-01 RX ORDER — BUMETANIDE 0.25 MG/ML
1 INJECTION INTRAMUSCULAR; INTRAVENOUS ONCE
Status: COMPLETED | OUTPATIENT
Start: 2023-01-01 | End: 2023-01-01

## 2023-01-01 RX ORDER — HALOPERIDOL 5 MG/ML
INJECTION INTRAMUSCULAR
Status: COMPLETED
Start: 2023-01-01 | End: 2023-01-01

## 2023-01-01 RX ORDER — HEPARIN SODIUM 10000 [USP'U]/100ML
5-30 INJECTION, SOLUTION INTRAVENOUS CONTINUOUS
Status: DISCONTINUED | OUTPATIENT
Start: 2023-01-01 | End: 2024-01-01 | Stop reason: HOSPADM

## 2023-01-01 RX ORDER — HEPARIN SODIUM 1000 [USP'U]/ML
2000 INJECTION, SOLUTION INTRAVENOUS; SUBCUTANEOUS PRN
Status: DISCONTINUED | OUTPATIENT
Start: 2023-01-01 | End: 2024-01-01

## 2023-01-01 RX ORDER — OXYCODONE HYDROCHLORIDE AND ACETAMINOPHEN 5; 325 MG/1; MG/1
0.5 TABLET ORAL EVERY 4 HOURS PRN
Status: DISCONTINUED | OUTPATIENT
Start: 2023-01-01 | End: 2024-01-01

## 2023-01-01 RX ORDER — HYDRALAZINE HYDROCHLORIDE 20 MG/ML
10 INJECTION INTRAMUSCULAR; INTRAVENOUS EVERY 6 HOURS PRN
Status: DISCONTINUED | OUTPATIENT
Start: 2023-01-01 | End: 2024-01-01

## 2023-01-01 RX ORDER — HEPARIN SODIUM 1000 [USP'U]/ML
4000 INJECTION, SOLUTION INTRAVENOUS; SUBCUTANEOUS ONCE
Status: COMPLETED | OUTPATIENT
Start: 2023-01-01 | End: 2023-01-01

## 2023-01-01 RX ORDER — IPRATROPIUM BROMIDE AND ALBUTEROL SULFATE 2.5; .5 MG/3ML; MG/3ML
1 SOLUTION RESPIRATORY (INHALATION) ONCE
Status: DISCONTINUED | OUTPATIENT
Start: 2023-01-01 | End: 2023-01-01

## 2023-01-01 RX ORDER — HEPARIN SODIUM 1000 [USP'U]/ML
4000 INJECTION, SOLUTION INTRAVENOUS; SUBCUTANEOUS PRN
Status: DISCONTINUED | OUTPATIENT
Start: 2023-01-01 | End: 2024-01-01

## 2023-01-01 RX ORDER — SODIUM CHLORIDE 9 MG/ML
INJECTION, SOLUTION INTRAVENOUS CONTINUOUS
Status: DISCONTINUED | OUTPATIENT
Start: 2023-01-01 | End: 2023-01-01

## 2023-01-01 RX ORDER — HYDRALAZINE HYDROCHLORIDE 20 MG/ML
10 INJECTION INTRAMUSCULAR; INTRAVENOUS ONCE
Status: COMPLETED | OUTPATIENT
Start: 2023-01-01 | End: 2023-01-01

## 2023-01-01 RX ORDER — SODIUM CHLORIDE 9 MG/ML
INJECTION, SOLUTION INTRAVENOUS PRN
Status: DISCONTINUED | OUTPATIENT
Start: 2023-01-01 | End: 2023-01-01

## 2023-01-01 RX ORDER — HALOPERIDOL 5 MG/ML
10 INJECTION INTRAMUSCULAR ONCE
Status: COMPLETED | OUTPATIENT
Start: 2023-01-01 | End: 2023-01-01

## 2023-01-01 RX ORDER — NOREPINEPHRINE BITARTRATE 0.06 MG/ML
INJECTION, SOLUTION INTRAVENOUS
Status: COMPLETED
Start: 2023-01-01 | End: 2023-01-01

## 2023-01-01 RX ORDER — AMLODIPINE BESYLATE 10 MG/1
10 TABLET ORAL DAILY
Status: DISCONTINUED | OUTPATIENT
Start: 2023-01-01 | End: 2024-01-01 | Stop reason: HOSPADM

## 2023-01-01 RX ORDER — HYDRALAZINE HYDROCHLORIDE 20 MG/ML
10 INJECTION INTRAMUSCULAR; INTRAVENOUS EVERY 6 HOURS PRN
Status: DISCONTINUED | OUTPATIENT
Start: 2023-01-01 | End: 2023-01-01

## 2023-01-01 RX ORDER — KETAMINE HCL IN NACL, ISO-OSM 100MG/10ML
SYRINGE (ML) INJECTION
Status: DISPENSED
Start: 2023-01-01 | End: 2023-01-01

## 2023-01-01 RX ORDER — GABAPENTIN 300 MG/1
300 CAPSULE ORAL DAILY
Status: DISCONTINUED | OUTPATIENT
Start: 2023-01-01 | End: 2023-01-01

## 2023-01-01 RX ORDER — NOREPINEPHRINE BITARTRATE 0.06 MG/ML
1-100 INJECTION, SOLUTION INTRAVENOUS CONTINUOUS
Status: DISCONTINUED | OUTPATIENT
Start: 2023-01-01 | End: 2023-01-01

## 2023-01-01 RX ORDER — DEXTROSE MONOHYDRATE 25 G/50ML
25 INJECTION, SOLUTION INTRAVENOUS ONCE
Status: DISCONTINUED | OUTPATIENT
Start: 2023-01-01 | End: 2023-01-01 | Stop reason: RX

## 2023-01-01 RX ORDER — CALCIUM GLUCONATE 10 MG/ML
1000 INJECTION, SOLUTION INTRAVENOUS ONCE
Status: COMPLETED | OUTPATIENT
Start: 2023-01-01 | End: 2023-01-01

## 2023-01-01 RX ORDER — HYDRALAZINE HYDROCHLORIDE 20 MG/ML
INJECTION INTRAMUSCULAR; INTRAVENOUS
Status: COMPLETED
Start: 2023-01-01 | End: 2023-01-01

## 2023-01-01 RX ORDER — CALCIUM GLUCONATE 20 MG/ML
2000 INJECTION, SOLUTION INTRAVENOUS ONCE
Status: COMPLETED | OUTPATIENT
Start: 2023-01-01 | End: 2023-01-01

## 2023-01-01 RX ORDER — HYDROXYZINE PAMOATE 25 MG/1
50 CAPSULE ORAL 3 TIMES DAILY
Status: DISPENSED | OUTPATIENT
Start: 2023-01-01 | End: 2023-01-01

## 2023-01-01 RX ORDER — DEXAMETHASONE SODIUM PHOSPHATE 4 MG/ML
10 INJECTION, SOLUTION INTRA-ARTICULAR; INTRALESIONAL; INTRAMUSCULAR; INTRAVENOUS; SOFT TISSUE ONCE
Status: COMPLETED | OUTPATIENT
Start: 2023-01-01 | End: 2023-01-01

## 2023-01-01 RX ORDER — MIDAZOLAM HYDROCHLORIDE 1 MG/ML
INJECTION INTRAMUSCULAR; INTRAVENOUS
Status: COMPLETED | OUTPATIENT
Start: 2023-01-01 | End: 2023-01-01

## 2023-01-01 RX ORDER — GABAPENTIN 300 MG/1
600 CAPSULE ORAL DAILY
Status: DISCONTINUED | OUTPATIENT
Start: 2023-01-01 | End: 2023-01-01

## 2023-01-01 RX ORDER — POLYETHYLENE GLYCOL 3350 17 G/17G
17 POWDER, FOR SOLUTION ORAL 2 TIMES DAILY
Status: DISCONTINUED | OUTPATIENT
Start: 2023-01-01 | End: 2024-01-01 | Stop reason: HOSPADM

## 2023-01-01 RX ORDER — INSULIN LISPRO 100 [IU]/ML
0-4 INJECTION, SOLUTION INTRAVENOUS; SUBCUTANEOUS
Status: DISCONTINUED | OUTPATIENT
Start: 2023-01-01 | End: 2024-01-01 | Stop reason: HOSPADM

## 2023-01-01 RX ORDER — METHYLPREDNISOLONE SODIUM SUCCINATE 1 G/16ML
40 INJECTION, POWDER, LYOPHILIZED, FOR SOLUTION INTRAMUSCULAR; INTRAVENOUS EVERY 24 HOURS
Status: DISCONTINUED | OUTPATIENT
Start: 2023-01-01 | End: 2023-01-01

## 2023-01-01 RX ORDER — PRAVASTATIN SODIUM 10 MG
10 TABLET ORAL NIGHTLY
Status: DISCONTINUED | OUTPATIENT
Start: 2023-01-01 | End: 2023-01-01

## 2023-01-01 RX ORDER — PROPOFOL 10 MG/ML
5-50 INJECTION, EMULSION INTRAVENOUS CONTINUOUS
Status: DISCONTINUED | OUTPATIENT
Start: 2023-01-01 | End: 2023-01-01

## 2023-01-01 RX ORDER — SODIUM CHLORIDE 9 MG/ML
INJECTION, SOLUTION INTRAVENOUS PRN
Status: DISCONTINUED | OUTPATIENT
Start: 2023-01-01 | End: 2024-01-01 | Stop reason: HOSPADM

## 2023-01-01 RX ORDER — SODIUM CHLORIDE FOR INHALATION 0.9 %
3 VIAL, NEBULIZER (ML) INHALATION 2 TIMES DAILY
Status: DISCONTINUED | OUTPATIENT
Start: 2023-01-01 | End: 2023-01-01

## 2023-01-01 RX ORDER — DEXTROSE MONOHYDRATE 100 MG/ML
INJECTION, SOLUTION INTRAVENOUS CONTINUOUS PRN
Status: DISCONTINUED | OUTPATIENT
Start: 2023-01-01 | End: 2024-01-01 | Stop reason: HOSPADM

## 2023-01-01 RX ORDER — CLONIDINE HYDROCHLORIDE 0.1 MG/1
0.1 TABLET ORAL EVERY 8 HOURS
Status: DISCONTINUED | OUTPATIENT
Start: 2023-01-01 | End: 2024-01-01 | Stop reason: HOSPADM

## 2023-01-01 RX ORDER — FUROSEMIDE 20 MG/1
20 TABLET ORAL DAILY
Status: DISCONTINUED | OUTPATIENT
Start: 2023-01-01 | End: 2023-01-01

## 2023-01-01 RX ORDER — HYDRALAZINE HYDROCHLORIDE 20 MG/ML
20 INJECTION INTRAMUSCULAR; INTRAVENOUS EVERY 6 HOURS PRN
Status: DISCONTINUED | OUTPATIENT
Start: 2023-01-01 | End: 2023-01-01

## 2023-01-01 RX ORDER — PRAVASTATIN SODIUM 10 MG
10 TABLET ORAL NIGHTLY
Status: DISCONTINUED | OUTPATIENT
Start: 2023-01-01 | End: 2024-01-01 | Stop reason: HOSPADM

## 2023-01-01 RX ORDER — MIDAZOLAM HYDROCHLORIDE 1 MG/ML
INJECTION INTRAMUSCULAR; INTRAVENOUS
Status: DISPENSED
Start: 2023-01-01 | End: 2023-01-01

## 2023-01-01 RX ORDER — IPRATROPIUM BROMIDE AND ALBUTEROL SULFATE 2.5; .5 MG/3ML; MG/3ML
1 SOLUTION RESPIRATORY (INHALATION)
Status: DISCONTINUED | OUTPATIENT
Start: 2023-01-01 | End: 2023-01-01

## 2023-01-01 RX ORDER — BUMETANIDE 0.25 MG/ML
1 INJECTION INTRAMUSCULAR; INTRAVENOUS 2 TIMES DAILY
Status: DISCONTINUED | OUTPATIENT
Start: 2023-01-01 | End: 2024-01-01

## 2023-01-01 RX ORDER — ALBUTEROL SULFATE 2.5 MG/3ML
10 SOLUTION RESPIRATORY (INHALATION) EVERY 4 HOURS PRN
Status: DISCONTINUED | OUTPATIENT
Start: 2023-01-01 | End: 2024-01-01 | Stop reason: HOSPADM

## 2023-01-01 RX ORDER — LORAZEPAM 2 MG/ML
4 INJECTION INTRAMUSCULAR EVERY 4 HOURS PRN
Status: DISCONTINUED | OUTPATIENT
Start: 2023-01-01 | End: 2023-01-01

## 2023-01-01 RX ORDER — SODIUM CHLORIDE 9 MG/ML
INJECTION, SOLUTION INTRAVENOUS CONTINUOUS
Status: ACTIVE | OUTPATIENT
Start: 2023-01-01 | End: 2023-01-01

## 2023-01-01 RX ORDER — FUROSEMIDE 10 MG/ML
20 INJECTION INTRAMUSCULAR; INTRAVENOUS DAILY
Status: DISCONTINUED | OUTPATIENT
Start: 2023-01-01 | End: 2023-01-01

## 2023-01-01 RX ORDER — ALBUTEROL SULFATE 2.5 MG/3ML
2.5 SOLUTION RESPIRATORY (INHALATION) EVERY 6 HOURS PRN
Status: DISCONTINUED | OUTPATIENT
Start: 2023-01-01 | End: 2024-01-01 | Stop reason: HOSPADM

## 2023-01-01 RX ORDER — ONDANSETRON 2 MG/ML
4 INJECTION INTRAMUSCULAR; INTRAVENOUS EVERY 6 HOURS PRN
Status: DISCONTINUED | OUTPATIENT
Start: 2023-01-01 | End: 2024-01-01 | Stop reason: HOSPADM

## 2023-01-01 RX ORDER — GABAPENTIN 300 MG/1
600 CAPSULE ORAL 3 TIMES DAILY
Status: DISCONTINUED | OUTPATIENT
Start: 2023-01-01 | End: 2023-01-01

## 2023-01-01 RX ORDER — SENNA AND DOCUSATE SODIUM 50; 8.6 MG/1; MG/1
1 TABLET, FILM COATED ORAL 2 TIMES DAILY
Status: DISCONTINUED | OUTPATIENT
Start: 2023-01-01 | End: 2024-01-01 | Stop reason: HOSPADM

## 2023-01-01 RX ORDER — DEXTROSE MONOHYDRATE 50 MG/ML
INJECTION, SOLUTION INTRAVENOUS CONTINUOUS
Status: ACTIVE | OUTPATIENT
Start: 2023-01-01 | End: 2023-01-01

## 2023-01-01 RX ORDER — HYDROXYZINE HYDROCHLORIDE 50 MG/ML
25 INJECTION, SOLUTION INTRAMUSCULAR EVERY 6 HOURS PRN
Status: DISCONTINUED | OUTPATIENT
Start: 2023-01-01 | End: 2023-01-01

## 2023-01-01 RX ORDER — PANTOPRAZOLE SODIUM 40 MG/1
40 TABLET, DELAYED RELEASE ORAL
Status: DISCONTINUED | OUTPATIENT
Start: 2023-01-01 | End: 2023-01-01 | Stop reason: CLARIF

## 2023-01-01 RX ORDER — QUETIAPINE FUMARATE 25 MG/1
25 TABLET, FILM COATED ORAL NIGHTLY
Status: DISCONTINUED | OUTPATIENT
Start: 2023-01-01 | End: 2024-01-01 | Stop reason: HOSPADM

## 2023-01-01 RX ORDER — INSULIN GLARGINE 100 [IU]/ML
4 INJECTION, SOLUTION SUBCUTANEOUS EVERY MORNING
Status: DISCONTINUED | OUTPATIENT
Start: 2023-01-01 | End: 2024-01-01 | Stop reason: HOSPADM

## 2023-01-01 RX ORDER — TAMSULOSIN HYDROCHLORIDE 0.4 MG/1
0.4 CAPSULE ORAL DAILY
Status: DISCONTINUED | OUTPATIENT
Start: 2023-01-01 | End: 2024-01-01 | Stop reason: HOSPADM

## 2023-01-01 RX ORDER — CLONIDINE HYDROCHLORIDE 0.1 MG/1
0.1 TABLET ORAL 2 TIMES DAILY
Status: DISCONTINUED | OUTPATIENT
Start: 2023-01-01 | End: 2023-01-01

## 2023-01-01 RX ORDER — SODIUM CHLORIDE FOR INHALATION 3 %
4 VIAL, NEBULIZER (ML) INHALATION 2 TIMES DAILY
Status: DISCONTINUED | OUTPATIENT
Start: 2023-01-01 | End: 2024-01-01 | Stop reason: HOSPADM

## 2023-01-01 RX ORDER — LANSOPRAZOLE 30 MG/1
30 TABLET, ORALLY DISINTEGRATING, DELAYED RELEASE ORAL
Status: DISCONTINUED | OUTPATIENT
Start: 2023-01-01 | End: 2024-01-01 | Stop reason: HOSPADM

## 2023-01-01 RX ORDER — POTASSIUM CHLORIDE 7.45 MG/ML
10 INJECTION INTRAVENOUS ONCE
Status: COMPLETED | OUTPATIENT
Start: 2023-01-01 | End: 2023-01-01

## 2023-01-01 RX ORDER — ACETYLCYSTEINE 200 MG/ML
600 SOLUTION ORAL; RESPIRATORY (INHALATION)
Status: DISCONTINUED | OUTPATIENT
Start: 2023-01-01 | End: 2023-01-01

## 2023-01-01 RX ORDER — PROPOFOL 10 MG/ML
INJECTION, EMULSION INTRAVENOUS
Status: COMPLETED
Start: 2023-01-01 | End: 2023-01-01

## 2023-01-01 RX ORDER — GABAPENTIN 300 MG/1
300 CAPSULE ORAL 2 TIMES DAILY
Status: DISCONTINUED | OUTPATIENT
Start: 2023-01-01 | End: 2024-01-01 | Stop reason: HOSPADM

## 2023-01-01 RX ORDER — METOPROLOL SUCCINATE 25 MG/1
25 TABLET, EXTENDED RELEASE ORAL DAILY
Status: DISCONTINUED | OUTPATIENT
Start: 2023-01-01 | End: 2023-01-01

## 2023-01-01 RX ORDER — ENOXAPARIN SODIUM 100 MG/ML
30 INJECTION SUBCUTANEOUS DAILY
Status: DISCONTINUED | OUTPATIENT
Start: 2023-01-01 | End: 2023-01-01

## 2023-01-01 RX ORDER — MAGNESIUM SULFATE IN WATER 40 MG/ML
2000 INJECTION, SOLUTION INTRAVENOUS ONCE
Status: COMPLETED | OUTPATIENT
Start: 2023-01-01 | End: 2024-01-01

## 2023-01-01 RX ORDER — GUAIFENESIN/DEXTROMETHORPHAN 100-10MG/5
5 SYRUP ORAL EVERY 4 HOURS PRN
Status: DISCONTINUED | OUTPATIENT
Start: 2023-01-01 | End: 2023-01-01

## 2023-01-01 RX ORDER — ASPIRIN 81 MG/1
81 TABLET, CHEWABLE ORAL DAILY
Status: DISCONTINUED | OUTPATIENT
Start: 2023-01-01 | End: 2024-01-01 | Stop reason: HOSPADM

## 2023-01-01 RX ORDER — FLUMAZENIL 0.1 MG/ML
0.4 INJECTION INTRAVENOUS ONCE
Status: COMPLETED | OUTPATIENT
Start: 2023-01-01 | End: 2023-01-01

## 2023-01-01 RX ORDER — BISACODYL 10 MG
10 SUPPOSITORY, RECTAL RECTAL DAILY
Status: DISCONTINUED | OUTPATIENT
Start: 2023-01-01 | End: 2024-01-01 | Stop reason: HOSPADM

## 2023-01-01 RX ORDER — HEPARIN SODIUM 10000 [USP'U]/100ML
5-30 INJECTION, SOLUTION INTRAVENOUS CONTINUOUS
Status: DISCONTINUED | OUTPATIENT
Start: 2023-01-01 | End: 2023-01-01

## 2023-01-01 RX ORDER — POTASSIUM CHLORIDE 20 MEQ/1
40 TABLET, EXTENDED RELEASE ORAL PRN
Status: DISCONTINUED | OUTPATIENT
Start: 2023-01-01 | End: 2024-01-01

## 2023-01-01 RX ORDER — PROPOFOL 10 MG/ML
5-60 INJECTION, EMULSION INTRAVENOUS CONTINUOUS
Status: DISCONTINUED | OUTPATIENT
Start: 2023-01-01 | End: 2023-01-01

## 2023-01-01 RX ORDER — OXYCODONE HYDROCHLORIDE AND ACETAMINOPHEN 5; 325 MG/1; MG/1
1 TABLET ORAL EVERY 4 HOURS PRN
Status: DISCONTINUED | OUTPATIENT
Start: 2023-01-01 | End: 2024-01-01

## 2023-01-01 RX ORDER — HYDROCODONE BITARTRATE AND ACETAMINOPHEN 5; 325 MG/1; MG/1
1 TABLET ORAL EVERY 6 HOURS PRN
Status: DISCONTINUED | OUTPATIENT
Start: 2023-01-01 | End: 2023-01-01

## 2023-01-01 RX ORDER — IBUPROFEN 600 MG/1
1 TABLET ORAL PRN
Status: DISCONTINUED | OUTPATIENT
Start: 2023-01-01 | End: 2024-01-01 | Stop reason: HOSPADM

## 2023-01-01 RX ORDER — MORPHINE SULFATE 4 MG/ML
4 INJECTION, SOLUTION INTRAMUSCULAR; INTRAVENOUS EVERY 4 HOURS PRN
Status: DISCONTINUED | OUTPATIENT
Start: 2023-01-01 | End: 2023-01-01

## 2023-01-01 RX ORDER — POLYETHYLENE GLYCOL 3350 17 G/17G
17 POWDER, FOR SOLUTION ORAL DAILY
Status: DISCONTINUED | OUTPATIENT
Start: 2023-01-01 | End: 2023-01-01

## 2023-01-01 RX ORDER — HYDROXYZINE PAMOATE 50 MG/1
50 CAPSULE ORAL 4 TIMES DAILY PRN
Status: COMPLETED | OUTPATIENT
Start: 2023-01-01 | End: 2023-01-01

## 2023-01-01 RX ORDER — HYDROXYZINE HYDROCHLORIDE 50 MG/ML
50 INJECTION, SOLUTION INTRAMUSCULAR ONCE
Status: DISCONTINUED | OUTPATIENT
Start: 2023-01-01 | End: 2023-01-01

## 2023-01-01 RX ORDER — ALBUTEROL SULFATE 2.5 MG/3ML
2.5 SOLUTION RESPIRATORY (INHALATION) ONCE
Status: COMPLETED | OUTPATIENT
Start: 2023-01-01 | End: 2023-01-01

## 2023-01-01 RX ORDER — INSULIN LISPRO 100 [IU]/ML
0-4 INJECTION, SOLUTION INTRAVENOUS; SUBCUTANEOUS NIGHTLY
Status: DISCONTINUED | OUTPATIENT
Start: 2023-01-01 | End: 2024-01-01 | Stop reason: HOSPADM

## 2023-01-01 RX ORDER — FERROUS SULFATE 325(65) MG
325 TABLET ORAL 2 TIMES DAILY WITH MEALS
Status: DISCONTINUED | OUTPATIENT
Start: 2023-01-01 | End: 2023-01-01

## 2023-01-01 RX ORDER — PREDNISONE 20 MG/1
40 TABLET ORAL DAILY
Status: DISCONTINUED | OUTPATIENT
Start: 2023-01-01 | End: 2023-01-01

## 2023-01-01 RX ORDER — METOPROLOL SUCCINATE 50 MG/1
50 TABLET, EXTENDED RELEASE ORAL DAILY
Status: DISCONTINUED | OUTPATIENT
Start: 2023-01-01 | End: 2023-01-01

## 2023-01-01 RX ORDER — POTASSIUM CHLORIDE 7.45 MG/ML
10 INJECTION INTRAVENOUS PRN
Status: DISCONTINUED | OUTPATIENT
Start: 2023-01-01 | End: 2024-01-01

## 2023-01-01 RX ADMIN — GABAPENTIN 300 MG: 300 CAPSULE ORAL at 21:16

## 2023-01-01 RX ADMIN — INSULIN GLARGINE 4 UNITS: 100 INJECTION, SOLUTION SUBCUTANEOUS at 08:03

## 2023-01-01 RX ADMIN — Medication 3 MG: at 02:30

## 2023-01-01 RX ADMIN — POLYETHYLENE GLYCOL 3350 17 G: 17 POWDER, FOR SOLUTION ORAL at 10:40

## 2023-01-01 RX ADMIN — DICLOFENAC SODIUM 2 G: 10 GEL TOPICAL at 14:29

## 2023-01-01 RX ADMIN — INSULIN LISPRO 2 UNITS: 100 INJECTION, SOLUTION INTRAVENOUS; SUBCUTANEOUS at 13:44

## 2023-01-01 RX ADMIN — PROPOFOL 15 MCG/KG/MIN: 10 INJECTION, EMULSION INTRAVENOUS at 08:52

## 2023-01-01 RX ADMIN — TIOTROPIUM BROMIDE AND OLODATEROL 4 PUFF: 3.124; 2.736 SPRAY, METERED RESPIRATORY (INHALATION) at 08:42

## 2023-01-01 RX ADMIN — ACETAMINOPHEN 650 MG: 325 TABLET ORAL at 20:45

## 2023-01-01 RX ADMIN — INSULIN GLARGINE 4 UNITS: 100 INJECTION, SOLUTION SUBCUTANEOUS at 10:30

## 2023-01-01 RX ADMIN — TAMSULOSIN HYDROCHLORIDE 0.4 MG: 0.4 CAPSULE ORAL at 08:37

## 2023-01-01 RX ADMIN — HEPARIN SODIUM 2000 UNITS: 1000 INJECTION INTRAVENOUS; SUBCUTANEOUS at 22:22

## 2023-01-01 RX ADMIN — Medication 4 ML: at 17:06

## 2023-01-01 RX ADMIN — OXYCODONE AND ACETAMINOPHEN 1 TABLET: 5; 325 TABLET ORAL at 20:47

## 2023-01-01 RX ADMIN — HEPARIN SODIUM 2000 UNITS: 1000 INJECTION INTRAVENOUS; SUBCUTANEOUS at 07:06

## 2023-01-01 RX ADMIN — CLONIDINE HYDROCHLORIDE 0.1 MG: 0.1 TABLET ORAL at 12:51

## 2023-01-01 RX ADMIN — LANSOPRAZOLE 30 MG: 30 TABLET, ORALLY DISINTEGRATING, DELAYED RELEASE ORAL at 05:27

## 2023-01-01 RX ADMIN — MAGNESIUM SULFATE HEPTAHYDRATE 2000 MG: 40 INJECTION, SOLUTION INTRAVENOUS at 23:29

## 2023-01-01 RX ADMIN — HEPARIN SODIUM 2000 UNITS: 1000 INJECTION INTRAVENOUS; SUBCUTANEOUS at 22:18

## 2023-01-01 RX ADMIN — HEPARIN SODIUM 21 UNITS/KG/HR: 10000 INJECTION, SOLUTION INTRAVENOUS at 07:45

## 2023-01-01 RX ADMIN — CLONIDINE HYDROCHLORIDE 0.1 MG: 0.1 TABLET ORAL at 18:26

## 2023-01-01 RX ADMIN — OLANZAPINE 7.5 MG: 5 TABLET, FILM COATED ORAL at 20:23

## 2023-01-01 RX ADMIN — HYDRALAZINE HYDROCHLORIDE 10 MG: 20 INJECTION INTRAMUSCULAR; INTRAVENOUS at 05:04

## 2023-01-01 RX ADMIN — CLONIDINE HYDROCHLORIDE 0.1 MG: 0.1 TABLET ORAL at 09:15

## 2023-01-01 RX ADMIN — CEFTRIAXONE SODIUM 2000 MG: 2 INJECTION, POWDER, FOR SOLUTION INTRAMUSCULAR; INTRAVENOUS at 17:08

## 2023-01-01 RX ADMIN — POLYETHYLENE GLYCOL 3350 17 G: 17 POWDER, FOR SOLUTION ORAL at 08:11

## 2023-01-01 RX ADMIN — CALCIUM GLUCONATE 1000 MG: 10 INJECTION, SOLUTION INTRAVENOUS at 13:24

## 2023-01-01 RX ADMIN — OXYCODONE AND ACETAMINOPHEN 1 TABLET: 5; 325 TABLET ORAL at 03:18

## 2023-01-01 RX ADMIN — CEFEPIME 1000 MG: 1 INJECTION, POWDER, FOR SOLUTION INTRAMUSCULAR; INTRAVENOUS at 23:06

## 2023-01-01 RX ADMIN — BUMETANIDE 1 MG: 0.25 INJECTION INTRAMUSCULAR; INTRAVENOUS at 19:27

## 2023-01-01 RX ADMIN — PROPOFOL 25 MCG/KG/MIN: 10 INJECTION, EMULSION INTRAVENOUS at 19:41

## 2023-01-01 RX ADMIN — HEPARIN SODIUM 21 UNITS/KG/HR: 10000 INJECTION, SOLUTION INTRAVENOUS at 04:19

## 2023-01-01 RX ADMIN — HEPARIN SODIUM 21 UNITS/KG/HR: 10000 INJECTION, SOLUTION INTRAVENOUS at 17:29

## 2023-01-01 RX ADMIN — MORPHINE SULFATE 4 MG: 4 INJECTION, SOLUTION INTRAMUSCULAR; INTRAVENOUS at 09:54

## 2023-01-01 RX ADMIN — LANSOPRAZOLE 30 MG: 30 TABLET, ORALLY DISINTEGRATING, DELAYED RELEASE ORAL at 05:03

## 2023-01-01 RX ADMIN — PRAVASTATIN SODIUM 10 MG: 10 TABLET ORAL at 19:43

## 2023-01-01 RX ADMIN — PRAVASTATIN SODIUM 10 MG: 10 TABLET ORAL at 20:33

## 2023-01-01 RX ADMIN — INSULIN GLARGINE 4 UNITS: 100 INJECTION, SOLUTION SUBCUTANEOUS at 09:16

## 2023-01-01 RX ADMIN — HYDRALAZINE HYDROCHLORIDE 10 MG: 20 INJECTION INTRAMUSCULAR; INTRAVENOUS at 23:30

## 2023-01-01 RX ADMIN — HEPARIN SODIUM 21 UNITS/KG/HR: 10000 INJECTION, SOLUTION INTRAVENOUS at 14:44

## 2023-01-01 RX ADMIN — MORPHINE SULFATE 4 MG: 4 INJECTION, SOLUTION INTRAMUSCULAR; INTRAVENOUS at 02:30

## 2023-01-01 RX ADMIN — Medication 4 ML: at 18:05

## 2023-01-01 RX ADMIN — BUMETANIDE 1 MG: 0.25 INJECTION INTRAMUSCULAR; INTRAVENOUS at 11:58

## 2023-01-01 RX ADMIN — MORPHINE SULFATE 4 MG: 4 INJECTION, SOLUTION INTRAMUSCULAR; INTRAVENOUS at 23:20

## 2023-01-01 RX ADMIN — INSULIN GLARGINE 4 UNITS: 100 INJECTION, SOLUTION SUBCUTANEOUS at 07:47

## 2023-01-01 RX ADMIN — GABAPENTIN 300 MG: 300 CAPSULE ORAL at 21:46

## 2023-01-01 RX ADMIN — PROPOFOL 20 MCG/KG/MIN: 10 INJECTION, EMULSION INTRAVENOUS at 10:13

## 2023-01-01 RX ADMIN — DOCUSATE SODIUM 100 MG: 50 LIQUID ORAL at 08:32

## 2023-01-01 RX ADMIN — ASPIRIN 81 MG: 81 TABLET, CHEWABLE ORAL at 08:48

## 2023-01-01 RX ADMIN — HEPARIN SODIUM 21 UNITS/KG/HR: 10000 INJECTION, SOLUTION INTRAVENOUS at 22:14

## 2023-01-01 RX ADMIN — HYDRALAZINE HYDROCHLORIDE 10 MG: 20 INJECTION INTRAMUSCULAR; INTRAVENOUS at 09:15

## 2023-01-01 RX ADMIN — Medication 4 ML: at 18:24

## 2023-01-01 RX ADMIN — PROPOFOL 35 MCG/KG/MIN: 10 INJECTION, EMULSION INTRAVENOUS at 16:56

## 2023-01-01 RX ADMIN — FUROSEMIDE 40 MG: 10 INJECTION, SOLUTION INTRAMUSCULAR; INTRAVENOUS at 18:55

## 2023-01-01 RX ADMIN — HEPARIN SODIUM 24 UNITS/KG/HR: 10000 INJECTION, SOLUTION INTRAVENOUS at 05:32

## 2023-01-01 RX ADMIN — DOCUSATE SODIUM 50 MG AND SENNOSIDES 8.6 MG 1 TABLET: 8.6; 5 TABLET, FILM COATED ORAL at 08:21

## 2023-01-01 RX ADMIN — Medication 3 MG: at 20:46

## 2023-01-01 RX ADMIN — HEPARIN SODIUM 21 UNITS/KG/HR: 10000 INJECTION, SOLUTION INTRAVENOUS at 09:26

## 2023-01-01 RX ADMIN — INSULIN LISPRO 1 UNITS: 100 INJECTION, SOLUTION INTRAVENOUS; SUBCUTANEOUS at 08:29

## 2023-01-01 RX ADMIN — Medication 4 ML: at 21:13

## 2023-01-01 RX ADMIN — GABAPENTIN 300 MG: 300 CAPSULE ORAL at 08:33

## 2023-01-01 RX ADMIN — HEPARIN SODIUM 2000 UNITS: 1000 INJECTION INTRAVENOUS; SUBCUTANEOUS at 03:24

## 2023-01-01 RX ADMIN — TIOTROPIUM BROMIDE AND OLODATEROL 4 PUFF: 3.124; 2.736 SPRAY, METERED RESPIRATORY (INHALATION) at 09:39

## 2023-01-01 RX ADMIN — Medication 4 ML: at 18:34

## 2023-01-01 RX ADMIN — INSULIN GLARGINE 4 UNITS: 100 INJECTION, SOLUTION SUBCUTANEOUS at 07:36

## 2023-01-01 RX ADMIN — IPRATROPIUM BROMIDE AND ALBUTEROL SULFATE 1 DOSE: .5; 3 SOLUTION RESPIRATORY (INHALATION) at 22:10

## 2023-01-01 RX ADMIN — OXYCODONE AND ACETAMINOPHEN 1 TABLET: 5; 325 TABLET ORAL at 12:04

## 2023-01-01 RX ADMIN — NOREPINEPHRINE BITARTRATE 5 MCG/MIN: 0.06 INJECTION, SOLUTION INTRAVENOUS at 16:37

## 2023-01-01 RX ADMIN — CLONIDINE HYDROCHLORIDE 0.1 MG: 0.1 TABLET ORAL at 18:23

## 2023-01-01 RX ADMIN — OXYCODONE AND ACETAMINOPHEN 1 TABLET: 5; 325 TABLET ORAL at 11:53

## 2023-01-01 RX ADMIN — OXYCODONE AND ACETAMINOPHEN 1 TABLET: 5; 325 TABLET ORAL at 02:04

## 2023-01-01 RX ADMIN — ACETAMINOPHEN 650 MG: 325 TABLET ORAL at 18:43

## 2023-01-01 RX ADMIN — GABAPENTIN 300 MG: 300 CAPSULE ORAL at 10:05

## 2023-01-01 RX ADMIN — PROPOFOL 30 MCG/KG/MIN: 10 INJECTION, EMULSION INTRAVENOUS at 07:45

## 2023-01-01 RX ADMIN — BARIUM SULFATE 20 ML: 400 SUSPENSION ORAL at 10:34

## 2023-01-01 RX ADMIN — DOCUSATE SODIUM 100 MG: 50 LIQUID ORAL at 08:02

## 2023-01-01 RX ADMIN — Medication 3 MG: at 01:06

## 2023-01-01 RX ADMIN — BARIUM SULFATE 10 ML: 400 PASTE ORAL at 10:33

## 2023-01-01 RX ADMIN — HYDRALAZINE HYDROCHLORIDE 10 MG: 20 INJECTION INTRAMUSCULAR; INTRAVENOUS at 02:55

## 2023-01-01 RX ADMIN — PROPOFOL 20 MCG/KG/MIN: 10 INJECTION, EMULSION INTRAVENOUS at 18:25

## 2023-01-01 RX ADMIN — PIPERACILLIN AND TAZOBACTAM 3375 MG: 3; .375 INJECTION, POWDER, LYOPHILIZED, FOR SOLUTION INTRAVENOUS at 23:30

## 2023-01-01 RX ADMIN — FERROUS SULFATE TAB 325 MG (65 MG ELEMENTAL FE) 325 MG: 325 (65 FE) TAB at 17:07

## 2023-01-01 RX ADMIN — HYDRALAZINE HYDROCHLORIDE 10 MG: 20 INJECTION INTRAMUSCULAR; INTRAVENOUS at 02:09

## 2023-01-01 RX ADMIN — CEFEPIME 1000 MG: 1 INJECTION, POWDER, FOR SOLUTION INTRAMUSCULAR; INTRAVENOUS at 12:11

## 2023-01-01 RX ADMIN — CEFEPIME 1000 MG: 1 INJECTION, POWDER, FOR SOLUTION INTRAMUSCULAR; INTRAVENOUS at 11:25

## 2023-01-01 RX ADMIN — CALCIUM GLUCONATE 2000 MG: 20 INJECTION, SOLUTION INTRAVENOUS at 21:13

## 2023-01-01 RX ADMIN — PROPOFOL 15 MCG/KG/MIN: 10 INJECTION, EMULSION INTRAVENOUS at 09:02

## 2023-01-01 RX ADMIN — ALBUTEROL SULFATE 2.5 MG: 2.5 SOLUTION RESPIRATORY (INHALATION) at 09:57

## 2023-01-01 RX ADMIN — DICLOFENAC SODIUM 2 G: 10 GEL TOPICAL at 08:22

## 2023-01-01 RX ADMIN — PROPOFOL 30 MCG/KG/MIN: 10 INJECTION, EMULSION INTRAVENOUS at 14:17

## 2023-01-01 RX ADMIN — LANSOPRAZOLE 30 MG: 30 TABLET, ORALLY DISINTEGRATING, DELAYED RELEASE ORAL at 05:08

## 2023-01-01 RX ADMIN — TIOTROPIUM BROMIDE AND OLODATEROL 4 PUFF: 3.124; 2.736 SPRAY, METERED RESPIRATORY (INHALATION) at 06:12

## 2023-01-01 RX ADMIN — PREDNISONE 40 MG: 20 TABLET ORAL at 11:20

## 2023-01-01 RX ADMIN — Medication 3 MG: at 19:28

## 2023-01-01 RX ADMIN — Medication 3 MG: at 23:30

## 2023-01-01 RX ADMIN — POLYETHYLENE GLYCOL 3350 17 G: 17 POWDER, FOR SOLUTION ORAL at 08:21

## 2023-01-01 RX ADMIN — ASPIRIN 81 MG: 81 TABLET, CHEWABLE ORAL at 08:33

## 2023-01-01 RX ADMIN — AMLODIPINE BESYLATE 10 MG: 10 TABLET ORAL at 08:39

## 2023-01-01 RX ADMIN — ACETAMINOPHEN 650 MG: 325 TABLET ORAL at 01:06

## 2023-01-01 RX ADMIN — DICLOFENAC SODIUM 2 G: 10 GEL TOPICAL at 08:23

## 2023-01-01 RX ADMIN — POLYETHYLENE GLYCOL 3350 17 G: 17 POWDER, FOR SOLUTION ORAL at 08:26

## 2023-01-01 RX ADMIN — OXYCODONE AND ACETAMINOPHEN 1 TABLET: 5; 325 TABLET ORAL at 18:55

## 2023-01-01 RX ADMIN — CLONIDINE HYDROCHLORIDE 0.1 MG: 0.1 TABLET ORAL at 12:04

## 2023-01-01 RX ADMIN — METHYLPREDNISOLONE SODIUM SUCCINATE 40 MG: 40 INJECTION, POWDER, FOR SOLUTION INTRAMUSCULAR; INTRAVENOUS at 20:31

## 2023-01-01 RX ADMIN — PROPOFOL 20 MCG/KG/MIN: 10 INJECTION, EMULSION INTRAVENOUS at 22:08

## 2023-01-01 RX ADMIN — OXYCODONE AND ACETAMINOPHEN 1 TABLET: 5; 325 TABLET ORAL at 17:22

## 2023-01-01 RX ADMIN — Medication 4 ML: at 08:28

## 2023-01-01 RX ADMIN — DOCUSATE SODIUM 100 MG: 50 LIQUID ORAL at 10:35

## 2023-01-01 RX ADMIN — ACETAMINOPHEN 650 MG: 325 TABLET ORAL at 03:03

## 2023-01-01 RX ADMIN — LANSOPRAZOLE 30 MG: 30 TABLET, ORALLY DISINTEGRATING, DELAYED RELEASE ORAL at 06:17

## 2023-01-01 RX ADMIN — CLONIDINE HYDROCHLORIDE 0.1 MG: 0.1 TABLET ORAL at 04:10

## 2023-01-01 RX ADMIN — TIOTROPIUM BROMIDE AND OLODATEROL 4 PUFF: 3.124; 2.736 SPRAY, METERED RESPIRATORY (INHALATION) at 07:52

## 2023-01-01 RX ADMIN — POTASSIUM BICARBONATE 20 MEQ: 782 TABLET, EFFERVESCENT ORAL at 08:11

## 2023-01-01 RX ADMIN — INSULIN LISPRO 3 UNITS: 100 INJECTION, SOLUTION INTRAVENOUS; SUBCUTANEOUS at 12:49

## 2023-01-01 RX ADMIN — ASPIRIN 81 MG: 81 TABLET, CHEWABLE ORAL at 07:46

## 2023-01-01 RX ADMIN — TIOTROPIUM BROMIDE AND OLODATEROL 4 PUFF: 3.124; 2.736 SPRAY, METERED RESPIRATORY (INHALATION) at 08:19

## 2023-01-01 RX ADMIN — POTASSIUM CHLORIDE 10 MEQ: 7.46 INJECTION, SOLUTION INTRAVENOUS at 08:30

## 2023-01-01 RX ADMIN — TAMSULOSIN HYDROCHLORIDE 0.4 MG: 0.4 CAPSULE ORAL at 08:33

## 2023-01-01 RX ADMIN — HYDRALAZINE HYDROCHLORIDE 10 MG: 20 INJECTION INTRAMUSCULAR; INTRAVENOUS at 04:38

## 2023-01-01 RX ADMIN — AMLODIPINE BESYLATE 10 MG: 10 TABLET ORAL at 10:41

## 2023-01-01 RX ADMIN — FERROUS SULFATE TAB 325 MG (65 MG ELEMENTAL FE) 325 MG: 325 (65 FE) TAB at 07:53

## 2023-01-01 RX ADMIN — BARIUM SULFATE 40 ML: 0.81 POWDER, FOR SUSPENSION ORAL at 10:33

## 2023-01-01 RX ADMIN — HEPARIN SODIUM 21 UNITS/KG/HR: 10000 INJECTION, SOLUTION INTRAVENOUS at 22:16

## 2023-01-01 RX ADMIN — Medication 4 ML: at 09:38

## 2023-01-01 RX ADMIN — PRAVASTATIN SODIUM 10 MG: 10 TABLET ORAL at 19:28

## 2023-01-01 RX ADMIN — IPRATROPIUM BROMIDE AND ALBUTEROL SULFATE 1 DOSE: .5; 3 SOLUTION RESPIRATORY (INHALATION) at 20:30

## 2023-01-01 RX ADMIN — IPRATROPIUM BROMIDE AND ALBUTEROL SULFATE 1 DOSE: .5; 3 SOLUTION RESPIRATORY (INHALATION) at 11:41

## 2023-01-01 RX ADMIN — CLONIDINE HYDROCHLORIDE 0.1 MG: 0.1 TABLET ORAL at 19:02

## 2023-01-01 RX ADMIN — ACETYLCYSTEINE 600 MG: 200 SOLUTION ORAL; RESPIRATORY (INHALATION) at 15:23

## 2023-01-01 RX ADMIN — TIOTROPIUM BROMIDE AND OLODATEROL 4 PUFF: 3.124; 2.736 SPRAY, METERED RESPIRATORY (INHALATION) at 08:55

## 2023-01-01 RX ADMIN — MORPHINE SULFATE 4 MG: 4 INJECTION, SOLUTION INTRAMUSCULAR; INTRAVENOUS at 07:35

## 2023-01-01 RX ADMIN — PRAVASTATIN SODIUM 10 MG: 10 TABLET ORAL at 21:46

## 2023-01-01 RX ADMIN — LANSOPRAZOLE 30 MG: 30 TABLET, ORALLY DISINTEGRATING, DELAYED RELEASE ORAL at 04:38

## 2023-01-01 RX ADMIN — BUMETANIDE 1 MG: 0.25 INJECTION INTRAMUSCULAR; INTRAVENOUS at 12:49

## 2023-01-01 RX ADMIN — CEFTRIAXONE SODIUM 2000 MG: 2 INJECTION, POWDER, FOR SOLUTION INTRAMUSCULAR; INTRAVENOUS at 17:34

## 2023-01-01 RX ADMIN — PIPERACILLIN AND TAZOBACTAM 3375 MG: 3; .375 INJECTION, POWDER, LYOPHILIZED, FOR SOLUTION INTRAVENOUS at 20:05

## 2023-01-01 RX ADMIN — DEXTROSE MONOHYDRATE: 50 INJECTION, SOLUTION INTRAVENOUS at 15:56

## 2023-01-01 RX ADMIN — AMLODIPINE BESYLATE 10 MG: 10 TABLET ORAL at 09:15

## 2023-01-01 RX ADMIN — OLANZAPINE 7.5 MG: 5 TABLET, FILM COATED ORAL at 20:40

## 2023-01-01 RX ADMIN — PANTOPRAZOLE SODIUM 40 MG: 40 TABLET, DELAYED RELEASE ORAL at 05:40

## 2023-01-01 RX ADMIN — HEPARIN SODIUM 4000 UNITS: 1000 INJECTION INTRAVENOUS; SUBCUTANEOUS at 13:47

## 2023-01-01 RX ADMIN — QUETIAPINE FUMARATE 25 MG: 25 TABLET ORAL at 19:28

## 2023-01-01 RX ADMIN — DICLOFENAC SODIUM 2 G: 10 GEL TOPICAL at 08:48

## 2023-01-01 RX ADMIN — DOCUSATE SODIUM 100 MG: 50 LIQUID ORAL at 08:26

## 2023-01-01 RX ADMIN — OLANZAPINE 7.5 MG: 5 TABLET, FILM COATED ORAL at 20:28

## 2023-01-01 RX ADMIN — POLYETHYLENE GLYCOL 3350 17 G: 17 POWDER, FOR SOLUTION ORAL at 07:45

## 2023-01-01 RX ADMIN — BISACODYL 10 MG: 10 SUPPOSITORY RECTAL at 11:22

## 2023-01-01 RX ADMIN — IPRATROPIUM BROMIDE AND ALBUTEROL SULFATE 1 DOSE: .5; 3 SOLUTION RESPIRATORY (INHALATION) at 20:15

## 2023-01-01 RX ADMIN — Medication 25 MG: at 10:16

## 2023-01-01 RX ADMIN — PRAVASTATIN SODIUM 10 MG: 10 TABLET ORAL at 20:07

## 2023-01-01 RX ADMIN — Medication 25 MG: at 10:15

## 2023-01-01 RX ADMIN — FUROSEMIDE 20 MG: 10 INJECTION, SOLUTION INTRAMUSCULAR; INTRAVENOUS at 08:02

## 2023-01-01 RX ADMIN — CLONIDINE HYDROCHLORIDE 0.1 MG: 0.1 TABLET ORAL at 11:08

## 2023-01-01 RX ADMIN — PIPERACILLIN AND TAZOBACTAM 3375 MG: 3; .375 INJECTION, POWDER, LYOPHILIZED, FOR SOLUTION INTRAVENOUS at 05:45

## 2023-01-01 RX ADMIN — DOCUSATE SODIUM 50 MG AND SENNOSIDES 8.6 MG 1 TABLET: 8.6; 5 TABLET, FILM COATED ORAL at 22:25

## 2023-01-01 RX ADMIN — CLONIDINE HYDROCHLORIDE 0.1 MG: 0.1 TABLET ORAL at 19:13

## 2023-01-01 RX ADMIN — GABAPENTIN 300 MG: 300 CAPSULE ORAL at 20:04

## 2023-01-01 RX ADMIN — HYDRALAZINE HYDROCHLORIDE 10 MG: 20 INJECTION INTRAMUSCULAR; INTRAVENOUS at 17:32

## 2023-01-01 RX ADMIN — LANSOPRAZOLE 30 MG: 30 TABLET, ORALLY DISINTEGRATING, DELAYED RELEASE ORAL at 05:34

## 2023-01-01 RX ADMIN — HEPARIN SODIUM 21 UNITS/KG/HR: 10000 INJECTION, SOLUTION INTRAVENOUS at 09:01

## 2023-01-01 RX ADMIN — HYDROXYZINE PAMOATE 50 MG: 50 CAPSULE ORAL at 08:10

## 2023-01-01 RX ADMIN — ASPIRIN 81 MG: 81 TABLET, CHEWABLE ORAL at 07:45

## 2023-01-01 RX ADMIN — IPRATROPIUM BROMIDE AND ALBUTEROL SULFATE 1 DOSE: .5; 3 SOLUTION RESPIRATORY (INHALATION) at 08:48

## 2023-01-01 RX ADMIN — OXYCODONE AND ACETAMINOPHEN 1 TABLET: 5; 325 TABLET ORAL at 21:03

## 2023-01-01 RX ADMIN — PROPOFOL 20 MCG/KG/MIN: 10 INJECTION, EMULSION INTRAVENOUS at 03:54

## 2023-01-01 RX ADMIN — FUROSEMIDE 20 MG: 20 TABLET ORAL at 08:15

## 2023-01-01 RX ADMIN — CEFTRIAXONE SODIUM 2000 MG: 2 INJECTION, POWDER, FOR SOLUTION INTRAMUSCULAR; INTRAVENOUS at 17:57

## 2023-01-01 RX ADMIN — GABAPENTIN 300 MG: 300 CAPSULE ORAL at 09:13

## 2023-01-01 RX ADMIN — Medication 4 ML: at 07:58

## 2023-01-01 RX ADMIN — IPRATROPIUM BROMIDE AND ALBUTEROL SULFATE 1 DOSE: .5; 3 SOLUTION RESPIRATORY (INHALATION) at 08:30

## 2023-01-01 RX ADMIN — HEPARIN SODIUM 21 UNITS/KG/HR: 10000 INJECTION, SOLUTION INTRAVENOUS at 23:39

## 2023-01-01 RX ADMIN — TAMSULOSIN HYDROCHLORIDE 0.4 MG: 0.4 CAPSULE ORAL at 08:15

## 2023-01-01 RX ADMIN — TIOTROPIUM BROMIDE AND OLODATEROL 4 PUFF: 3.124; 2.736 SPRAY, METERED RESPIRATORY (INHALATION) at 08:26

## 2023-01-01 RX ADMIN — OXYCODONE AND ACETAMINOPHEN 1 TABLET: 5; 325 TABLET ORAL at 07:18

## 2023-01-01 RX ADMIN — PIPERACILLIN AND TAZOBACTAM 3375 MG: 3; .375 INJECTION, POWDER, LYOPHILIZED, FOR SOLUTION INTRAVENOUS at 05:32

## 2023-01-01 RX ADMIN — OXYCODONE AND ACETAMINOPHEN 1 TABLET: 5; 325 TABLET ORAL at 06:07

## 2023-01-01 RX ADMIN — FLUMAZENIL 0.4 MG: 0.1 INJECTION, SOLUTION INTRAVENOUS at 10:08

## 2023-01-01 RX ADMIN — PANTOPRAZOLE SODIUM 40 MG: 40 TABLET, DELAYED RELEASE ORAL at 06:15

## 2023-01-01 RX ADMIN — HEPARIN SODIUM 22 UNITS/KG/HR: 10000 INJECTION, SOLUTION INTRAVENOUS at 07:12

## 2023-01-01 RX ADMIN — ASPIRIN 81 MG: 81 TABLET, CHEWABLE ORAL at 07:53

## 2023-01-01 RX ADMIN — TIOTROPIUM BROMIDE AND OLODATEROL 4 PUFF: 3.124; 2.736 SPRAY, METERED RESPIRATORY (INHALATION) at 06:13

## 2023-01-01 RX ADMIN — LANSOPRAZOLE 30 MG: 30 TABLET, ORALLY DISINTEGRATING, DELAYED RELEASE ORAL at 05:42

## 2023-01-01 RX ADMIN — MORPHINE SULFATE 4 MG: 4 INJECTION, SOLUTION INTRAMUSCULAR; INTRAVENOUS at 15:13

## 2023-01-01 RX ADMIN — PROPOFOL 35 MCG/KG/MIN: 10 INJECTION, EMULSION INTRAVENOUS at 13:24

## 2023-01-01 RX ADMIN — INSULIN GLARGINE 4 UNITS: 100 INJECTION, SOLUTION SUBCUTANEOUS at 10:40

## 2023-01-01 RX ADMIN — AMLODIPINE BESYLATE 10 MG: 10 TABLET ORAL at 09:22

## 2023-01-01 RX ADMIN — INSULIN GLARGINE 4 UNITS: 100 INJECTION, SOLUTION SUBCUTANEOUS at 08:11

## 2023-01-01 RX ADMIN — INSULIN GLARGINE 4 UNITS: 100 INJECTION, SOLUTION SUBCUTANEOUS at 09:14

## 2023-01-01 RX ADMIN — HEPARIN SODIUM 18 UNITS/KG/HR: 10000 INJECTION, SOLUTION INTRAVENOUS at 11:35

## 2023-01-01 RX ADMIN — POTASSIUM BICARBONATE 40 MEQ: 782 TABLET, EFFERVESCENT ORAL at 06:32

## 2023-01-01 RX ADMIN — CALCIUM GLUCONATE 1000 MG: 10 INJECTION, SOLUTION INTRAVENOUS at 11:41

## 2023-01-01 RX ADMIN — GABAPENTIN 300 MG: 300 CAPSULE ORAL at 19:49

## 2023-01-01 RX ADMIN — PIPERACILLIN AND TAZOBACTAM 3375 MG: 3; .375 INJECTION, POWDER, LYOPHILIZED, FOR SOLUTION INTRAVENOUS at 17:09

## 2023-01-01 RX ADMIN — AMLODIPINE BESYLATE 10 MG: 10 TABLET ORAL at 07:46

## 2023-01-01 RX ADMIN — PRAVASTATIN SODIUM 10 MG: 10 TABLET ORAL at 22:25

## 2023-01-01 RX ADMIN — PRAVASTATIN SODIUM 10 MG: 10 TABLET ORAL at 21:05

## 2023-01-01 RX ADMIN — METOPROLOL SUCCINATE 50 MG: 50 TABLET, EXTENDED RELEASE ORAL at 09:15

## 2023-01-01 RX ADMIN — AMLODIPINE BESYLATE 10 MG: 10 TABLET ORAL at 08:23

## 2023-01-01 RX ADMIN — Medication 4 ML: at 07:52

## 2023-01-01 RX ADMIN — Medication 3 MG: at 20:08

## 2023-01-01 RX ADMIN — CLONIDINE HYDROCHLORIDE 0.1 MG: 0.1 TABLET ORAL at 20:07

## 2023-01-01 RX ADMIN — CEFTRIAXONE SODIUM 1000 MG: 1 INJECTION, POWDER, FOR SOLUTION INTRAMUSCULAR; INTRAVENOUS at 18:58

## 2023-01-01 RX ADMIN — Medication 0.2 MCG/KG/HR: at 14:29

## 2023-01-01 RX ADMIN — OXYCODONE AND ACETAMINOPHEN 0.5 TABLET: 5; 325 TABLET ORAL at 06:35

## 2023-01-01 RX ADMIN — ASPIRIN 81 MG: 81 TABLET, CHEWABLE ORAL at 08:10

## 2023-01-01 RX ADMIN — PROPOFOL 35 MCG/KG/MIN: 10 INJECTION, EMULSION INTRAVENOUS at 01:58

## 2023-01-01 RX ADMIN — XENON XE-133 5.4 MILLICURIE: 10 GAS RESPIRATORY (INHALATION) at 13:30

## 2023-01-01 RX ADMIN — CALCIUM GLUCONATE 2000 MG: 20 INJECTION, SOLUTION INTRAVENOUS at 00:56

## 2023-01-01 RX ADMIN — GABAPENTIN 300 MG: 300 CAPSULE ORAL at 19:28

## 2023-01-01 RX ADMIN — MORPHINE SULFATE 4 MG: 4 INJECTION, SOLUTION INTRAMUSCULAR; INTRAVENOUS at 16:00

## 2023-01-01 RX ADMIN — TAMSULOSIN HYDROCHLORIDE 0.4 MG: 0.4 CAPSULE ORAL at 09:15

## 2023-01-01 RX ADMIN — INSULIN LISPRO 1 UNITS: 100 INJECTION, SOLUTION INTRAVENOUS; SUBCUTANEOUS at 16:23

## 2023-01-01 RX ADMIN — CLONIDINE HYDROCHLORIDE 0.1 MG: 0.1 TABLET ORAL at 02:29

## 2023-01-01 RX ADMIN — ASPIRIN 81 MG: 81 TABLET, CHEWABLE ORAL at 09:14

## 2023-01-01 RX ADMIN — PROPOFOL 40 MCG/KG/MIN: 10 INJECTION, EMULSION INTRAVENOUS at 07:46

## 2023-01-01 RX ADMIN — FUROSEMIDE 20 MG: 10 INJECTION, SOLUTION INTRAMUSCULAR; INTRAVENOUS at 09:15

## 2023-01-01 RX ADMIN — PROPOFOL 30 MCG/KG/MIN: 10 INJECTION, EMULSION INTRAVENOUS at 01:42

## 2023-01-01 RX ADMIN — OXYCODONE AND ACETAMINOPHEN 1 TABLET: 5; 325 TABLET ORAL at 11:43

## 2023-01-01 RX ADMIN — POTASSIUM BICARBONATE 20 MEQ: 782 TABLET, EFFERVESCENT ORAL at 08:33

## 2023-01-01 RX ADMIN — QUETIAPINE FUMARATE 25 MG: 25 TABLET ORAL at 20:08

## 2023-01-01 RX ADMIN — POTASSIUM BICARBONATE 20 MEQ: 782 TABLET, EFFERVESCENT ORAL at 15:50

## 2023-01-01 RX ADMIN — HEPARIN SODIUM 21 UNITS/KG/HR: 10000 INJECTION, SOLUTION INTRAVENOUS at 13:16

## 2023-01-01 RX ADMIN — AMLODIPINE BESYLATE 10 MG: 10 TABLET ORAL at 08:20

## 2023-01-01 RX ADMIN — PRAVASTATIN SODIUM 10 MG: 10 TABLET ORAL at 20:23

## 2023-01-01 RX ADMIN — HEPARIN SODIUM 21 UNITS/KG/HR: 10000 INJECTION, SOLUTION INTRAVENOUS at 22:05

## 2023-01-01 RX ADMIN — DOCUSATE SODIUM 50 MG AND SENNOSIDES 8.6 MG 1 TABLET: 8.6; 5 TABLET, FILM COATED ORAL at 21:15

## 2023-01-01 RX ADMIN — CALCIUM GLUCONATE 1000 MG: 10 INJECTION, SOLUTION INTRAVENOUS at 02:30

## 2023-01-01 RX ADMIN — TAMSULOSIN HYDROCHLORIDE 0.4 MG: 0.4 CAPSULE ORAL at 09:22

## 2023-01-01 RX ADMIN — PRAVASTATIN SODIUM 10 MG: 10 TABLET ORAL at 19:33

## 2023-01-01 RX ADMIN — IPRATROPIUM BROMIDE AND ALBUTEROL SULFATE 1 DOSE: .5; 3 SOLUTION RESPIRATORY (INHALATION) at 16:00

## 2023-01-01 RX ADMIN — Medication 2 MILLICURIE: at 13:35

## 2023-01-01 RX ADMIN — INSULIN LISPRO 2 UNITS: 100 INJECTION, SOLUTION INTRAVENOUS; SUBCUTANEOUS at 10:30

## 2023-01-01 RX ADMIN — CEFTRIAXONE SODIUM 2000 MG: 2 INJECTION, POWDER, FOR SOLUTION INTRAMUSCULAR; INTRAVENOUS at 17:01

## 2023-01-01 RX ADMIN — IPRATROPIUM BROMIDE AND ALBUTEROL SULFATE 1 DOSE: .5; 3 SOLUTION RESPIRATORY (INHALATION) at 15:17

## 2023-01-01 RX ADMIN — TAMSULOSIN HYDROCHLORIDE 0.4 MG: 0.4 CAPSULE ORAL at 08:48

## 2023-01-01 RX ADMIN — PROPOFOL 40 MCG/KG/MIN: 10 INJECTION, EMULSION INTRAVENOUS at 03:25

## 2023-01-01 RX ADMIN — AMLODIPINE BESYLATE 10 MG: 10 TABLET ORAL at 08:14

## 2023-01-01 RX ADMIN — INSULIN GLARGINE 4 UNITS: 100 INJECTION, SOLUTION SUBCUTANEOUS at 08:22

## 2023-01-01 RX ADMIN — CLONIDINE HYDROCHLORIDE 0.1 MG: 0.1 TABLET ORAL at 07:46

## 2023-01-01 RX ADMIN — LANSOPRAZOLE 30 MG: 30 TABLET, ORALLY DISINTEGRATING, DELAYED RELEASE ORAL at 05:11

## 2023-01-01 RX ADMIN — FUROSEMIDE 20 MG: 10 INJECTION, SOLUTION INTRAMUSCULAR; INTRAVENOUS at 08:26

## 2023-01-01 RX ADMIN — GABAPENTIN 300 MG: 300 CAPSULE ORAL at 08:19

## 2023-01-01 RX ADMIN — QUETIAPINE FUMARATE 25 MG: 25 TABLET ORAL at 20:58

## 2023-01-01 RX ADMIN — Medication 4 ML: at 06:07

## 2023-01-01 RX ADMIN — MORPHINE SULFATE 4 MG: 4 INJECTION, SOLUTION INTRAMUSCULAR; INTRAVENOUS at 15:01

## 2023-01-01 RX ADMIN — TIOTROPIUM BROMIDE AND OLODATEROL 4 PUFF: 3.124; 2.736 SPRAY, METERED RESPIRATORY (INHALATION) at 07:49

## 2023-01-01 RX ADMIN — GABAPENTIN 300 MG: 300 CAPSULE ORAL at 08:04

## 2023-01-01 RX ADMIN — TAMSULOSIN HYDROCHLORIDE 0.4 MG: 0.4 CAPSULE ORAL at 07:39

## 2023-01-01 RX ADMIN — LANSOPRAZOLE 30 MG: 30 TABLET, ORALLY DISINTEGRATING, DELAYED RELEASE ORAL at 05:47

## 2023-01-01 RX ADMIN — CLONIDINE HYDROCHLORIDE 0.1 MG: 0.1 TABLET ORAL at 10:52

## 2023-01-01 RX ADMIN — HYDRALAZINE HYDROCHLORIDE 10 MG: 20 INJECTION INTRAMUSCULAR; INTRAVENOUS at 09:58

## 2023-01-01 RX ADMIN — SODIUM CHLORIDE: 9 INJECTION, SOLUTION INTRAVENOUS at 12:55

## 2023-01-01 RX ADMIN — INSULIN GLARGINE 4 UNITS: 100 INJECTION, SOLUTION SUBCUTANEOUS at 08:26

## 2023-01-01 RX ADMIN — PROPOFOL 15 MCG/KG/MIN: 10 INJECTION, EMULSION INTRAVENOUS at 22:51

## 2023-01-01 RX ADMIN — ISODIUM CHLORIDE 3 ML: 0.03 SOLUTION RESPIRATORY (INHALATION) at 22:23

## 2023-01-01 RX ADMIN — DOXYCYCLINE 100 MG: 100 INJECTION, POWDER, LYOPHILIZED, FOR SOLUTION INTRAVENOUS at 20:33

## 2023-01-01 RX ADMIN — TIOTROPIUM BROMIDE AND OLODATEROL 4 PUFF: 3.124; 2.736 SPRAY, METERED RESPIRATORY (INHALATION) at 07:42

## 2023-01-01 RX ADMIN — DEXTROSE MONOHYDRATE 250 ML: 100 INJECTION, SOLUTION INTRAVENOUS at 06:56

## 2023-01-01 RX ADMIN — TIOTROPIUM BROMIDE AND OLODATEROL 2 PUFF: 3.124; 2.736 SPRAY, METERED RESPIRATORY (INHALATION) at 09:25

## 2023-01-01 RX ADMIN — HEPARIN SODIUM 18 UNITS/KG/HR: 10000 INJECTION, SOLUTION INTRAVENOUS at 04:19

## 2023-01-01 RX ADMIN — PRAVASTATIN SODIUM 10 MG: 10 TABLET ORAL at 20:45

## 2023-01-01 RX ADMIN — LANSOPRAZOLE 30 MG: 30 TABLET, ORALLY DISINTEGRATING, DELAYED RELEASE ORAL at 08:17

## 2023-01-01 RX ADMIN — ASPIRIN 81 MG: 81 TABLET, CHEWABLE ORAL at 08:02

## 2023-01-01 RX ADMIN — TAMSULOSIN HYDROCHLORIDE 0.4 MG: 0.4 CAPSULE ORAL at 08:14

## 2023-01-01 RX ADMIN — ACETYLCYSTEINE 600 MG: 200 SOLUTION ORAL; RESPIRATORY (INHALATION) at 15:17

## 2023-01-01 RX ADMIN — FERROUS SULFATE TAB 325 MG (65 MG ELEMENTAL FE) 325 MG: 325 (65 FE) TAB at 08:15

## 2023-01-01 RX ADMIN — CLONIDINE HYDROCHLORIDE 0.1 MG: 0.1 TABLET ORAL at 13:52

## 2023-01-01 RX ADMIN — TAMSULOSIN HYDROCHLORIDE 0.4 MG: 0.4 CAPSULE ORAL at 08:22

## 2023-01-01 RX ADMIN — CLONIDINE HYDROCHLORIDE 0.1 MG: 0.1 TABLET ORAL at 11:43

## 2023-01-01 RX ADMIN — INSULIN LISPRO 1 UNITS: 100 INJECTION, SOLUTION INTRAVENOUS; SUBCUTANEOUS at 17:07

## 2023-01-01 RX ADMIN — AMLODIPINE BESYLATE 10 MG: 10 TABLET ORAL at 08:33

## 2023-01-01 RX ADMIN — PRAVASTATIN SODIUM 10 MG: 10 TABLET ORAL at 20:47

## 2023-01-01 RX ADMIN — AMLODIPINE BESYLATE 10 MG: 10 TABLET ORAL at 08:48

## 2023-01-01 RX ADMIN — HEPARIN SODIUM 12 UNITS/KG/HR: 10000 INJECTION, SOLUTION INTRAVENOUS at 13:49

## 2023-01-01 RX ADMIN — PREDNISONE 40 MG: 20 TABLET ORAL at 08:04

## 2023-01-01 RX ADMIN — OXYCODONE AND ACETAMINOPHEN 1 TABLET: 5; 325 TABLET ORAL at 20:34

## 2023-01-01 RX ADMIN — PANTOPRAZOLE SODIUM 40 MG: 40 TABLET, DELAYED RELEASE ORAL at 05:50

## 2023-01-01 RX ADMIN — POTASSIUM BICARBONATE 40 MEQ: 782 TABLET, EFFERVESCENT ORAL at 08:30

## 2023-01-01 RX ADMIN — ACETAMINOPHEN 650 MG: 325 TABLET ORAL at 12:36

## 2023-01-01 RX ADMIN — PRAVASTATIN SODIUM 10 MG: 10 TABLET ORAL at 20:28

## 2023-01-01 RX ADMIN — HEPARIN SODIUM 20 UNITS/KG/HR: 10000 INJECTION, SOLUTION INTRAVENOUS at 17:59

## 2023-01-01 RX ADMIN — Medication 4 ML: at 16:14

## 2023-01-01 RX ADMIN — POLYETHYLENE GLYCOL 3350 17 G: 17 POWDER, FOR SOLUTION ORAL at 07:46

## 2023-01-01 RX ADMIN — OXYCODONE AND ACETAMINOPHEN 1 TABLET: 5; 325 TABLET ORAL at 18:37

## 2023-01-01 RX ADMIN — ASPIRIN 81 MG: 81 TABLET, CHEWABLE ORAL at 08:35

## 2023-01-01 RX ADMIN — HEPARIN SODIUM 21 UNITS/KG/HR: 10000 INJECTION, SOLUTION INTRAVENOUS at 02:55

## 2023-01-01 RX ADMIN — FERROUS SULFATE TAB 325 MG (65 MG ELEMENTAL FE) 325 MG: 325 (65 FE) TAB at 18:55

## 2023-01-01 RX ADMIN — HYDROXYZINE PAMOATE 50 MG: 50 CAPSULE ORAL at 09:12

## 2023-01-01 RX ADMIN — INSULIN GLARGINE 4 UNITS: 100 INJECTION, SOLUTION SUBCUTANEOUS at 08:17

## 2023-01-01 RX ADMIN — DICLOFENAC SODIUM 2 G: 10 GEL TOPICAL at 19:27

## 2023-01-01 RX ADMIN — GABAPENTIN 300 MG: 300 CAPSULE ORAL at 07:39

## 2023-01-01 RX ADMIN — Medication 0.2 MCG/KG/HR: at 05:32

## 2023-01-01 RX ADMIN — GABAPENTIN 300 MG: 300 CAPSULE ORAL at 20:33

## 2023-01-01 RX ADMIN — LANSOPRAZOLE 30 MG: 30 TABLET, ORALLY DISINTEGRATING, DELAYED RELEASE ORAL at 05:13

## 2023-01-01 RX ADMIN — AMLODIPINE BESYLATE 10 MG: 10 TABLET ORAL at 08:15

## 2023-01-01 RX ADMIN — OLANZAPINE 7.5 MG: 5 TABLET, FILM COATED ORAL at 19:37

## 2023-01-01 RX ADMIN — PROPOFOL 50 MCG/KG/MIN: 10 INJECTION, EMULSION INTRAVENOUS at 19:32

## 2023-01-01 RX ADMIN — GABAPENTIN 300 MG: 300 CAPSULE ORAL at 07:46

## 2023-01-01 RX ADMIN — Medication 0.2 MCG/KG/HR: at 21:24

## 2023-01-01 RX ADMIN — PANTOPRAZOLE SODIUM 40 MG: 40 TABLET, DELAYED RELEASE ORAL at 04:59

## 2023-01-01 RX ADMIN — CLONIDINE HYDROCHLORIDE 0.1 MG: 0.1 TABLET ORAL at 11:53

## 2023-01-01 RX ADMIN — PROPOFOL 35 MCG/KG/MIN: 10 INJECTION, EMULSION INTRAVENOUS at 04:20

## 2023-01-01 RX ADMIN — PREDNISONE 40 MG: 20 TABLET ORAL at 07:53

## 2023-01-01 RX ADMIN — HEPARIN SODIUM 21 UNITS/KG/HR: 10000 INJECTION, SOLUTION INTRAVENOUS at 21:18

## 2023-01-01 RX ADMIN — HYDRALAZINE HYDROCHLORIDE 10 MG: 20 INJECTION INTRAMUSCULAR; INTRAVENOUS at 11:13

## 2023-01-01 RX ADMIN — IPRATROPIUM BROMIDE AND ALBUTEROL SULFATE 1 DOSE: .5; 3 SOLUTION RESPIRATORY (INHALATION) at 20:11

## 2023-01-01 RX ADMIN — Medication 0.2 MCG/KG/HR: at 11:21

## 2023-01-01 RX ADMIN — PANTOPRAZOLE SODIUM 40 MG: 40 TABLET, DELAYED RELEASE ORAL at 05:31

## 2023-01-01 RX ADMIN — PRAVASTATIN SODIUM 10 MG: 10 TABLET ORAL at 19:49

## 2023-01-01 RX ADMIN — TIOTROPIUM BROMIDE AND OLODATEROL 4 PUFF: 3.124; 2.736 SPRAY, METERED RESPIRATORY (INHALATION) at 07:57

## 2023-01-01 RX ADMIN — CEFEPIME 1000 MG: 1 INJECTION, POWDER, FOR SOLUTION INTRAMUSCULAR; INTRAVENOUS at 10:38

## 2023-01-01 RX ADMIN — ZIPRASIDONE MESYLATE 10 MG: 20 INJECTION, POWDER, LYOPHILIZED, FOR SOLUTION INTRAMUSCULAR at 11:08

## 2023-01-01 RX ADMIN — LORAZEPAM 4 MG: 2 INJECTION INTRAMUSCULAR; INTRAVENOUS at 02:38

## 2023-01-01 RX ADMIN — LANSOPRAZOLE 30 MG: 30 TABLET, ORALLY DISINTEGRATING, DELAYED RELEASE ORAL at 06:29

## 2023-01-01 RX ADMIN — GABAPENTIN 300 MG: 300 CAPSULE ORAL at 08:39

## 2023-01-01 RX ADMIN — INSULIN GLARGINE 4 UNITS: 100 INJECTION, SOLUTION SUBCUTANEOUS at 08:33

## 2023-01-01 RX ADMIN — CLONIDINE HYDROCHLORIDE 0.1 MG: 0.1 TABLET ORAL at 02:04

## 2023-01-01 RX ADMIN — Medication 4 ML: at 08:53

## 2023-01-01 RX ADMIN — ACETYLCYSTEINE 600 MG: 200 SOLUTION ORAL; RESPIRATORY (INHALATION) at 07:20

## 2023-01-01 RX ADMIN — AMLODIPINE BESYLATE 10 MG: 10 TABLET ORAL at 08:19

## 2023-01-01 RX ADMIN — DOXYCYCLINE 100 MG: 100 INJECTION, POWDER, LYOPHILIZED, FOR SOLUTION INTRAVENOUS at 04:55

## 2023-01-01 RX ADMIN — HYDRALAZINE HYDROCHLORIDE 10 MG: 20 INJECTION, SOLUTION INTRAMUSCULAR; INTRAVENOUS at 23:22

## 2023-01-01 RX ADMIN — PRAVASTATIN SODIUM 10 MG: 10 TABLET ORAL at 20:27

## 2023-01-01 RX ADMIN — GUAIFENESIN AND DEXTROMETHORPHAN 5 ML: 100; 10 SYRUP ORAL at 02:30

## 2023-01-01 RX ADMIN — TIOTROPIUM BROMIDE AND OLODATEROL 4 PUFF: 3.124; 2.736 SPRAY, METERED RESPIRATORY (INHALATION) at 07:51

## 2023-01-01 RX ADMIN — CLONIDINE HYDROCHLORIDE 0.1 MG: 0.1 TABLET ORAL at 04:16

## 2023-01-01 RX ADMIN — HEPARIN SODIUM 21 UNITS/KG/HR: 10000 INJECTION, SOLUTION INTRAVENOUS at 00:36

## 2023-01-01 RX ADMIN — Medication 5 MCG/MIN: at 16:37

## 2023-01-01 RX ADMIN — ACETAMINOPHEN 650 MG: 325 TABLET ORAL at 05:59

## 2023-01-01 RX ADMIN — INSULIN LISPRO 1 UNITS: 100 INJECTION, SOLUTION INTRAVENOUS; SUBCUTANEOUS at 08:15

## 2023-01-01 RX ADMIN — CEFTRIAXONE SODIUM 2000 MG: 2 INJECTION, POWDER, FOR SOLUTION INTRAMUSCULAR; INTRAVENOUS at 18:22

## 2023-01-01 RX ADMIN — GABAPENTIN 300 MG: 300 CAPSULE ORAL at 21:05

## 2023-01-01 RX ADMIN — PROPOFOL 20 MCG/KG/MIN: 10 INJECTION, EMULSION INTRAVENOUS at 06:34

## 2023-01-01 RX ADMIN — PROPOFOL 25 MCG/KG/MIN: 10 INJECTION, EMULSION INTRAVENOUS at 14:51

## 2023-01-01 RX ADMIN — GABAPENTIN 300 MG: 300 CAPSULE ORAL at 08:22

## 2023-01-01 RX ADMIN — Medication 4 ML: at 16:41

## 2023-01-01 RX ADMIN — POTASSIUM BICARBONATE 20 MEQ: 782 TABLET, EFFERVESCENT ORAL at 07:45

## 2023-01-01 RX ADMIN — ASPIRIN 81 MG: 81 TABLET, CHEWABLE ORAL at 09:15

## 2023-01-01 RX ADMIN — HEPARIN SODIUM 18 UNITS/KG/HR: 10000 INJECTION, SOLUTION INTRAVENOUS at 15:18

## 2023-01-01 RX ADMIN — CEFEPIME 1000 MG: 1 INJECTION, POWDER, FOR SOLUTION INTRAMUSCULAR; INTRAVENOUS at 00:23

## 2023-01-01 RX ADMIN — Medication 4 ML: at 18:26

## 2023-01-01 RX ADMIN — OLANZAPINE 7.5 MG: 5 TABLET, FILM COATED ORAL at 21:46

## 2023-01-01 RX ADMIN — PRAVASTATIN SODIUM 10 MG: 10 TABLET ORAL at 20:04

## 2023-01-01 RX ADMIN — Medication 0.2 MCG/KG/HR: at 21:21

## 2023-01-01 RX ADMIN — HYDROXYZINE PAMOATE 50 MG: 50 CAPSULE ORAL at 03:41

## 2023-01-01 RX ADMIN — CEFEPIME 1000 MG: 1 INJECTION, POWDER, FOR SOLUTION INTRAMUSCULAR; INTRAVENOUS at 22:48

## 2023-01-01 RX ADMIN — INSULIN GLARGINE 4 UNITS: 100 INJECTION, SOLUTION SUBCUTANEOUS at 08:48

## 2023-01-01 RX ADMIN — HYDRALAZINE HYDROCHLORIDE 10 MG: 20 INJECTION INTRAMUSCULAR; INTRAVENOUS at 19:04

## 2023-01-01 RX ADMIN — Medication 50 MG: at 10:11

## 2023-01-01 RX ADMIN — CLONIDINE HYDROCHLORIDE 0.1 MG: 0.1 TABLET ORAL at 21:05

## 2023-01-01 RX ADMIN — MOMETASONE FUROATE AND FORMOTEROL FUMARATE DIHYDRATE 2 PUFF: 200; 5 AEROSOL RESPIRATORY (INHALATION) at 01:41

## 2023-01-01 RX ADMIN — PROPOFOL 20 MCG/KG/MIN: 10 INJECTION, EMULSION INTRAVENOUS at 07:04

## 2023-01-01 RX ADMIN — GABAPENTIN 300 MG: 300 CAPSULE ORAL at 08:48

## 2023-01-01 RX ADMIN — PROPOFOL 35 MCG/KG/MIN: 10 INJECTION, EMULSION INTRAVENOUS at 21:54

## 2023-01-01 RX ADMIN — GABAPENTIN 300 MG: 300 CAPSULE ORAL at 19:37

## 2023-01-01 RX ADMIN — GABAPENTIN 300 MG: 300 CAPSULE ORAL at 08:14

## 2023-01-01 RX ADMIN — POLYETHYLENE GLYCOL 3350 17 G: 17 POWDER, FOR SOLUTION ORAL at 08:32

## 2023-01-01 RX ADMIN — POLYETHYLENE GLYCOL 3350 17 G: 17 POWDER, FOR SOLUTION ORAL at 19:27

## 2023-01-01 RX ADMIN — HEPARIN SODIUM 21 UNITS/KG/HR: 10000 INJECTION, SOLUTION INTRAVENOUS at 00:37

## 2023-01-01 RX ADMIN — METHYLPREDNISOLONE SODIUM SUCCINATE 40 MG: 40 INJECTION, POWDER, FOR SOLUTION INTRAMUSCULAR; INTRAVENOUS at 20:07

## 2023-01-01 RX ADMIN — CEFTRIAXONE SODIUM 1000 MG: 1 INJECTION, POWDER, FOR SOLUTION INTRAMUSCULAR; INTRAVENOUS at 18:25

## 2023-01-01 RX ADMIN — DOXYCYCLINE 100 MG: 100 INJECTION, POWDER, LYOPHILIZED, FOR SOLUTION INTRAVENOUS at 17:56

## 2023-01-01 RX ADMIN — IPRATROPIUM BROMIDE AND ALBUTEROL SULFATE 1 DOSE: .5; 3 SOLUTION RESPIRATORY (INHALATION) at 15:23

## 2023-01-01 RX ADMIN — ACETYLCYSTEINE 600 MG: 200 SOLUTION ORAL; RESPIRATORY (INHALATION) at 08:38

## 2023-01-01 RX ADMIN — DOCUSATE SODIUM 100 MG: 50 LIQUID ORAL at 09:14

## 2023-01-01 RX ADMIN — Medication 0.2 MCG/KG/HR: at 18:53

## 2023-01-01 RX ADMIN — HEPARIN SODIUM 21 UNITS/KG/HR: 10000 INJECTION, SOLUTION INTRAVENOUS at 01:36

## 2023-01-01 RX ADMIN — CEFEPIME 1000 MG: 1 INJECTION, POWDER, FOR SOLUTION INTRAMUSCULAR; INTRAVENOUS at 00:08

## 2023-01-01 RX ADMIN — CLONIDINE HYDROCHLORIDE 0.1 MG: 0.1 TABLET ORAL at 02:54

## 2023-01-01 RX ADMIN — METOPROLOL SUCCINATE 50 MG: 50 TABLET, EXTENDED RELEASE ORAL at 09:22

## 2023-01-01 RX ADMIN — HALOPERIDOL 10 MG: 5 INJECTION INTRAMUSCULAR at 02:29

## 2023-01-01 RX ADMIN — GABAPENTIN 300 MG: 300 CAPSULE ORAL at 20:40

## 2023-01-01 RX ADMIN — AMLODIPINE BESYLATE 10 MG: 10 TABLET ORAL at 07:53

## 2023-01-01 RX ADMIN — Medication 0.3 MCG/KG/HR: at 21:28

## 2023-01-01 RX ADMIN — POLYETHYLENE GLYCOL 3350 17 G: 17 POWDER, FOR SOLUTION ORAL at 08:10

## 2023-01-01 RX ADMIN — IPRATROPIUM BROMIDE AND ALBUTEROL SULFATE 1 DOSE: .5; 3 SOLUTION RESPIRATORY (INHALATION) at 11:40

## 2023-01-01 RX ADMIN — TIOTROPIUM BROMIDE AND OLODATEROL 4 PUFF: 3.124; 2.736 SPRAY, METERED RESPIRATORY (INHALATION) at 08:28

## 2023-01-01 RX ADMIN — ASPIRIN 81 MG: 81 TABLET, CHEWABLE ORAL at 08:26

## 2023-01-01 RX ADMIN — POTASSIUM BICARBONATE 20 MEQ: 782 TABLET, EFFERVESCENT ORAL at 10:44

## 2023-01-01 RX ADMIN — DOCUSATE SODIUM 50 MG AND SENNOSIDES 8.6 MG 1 TABLET: 8.6; 5 TABLET, FILM COATED ORAL at 19:28

## 2023-01-01 RX ADMIN — LORAZEPAM 4 MG: 2 INJECTION INTRAMUSCULAR; INTRAVENOUS at 15:25

## 2023-01-01 RX ADMIN — POLYETHYLENE GLYCOL 3350 17 G: 17 POWDER, FOR SOLUTION ORAL at 22:24

## 2023-01-01 RX ADMIN — METOPROLOL SUCCINATE 50 MG: 50 TABLET, EXTENDED RELEASE ORAL at 10:40

## 2023-01-01 RX ADMIN — POTASSIUM BICARBONATE 20 MEQ: 782 TABLET, EFFERVESCENT ORAL at 08:46

## 2023-01-01 RX ADMIN — CEFEPIME 1000 MG: 1 INJECTION, POWDER, FOR SOLUTION INTRAMUSCULAR; INTRAVENOUS at 11:00

## 2023-01-01 RX ADMIN — BUMETANIDE 1 MG: 0.25 INJECTION INTRAMUSCULAR; INTRAVENOUS at 04:17

## 2023-01-01 RX ADMIN — NITROGLYCERIN 0.4 MG: 0.4 TABLET, ORALLY DISINTEGRATING SUBLINGUAL at 22:13

## 2023-01-01 RX ADMIN — PROPOFOL 30 MCG/KG/MIN: 10 INJECTION, EMULSION INTRAVENOUS at 20:41

## 2023-01-01 RX ADMIN — POLYETHYLENE GLYCOL 3350 17 G: 17 POWDER, FOR SOLUTION ORAL at 08:33

## 2023-01-01 RX ADMIN — AMLODIPINE BESYLATE 10 MG: 10 TABLET ORAL at 10:06

## 2023-01-01 RX ADMIN — PROPOFOL 30 MCG/KG/MIN: 10 INJECTION, EMULSION INTRAVENOUS at 14:09

## 2023-01-01 RX ADMIN — FUROSEMIDE 40 MG: 10 INJECTION, SOLUTION INTRAMUSCULAR; INTRAVENOUS at 20:11

## 2023-01-01 RX ADMIN — GABAPENTIN 300 MG: 300 CAPSULE ORAL at 22:38

## 2023-01-01 RX ADMIN — PIPERACILLIN AND TAZOBACTAM 3375 MG: 3; .375 INJECTION, POWDER, LYOPHILIZED, FOR SOLUTION INTRAVENOUS at 06:40

## 2023-01-01 RX ADMIN — GABAPENTIN 300 MG: 300 CAPSULE ORAL at 20:28

## 2023-01-01 RX ADMIN — BUMETANIDE 1 MG: 0.25 INJECTION INTRAMUSCULAR; INTRAVENOUS at 13:44

## 2023-01-01 RX ADMIN — HEPARIN SODIUM 21 UNITS/KG/HR: 10000 INJECTION, SOLUTION INTRAVENOUS at 12:30

## 2023-01-01 RX ADMIN — GABAPENTIN 300 MG: 300 CAPSULE ORAL at 20:07

## 2023-01-01 RX ADMIN — CLONIDINE HYDROCHLORIDE 0.1 MG: 0.1 TABLET ORAL at 18:08

## 2023-01-01 RX ADMIN — PROPOFOL 35 MCG/KG/MIN: 10 INJECTION, EMULSION INTRAVENOUS at 01:10

## 2023-01-01 RX ADMIN — POTASSIUM CHLORIDE 10 MEQ: 7.46 INJECTION, SOLUTION INTRAVENOUS at 11:37

## 2023-01-01 RX ADMIN — INSULIN LISPRO 1 UNITS: 100 INJECTION, SOLUTION INTRAVENOUS; SUBCUTANEOUS at 17:04

## 2023-01-01 RX ADMIN — CLONIDINE HYDROCHLORIDE 0.1 MG: 0.1 TABLET ORAL at 20:28

## 2023-01-01 RX ADMIN — SODIUM CHLORIDE: 9 INJECTION, SOLUTION INTRAVENOUS at 10:14

## 2023-01-01 RX ADMIN — HALOPERIDOL LACTATE 10 MG: 5 INJECTION, SOLUTION INTRAMUSCULAR at 02:29

## 2023-01-01 RX ADMIN — BUMETANIDE 1 MG: 0.25 INJECTION INTRAMUSCULAR; INTRAVENOUS at 08:48

## 2023-01-01 RX ADMIN — DOXYCYCLINE 100 MG: 100 INJECTION, POWDER, LYOPHILIZED, FOR SOLUTION INTRAVENOUS at 06:23

## 2023-01-01 RX ADMIN — DOCUSATE SODIUM 50 MG AND SENNOSIDES 8.6 MG 1 TABLET: 8.6; 5 TABLET, FILM COATED ORAL at 08:33

## 2023-01-01 RX ADMIN — DOCUSATE SODIUM 50 MG AND SENNOSIDES 8.6 MG 1 TABLET: 8.6; 5 TABLET, FILM COATED ORAL at 08:20

## 2023-01-01 RX ADMIN — HEPARIN SODIUM 21 UNITS/KG/HR: 10000 INJECTION, SOLUTION INTRAVENOUS at 05:48

## 2023-01-01 RX ADMIN — POTASSIUM BICARBONATE 20 MEQ: 782 TABLET, EFFERVESCENT ORAL at 08:39

## 2023-01-01 RX ADMIN — GABAPENTIN 300 MG: 300 CAPSULE ORAL at 07:53

## 2023-01-01 RX ADMIN — HEPARIN SODIUM 20 UNITS/KG/HR: 10000 INJECTION, SOLUTION INTRAVENOUS at 06:23

## 2023-01-01 RX ADMIN — SODIUM CHLORIDE: 9 INJECTION, SOLUTION INTRAVENOUS at 13:13

## 2023-01-01 RX ADMIN — POLYETHYLENE GLYCOL 3350 17 G: 17 POWDER, FOR SOLUTION ORAL at 07:39

## 2023-01-01 RX ADMIN — POTASSIUM CHLORIDE 10 MEQ: 7.46 INJECTION, SOLUTION INTRAVENOUS at 10:32

## 2023-01-01 RX ADMIN — ASPIRIN 81 MG: 81 TABLET, CHEWABLE ORAL at 08:21

## 2023-01-01 RX ADMIN — FERROUS SULFATE TAB 325 MG (65 MG ELEMENTAL FE) 325 MG: 325 (65 FE) TAB at 17:10

## 2023-01-01 RX ADMIN — QUETIAPINE FUMARATE 25 MG: 25 TABLET ORAL at 22:24

## 2023-01-01 RX ADMIN — LANSOPRAZOLE 30 MG: 30 TABLET, ORALLY DISINTEGRATING, DELAYED RELEASE ORAL at 05:07

## 2023-01-01 RX ADMIN — DOCUSATE SODIUM 50 MG AND SENNOSIDES 8.6 MG 1 TABLET: 8.6; 5 TABLET, FILM COATED ORAL at 08:48

## 2023-01-01 RX ADMIN — CEFEPIME 1000 MG: 1 INJECTION, POWDER, FOR SOLUTION INTRAMUSCULAR; INTRAVENOUS at 10:13

## 2023-01-01 RX ADMIN — TIOTROPIUM BROMIDE AND OLODATEROL 4 PUFF: 3.124; 2.736 SPRAY, METERED RESPIRATORY (INHALATION) at 08:21

## 2023-01-01 RX ADMIN — DOCUSATE SODIUM 100 MG: 50 LIQUID ORAL at 08:11

## 2023-01-01 RX ADMIN — POLYETHYLENE GLYCOL 3350 17 G: 17 POWDER, FOR SOLUTION ORAL at 21:15

## 2023-01-01 RX ADMIN — OXYCODONE AND ACETAMINOPHEN 1 TABLET: 5; 325 TABLET ORAL at 07:47

## 2023-01-01 RX ADMIN — HYDRALAZINE HYDROCHLORIDE 10 MG: 20 INJECTION INTRAMUSCULAR; INTRAVENOUS at 03:09

## 2023-01-01 RX ADMIN — INSULIN GLARGINE 4 UNITS: 100 INJECTION, SOLUTION SUBCUTANEOUS at 08:10

## 2023-01-01 RX ADMIN — HEPARIN SODIUM 21 UNITS/KG/HR: 10000 INJECTION, SOLUTION INTRAVENOUS at 17:07

## 2023-01-01 RX ADMIN — MIDAZOLAM 2 MG: 1 INJECTION INTRAMUSCULAR; INTRAVENOUS at 10:19

## 2023-01-01 RX ADMIN — CALCIUM GLUCONATE 1000 MG: 10 INJECTION, SOLUTION INTRAVENOUS at 06:11

## 2023-01-01 RX ADMIN — OXYCODONE AND ACETAMINOPHEN 1 TABLET: 5; 325 TABLET ORAL at 06:51

## 2023-01-01 RX ADMIN — BUMETANIDE 1 MG: 0.25 INJECTION INTRAMUSCULAR; INTRAVENOUS at 10:04

## 2023-01-01 RX ADMIN — CEFEPIME 2000 MG: 2 INJECTION, POWDER, FOR SOLUTION INTRAVENOUS at 12:16

## 2023-01-01 RX ADMIN — HEPARIN SODIUM 21 UNITS/KG/HR: 10000 INJECTION, SOLUTION INTRAVENOUS at 20:31

## 2023-01-01 RX ADMIN — TAMSULOSIN HYDROCHLORIDE 0.4 MG: 0.4 CAPSULE ORAL at 07:46

## 2023-01-01 RX ADMIN — Medication 3 MG: at 03:04

## 2023-01-01 RX ADMIN — ASPIRIN 81 MG: 81 TABLET, CHEWABLE ORAL at 08:18

## 2023-01-01 RX ADMIN — GABAPENTIN 300 MG: 300 CAPSULE ORAL at 08:10

## 2023-01-01 RX ADMIN — POLYETHYLENE GLYCOL 3350 17 G: 17 POWDER, FOR SOLUTION ORAL at 09:14

## 2023-01-01 RX ADMIN — POLYETHYLENE GLYCOL 3350 17 G: 17 POWDER, FOR SOLUTION ORAL at 07:53

## 2023-01-01 RX ADMIN — HYDROXYZINE PAMOATE 50 MG: 50 CAPSULE ORAL at 20:46

## 2023-01-01 RX ADMIN — GABAPENTIN 600 MG: 300 CAPSULE ORAL at 18:55

## 2023-01-01 RX ADMIN — QUETIAPINE FUMARATE 25 MG: 25 TABLET ORAL at 21:16

## 2023-01-01 RX ADMIN — PROPOFOL 30 MCG/KG/MIN: 10 INJECTION, EMULSION INTRAVENOUS at 06:28

## 2023-01-01 RX ADMIN — CEFEPIME 1000 MG: 1 INJECTION, POWDER, FOR SOLUTION INTRAMUSCULAR; INTRAVENOUS at 22:41

## 2023-01-01 RX ADMIN — GABAPENTIN 300 MG: 300 CAPSULE ORAL at 09:15

## 2023-01-01 RX ADMIN — Medication 4 ML: at 08:43

## 2023-01-01 RX ADMIN — FERROUS SULFATE TAB 325 MG (65 MG ELEMENTAL FE) 325 MG: 325 (65 FE) TAB at 09:15

## 2023-01-01 RX ADMIN — DOCUSATE SODIUM 100 MG: 50 LIQUID ORAL at 08:09

## 2023-01-01 RX ADMIN — POLYETHYLENE GLYCOL 3350 17 G: 17 POWDER, FOR SOLUTION ORAL at 08:47

## 2023-01-01 RX ADMIN — HEPARIN SODIUM 21 UNITS/KG/HR: 10000 INJECTION, SOLUTION INTRAVENOUS at 14:19

## 2023-01-01 RX ADMIN — ASPIRIN 81 MG: 81 TABLET, CHEWABLE ORAL at 07:39

## 2023-01-01 RX ADMIN — GABAPENTIN 300 MG: 300 CAPSULE ORAL at 20:58

## 2023-01-01 RX ADMIN — GABAPENTIN 300 MG: 300 CAPSULE ORAL at 19:33

## 2023-01-01 RX ADMIN — LANSOPRAZOLE 30 MG: 30 TABLET, ORALLY DISINTEGRATING, DELAYED RELEASE ORAL at 05:00

## 2023-01-01 RX ADMIN — POTASSIUM CHLORIDE 10 MEQ: 7.46 INJECTION, SOLUTION INTRAVENOUS at 09:26

## 2023-01-01 RX ADMIN — INSULIN GLARGINE 4 UNITS: 100 INJECTION, SOLUTION SUBCUTANEOUS at 07:45

## 2023-01-01 RX ADMIN — IPRATROPIUM BROMIDE AND ALBUTEROL SULFATE 1 DOSE: .5; 3 SOLUTION RESPIRATORY (INHALATION) at 15:49

## 2023-01-01 RX ADMIN — PRAVASTATIN SODIUM 10 MG: 10 TABLET ORAL at 19:37

## 2023-01-01 RX ADMIN — FUROSEMIDE 20 MG: 10 INJECTION, SOLUTION INTRAMUSCULAR; INTRAVENOUS at 08:10

## 2023-01-01 RX ADMIN — Medication 0.2 MCG/KG/HR: at 10:42

## 2023-01-01 RX ADMIN — AMLODIPINE BESYLATE 10 MG: 10 TABLET ORAL at 08:02

## 2023-01-01 RX ADMIN — PRAVASTATIN SODIUM 10 MG: 10 TABLET ORAL at 20:40

## 2023-01-01 RX ADMIN — HEPARIN SODIUM 21 UNITS/KG/HR: 10000 INJECTION, SOLUTION INTRAVENOUS at 15:52

## 2023-01-01 RX ADMIN — ACETYLCYSTEINE 600 MG: 200 SOLUTION ORAL; RESPIRATORY (INHALATION) at 20:15

## 2023-01-01 RX ADMIN — ACETAMINOPHEN 650 MG: 325 TABLET ORAL at 08:33

## 2023-01-01 RX ADMIN — FERROUS SULFATE TAB 325 MG (65 MG ELEMENTAL FE) 325 MG: 325 (65 FE) TAB at 09:14

## 2023-01-01 RX ADMIN — HEPARIN SODIUM 21 UNITS/KG/HR: 10000 INJECTION, SOLUTION INTRAVENOUS at 10:43

## 2023-01-01 RX ADMIN — PIPERACILLIN AND TAZOBACTAM 3375 MG: 3; .375 INJECTION, POWDER, LYOPHILIZED, FOR SOLUTION INTRAVENOUS at 22:20

## 2023-01-01 RX ADMIN — AMLODIPINE BESYLATE 10 MG: 10 TABLET ORAL at 07:39

## 2023-01-01 RX ADMIN — ACETAMINOPHEN 650 MG: 325 TABLET ORAL at 19:28

## 2023-01-01 RX ADMIN — ENOXAPARIN SODIUM 30 MG: 100 INJECTION SUBCUTANEOUS at 08:15

## 2023-01-01 RX ADMIN — GUAIFENESIN AND DEXTROMETHORPHAN 5 ML: 100; 10 SYRUP ORAL at 20:07

## 2023-01-01 RX ADMIN — Medication 4 ML: at 16:59

## 2023-01-01 RX ADMIN — DICLOFENAC SODIUM 2 G: 10 GEL TOPICAL at 20:07

## 2023-01-01 RX ADMIN — GABAPENTIN 300 MG: 300 CAPSULE ORAL at 08:20

## 2023-01-01 RX ADMIN — PIPERACILLIN AND TAZOBACTAM 3375 MG: 3; .375 INJECTION, POWDER, LYOPHILIZED, FOR SOLUTION INTRAVENOUS at 14:44

## 2023-01-01 RX ADMIN — PRAVASTATIN SODIUM 10 MG: 10 TABLET ORAL at 20:08

## 2023-01-01 RX ADMIN — PRAVASTATIN SODIUM 10 MG: 10 TABLET ORAL at 21:16

## 2023-01-01 RX ADMIN — CLONIDINE HYDROCHLORIDE 0.1 MG: 0.1 TABLET ORAL at 20:27

## 2023-01-01 RX ADMIN — ACETAMINOPHEN 650 MG: 325 TABLET ORAL at 12:41

## 2023-01-01 RX ADMIN — DOCUSATE SODIUM 100 MG: 50 LIQUID ORAL at 07:45

## 2023-01-01 RX ADMIN — HYDRALAZINE HYDROCHLORIDE 10 MG: 20 INJECTION INTRAMUSCULAR; INTRAVENOUS at 22:02

## 2023-01-01 RX ADMIN — PROPOFOL 40 MCG/KG/MIN: 10 INJECTION, EMULSION INTRAVENOUS at 00:09

## 2023-01-01 RX ADMIN — TAMSULOSIN HYDROCHLORIDE 0.4 MG: 0.4 CAPSULE ORAL at 08:12

## 2023-01-01 RX ADMIN — HEPARIN SODIUM 18 UNITS/KG/HR: 10000 INJECTION, SOLUTION INTRAVENOUS at 21:56

## 2023-01-01 RX ADMIN — INSULIN GLARGINE 4 UNITS: 100 INJECTION, SOLUTION SUBCUTANEOUS at 07:46

## 2023-01-01 RX ADMIN — PIPERACILLIN AND TAZOBACTAM 3375 MG: 3; .375 INJECTION, POWDER, LYOPHILIZED, FOR SOLUTION INTRAVENOUS at 07:34

## 2023-01-01 RX ADMIN — POTASSIUM BICARBONATE 40 MEQ: 782 TABLET, EFFERVESCENT ORAL at 05:41

## 2023-01-01 RX ADMIN — TAMSULOSIN HYDROCHLORIDE 0.4 MG: 0.4 CAPSULE ORAL at 08:02

## 2023-01-01 RX ADMIN — OXYCODONE AND ACETAMINOPHEN 0.5 TABLET: 5; 325 TABLET ORAL at 22:25

## 2023-01-01 RX ADMIN — OXYCODONE AND ACETAMINOPHEN 1 TABLET: 5; 325 TABLET ORAL at 17:29

## 2023-01-01 RX ADMIN — PROPOFOL 60 MCG/KG/MIN: 10 INJECTION, EMULSION INTRAVENOUS at 16:38

## 2023-01-01 RX ADMIN — AMLODIPINE BESYLATE 10 MG: 10 TABLET ORAL at 08:17

## 2023-01-01 RX ADMIN — TIOTROPIUM BROMIDE AND OLODATEROL 4 PUFF: 3.124; 2.736 SPRAY, METERED RESPIRATORY (INHALATION) at 08:36

## 2023-01-01 RX ADMIN — ALBUTEROL SULFATE 2.5 MG: 2.5 SOLUTION RESPIRATORY (INHALATION) at 01:31

## 2023-01-01 RX ADMIN — Medication 4 ML: at 07:51

## 2023-01-01 RX ADMIN — ACETYLCYSTEINE 600 MG: 200 SOLUTION ORAL; RESPIRATORY (INHALATION) at 20:30

## 2023-01-01 RX ADMIN — GABAPENTIN 300 MG: 300 CAPSULE ORAL at 20:27

## 2023-01-01 RX ADMIN — FERROUS SULFATE TAB 325 MG (65 MG ELEMENTAL FE) 325 MG: 325 (65 FE) TAB at 08:04

## 2023-01-01 RX ADMIN — DEXAMETHASONE SODIUM PHOSPHATE 10 MG: 4 INJECTION, SOLUTION INTRA-ARTICULAR; INTRALESIONAL; INTRAMUSCULAR; INTRAVENOUS; SOFT TISSUE at 01:20

## 2023-01-01 RX ADMIN — POLYETHYLENE GLYCOL 3350 17 G: 17 POWDER, FOR SOLUTION ORAL at 09:15

## 2023-01-01 RX ADMIN — INSULIN LISPRO 2 UNITS: 100 INJECTION, SOLUTION INTRAVENOUS; SUBCUTANEOUS at 12:25

## 2023-01-01 RX ADMIN — IPRATROPIUM BROMIDE AND ALBUTEROL SULFATE 1 DOSE: .5; 3 SOLUTION RESPIRATORY (INHALATION) at 07:20

## 2023-01-01 RX ADMIN — ASPIRIN 81 MG: 81 TABLET, CHEWABLE ORAL at 08:03

## 2023-01-01 RX ADMIN — DICLOFENAC SODIUM 2 G: 10 GEL TOPICAL at 21:00

## 2023-01-01 RX ADMIN — POTASSIUM BICARBONATE 20 MEQ: 782 TABLET, EFFERVESCENT ORAL at 08:09

## 2023-01-01 RX ADMIN — GABAPENTIN 600 MG: 300 CAPSULE ORAL at 08:15

## 2023-01-01 RX ADMIN — HEPARIN SODIUM 16 UNITS/KG/HR: 10000 INJECTION, SOLUTION INTRAVENOUS at 09:43

## 2023-01-01 RX ADMIN — INSULIN GLARGINE 4 UNITS: 100 INJECTION, SOLUTION SUBCUTANEOUS at 08:34

## 2023-01-01 RX ADMIN — DOCUSATE SODIUM 100 MG: 50 LIQUID ORAL at 15:27

## 2023-01-01 RX ADMIN — HYDRALAZINE HYDROCHLORIDE 10 MG: 20 INJECTION INTRAMUSCULAR; INTRAVENOUS at 20:08

## 2023-01-01 RX ADMIN — POLYETHYLENE GLYCOL 3350 17 G: 17 POWDER, FOR SOLUTION ORAL at 09:12

## 2023-01-01 RX ADMIN — FUROSEMIDE 20 MG: 10 INJECTION, SOLUTION INTRAMUSCULAR; INTRAVENOUS at 18:45

## 2023-01-01 RX ADMIN — DOCUSATE SODIUM 50 MG AND SENNOSIDES 8.6 MG 1 TABLET: 8.6; 5 TABLET, FILM COATED ORAL at 20:08

## 2023-01-01 RX ADMIN — PRAVASTATIN SODIUM 10 MG: 10 TABLET ORAL at 20:09

## 2023-01-01 RX ADMIN — TAMSULOSIN HYDROCHLORIDE 0.4 MG: 0.4 CAPSULE ORAL at 07:53

## 2023-01-01 RX ADMIN — ALBUTEROL SULFATE 2.5 MG: 2.5 SOLUTION RESPIRATORY (INHALATION) at 17:50

## 2023-01-01 RX ADMIN — CLONIDINE HYDROCHLORIDE 0.1 MG: 0.1 TABLET ORAL at 01:17

## 2023-01-01 RX ADMIN — OXYCODONE AND ACETAMINOPHEN 1 TABLET: 5; 325 TABLET ORAL at 06:28

## 2023-01-01 RX ADMIN — CLONIDINE HYDROCHLORIDE 0.1 MG: 0.1 TABLET ORAL at 22:25

## 2023-01-01 RX ADMIN — PIPERACILLIN AND TAZOBACTAM 4500 MG: 4; .5 INJECTION, POWDER, FOR SOLUTION INTRAVENOUS at 16:57

## 2023-01-01 RX ADMIN — AMLODIPINE BESYLATE 10 MG: 10 TABLET ORAL at 08:10

## 2023-01-01 RX ADMIN — METHYLPREDNISOLONE SODIUM SUCCINATE 40 MG: 40 INJECTION, POWDER, FOR SOLUTION INTRAMUSCULAR; INTRAVENOUS at 20:09

## 2023-01-01 RX ADMIN — PRAVASTATIN SODIUM 10 MG: 10 TABLET ORAL at 20:58

## 2023-01-01 RX ADMIN — GABAPENTIN 300 MG: 300 CAPSULE ORAL at 08:03

## 2023-01-01 RX ADMIN — POLYETHYLENE GLYCOL 3350 17 G: 17 POWDER, FOR SOLUTION ORAL at 08:02

## 2023-01-01 RX ADMIN — INSULIN LISPRO 1 UNITS: 100 INJECTION, SOLUTION INTRAVENOUS; SUBCUTANEOUS at 16:59

## 2023-01-01 RX ADMIN — HYDRALAZINE HYDROCHLORIDE 10 MG: 20 INJECTION INTRAMUSCULAR; INTRAVENOUS at 17:42

## 2023-01-01 RX ADMIN — OXYCODONE AND ACETAMINOPHEN 1 TABLET: 5; 325 TABLET ORAL at 20:23

## 2023-01-01 RX ADMIN — CLONIDINE HYDROCHLORIDE 0.1 MG: 0.1 TABLET ORAL at 03:16

## 2023-01-01 RX ADMIN — TIOTROPIUM BROMIDE AND OLODATEROL 4 PUFF: 3.124; 2.736 SPRAY, METERED RESPIRATORY (INHALATION) at 08:59

## 2023-01-01 RX ADMIN — ASPIRIN 81 MG: 81 TABLET, CHEWABLE ORAL at 08:20

## 2023-01-01 RX ADMIN — PROPOFOL 35 MCG/KG/MIN: 10 INJECTION, EMULSION INTRAVENOUS at 17:35

## 2023-01-01 RX ADMIN — Medication 4 ML: at 06:12

## 2023-01-01 RX ADMIN — INSULIN HUMAN 10 UNITS: 100 INJECTION, SOLUTION PARENTERAL at 06:56

## 2023-01-01 ASSESSMENT — PAIN SCALES - GENERAL
PAINLEVEL_OUTOF10: 0
PAINLEVEL_OUTOF10: 3
PAINLEVEL_OUTOF10: 0
PAINLEVEL_OUTOF10: 3
PAINLEVEL_OUTOF10: 0
PAINLEVEL_OUTOF10: 10
PAINLEVEL_OUTOF10: 10
PAINLEVEL_OUTOF10: 0
PAINLEVEL_OUTOF10: 9
PAINLEVEL_OUTOF10: 4
PAINLEVEL_OUTOF10: 0
PAINLEVEL_OUTOF10: 0
PAINLEVEL_OUTOF10: 5
PAINLEVEL_OUTOF10: 0
PAINLEVEL_OUTOF10: 8
PAINLEVEL_OUTOF10: 1
PAINLEVEL_OUTOF10: 7
PAINLEVEL_OUTOF10: 0
PAINLEVEL_OUTOF10: 0
PAINLEVEL_OUTOF10: 3
PAINLEVEL_OUTOF10: 0
PAINLEVEL_OUTOF10: 8
PAINLEVEL_OUTOF10: 7
PAINLEVEL_OUTOF10: 0
PAINLEVEL_OUTOF10: 3
PAINLEVEL_OUTOF10: 0
PAINLEVEL_OUTOF10: 4
PAINLEVEL_OUTOF10: 0
PAINLEVEL_OUTOF10: 0
PAINLEVEL_OUTOF10: 4
PAINLEVEL_OUTOF10: 0
PAINLEVEL_OUTOF10: 4
PAINLEVEL_OUTOF10: 7
PAINLEVEL_OUTOF10: 0
PAINLEVEL_OUTOF10: 1
PAINLEVEL_OUTOF10: 0
PAINLEVEL_OUTOF10: 10
PAINLEVEL_OUTOF10: 6
PAINLEVEL_OUTOF10: 7
PAINLEVEL_OUTOF10: 0
PAINLEVEL_OUTOF10: 7
PAINLEVEL_OUTOF10: 0
PAINLEVEL_OUTOF10: 0
PAINLEVEL_OUTOF10: 7
PAINLEVEL_OUTOF10: 7
PAINLEVEL_OUTOF10: 3
PAINLEVEL_OUTOF10: 7
PAINLEVEL_OUTOF10: 6
PAINLEVEL_OUTOF10: 0
PAINLEVEL_OUTOF10: 2
PAINLEVEL_OUTOF10: 0
PAINLEVEL_OUTOF10: 0
PAINLEVEL_OUTOF10: 7
PAINLEVEL_OUTOF10: 7
PAINLEVEL_OUTOF10: 0
PAINLEVEL_OUTOF10: 2
PAINLEVEL_OUTOF10: 0
PAINLEVEL_OUTOF10: 0
PAINLEVEL_OUTOF10: 8
PAINLEVEL_OUTOF10: 6
PAINLEVEL_OUTOF10: 0
PAINLEVEL_OUTOF10: 6
PAINLEVEL_OUTOF10: 0
PAINLEVEL_OUTOF10: 3
PAINLEVEL_OUTOF10: 0
PAINLEVEL_OUTOF10: 7
PAINLEVEL_OUTOF10: 0

## 2023-01-01 ASSESSMENT — PULMONARY FUNCTION TESTS
PIF_VALUE: 19
PIF_VALUE: 23
PIF_VALUE: 25
PIF_VALUE: 33
PIF_VALUE: 30
PIF_VALUE: 21
PIF_VALUE: 21
PIF_VALUE: 29
PIF_VALUE: 23
PIF_VALUE: 31
PIF_VALUE: 24
PIF_VALUE: 24
PIF_VALUE: 25
PIF_VALUE: 26
PIF_VALUE: 22
PIF_VALUE: 29
PIF_VALUE: 21
PIF_VALUE: 23
PIF_VALUE: 25
PIF_VALUE: 20
PIF_VALUE: 31
PIF_VALUE: 20
PIF_VALUE: 23
PIF_VALUE: 23
PIF_VALUE: 22
PIF_VALUE: 19
PIF_VALUE: 23
PIF_VALUE: 22
PIF_VALUE: 28
PIF_VALUE: 24
PIF_VALUE: 19
PIF_VALUE: 23
PIF_VALUE: 20
PIF_VALUE: 25
PIF_VALUE: 15
PIF_VALUE: 27
PIF_VALUE: 28
PIF_VALUE: 21
PIF_VALUE: 26
PIF_VALUE: 31
PIF_VALUE: 24
PIF_VALUE: 31
PIF_VALUE: 29
PIF_VALUE: 24
PIF_VALUE: 21
PIF_VALUE: 32
PIF_VALUE: 29
PIF_VALUE: 30
PIF_VALUE: 19
PIF_VALUE: 31
PIF_VALUE: 20
PIF_VALUE: 24

## 2023-01-01 ASSESSMENT — PAIN SCALES - WONG BAKER
WONGBAKER_NUMERICALRESPONSE: 0
WONGBAKER_NUMERICALRESPONSE: 8
WONGBAKER_NUMERICALRESPONSE: 0

## 2023-01-01 ASSESSMENT — PAIN DESCRIPTION - DESCRIPTORS
DESCRIPTORS: ACHING;DISCOMFORT
DESCRIPTORS: ACHING;SHARP;SHOOTING
DESCRIPTORS: ACHING
DESCRIPTORS: DISCOMFORT
DESCRIPTORS: ACHING;SHARP;SHOOTING
DESCRIPTORS: PRESSURE
DESCRIPTORS: DISCOMFORT;ACHING
DESCRIPTORS: SORE
DESCRIPTORS: PATIENT UNABLE TO DESCRIBE
DESCRIPTORS: ACHING
DESCRIPTORS: PATIENT UNABLE TO DESCRIBE
DESCRIPTORS: ACHING
DESCRIPTORS: NAGGING
DESCRIPTORS: ACHING;TENDER
DESCRIPTORS: ACHING;SHARP;SHOOTING
DESCRIPTORS: ACHING;SHOOTING;SHARP
DESCRIPTORS: ACHING
DESCRIPTORS: SHARP

## 2023-01-01 ASSESSMENT — PAIN DESCRIPTION - ORIENTATION
ORIENTATION: RIGHT
ORIENTATION: MID
ORIENTATION: RIGHT
ORIENTATION: MID;LOWER
ORIENTATION: RIGHT
ORIENTATION: RIGHT;LOWER
ORIENTATION: RIGHT
ORIENTATION: RIGHT
ORIENTATION: RIGHT;LOWER
ORIENTATION: MID
ORIENTATION: RIGHT;LOWER
ORIENTATION: RIGHT;LOWER
ORIENTATION: RIGHT
ORIENTATION: RIGHT
ORIENTATION: MID
ORIENTATION: RIGHT

## 2023-01-01 ASSESSMENT — PAIN DESCRIPTION - FREQUENCY
FREQUENCY: CONTINUOUS
FREQUENCY: INTERMITTENT
FREQUENCY: CONTINUOUS

## 2023-01-01 ASSESSMENT — PAIN - FUNCTIONAL ASSESSMENT

## 2023-01-01 ASSESSMENT — PAIN DESCRIPTION - PAIN TYPE
TYPE: ACUTE PAIN;CHRONIC PAIN
TYPE: ACUTE PAIN
TYPE: ACUTE PAIN
TYPE: ACUTE PAIN;SURGICAL PAIN
TYPE: CHRONIC PAIN
TYPE: ACUTE PAIN;SURGICAL PAIN
TYPE: ACUTE PAIN;SURGICAL PAIN

## 2023-01-01 ASSESSMENT — PAIN DESCRIPTION - LOCATION
LOCATION: HIP
LOCATION: BACK;HIP
LOCATION: BACK;HIP
LOCATION: HIP
LOCATION: GENERALIZED
LOCATION: CHEST
LOCATION: HIP
LOCATION: CHEST
LOCATION: ABDOMEN;CHEST
LOCATION: GENERALIZED
LOCATION: HIP
LOCATION: HIP
LOCATION: BACK
LOCATION: HIP;BACK
LOCATION: BACK;HIP
LOCATION: HIP
LOCATION: BACK
LOCATION: HIP
LOCATION: GENERALIZED
LOCATION: HIP

## 2023-01-01 ASSESSMENT — PAIN DESCRIPTION - ONSET
ONSET: ON-GOING
ONSET: ON-GOING
ONSET: SUDDEN
ONSET: ON-GOING
ONSET: ON-GOING
ONSET: GRADUAL
ONSET: ON-GOING

## 2023-01-01 ASSESSMENT — PAIN SCALES - PAIN ASSESSMENT IN ADVANCED DEMENTIA (PAINAD)
BODYLANGUAGE: 2
FACIALEXPRESSION: 1
BREATHING: 1
CONSOLABILITY: 1
TOTALSCORE: 6
NEGVOCALIZATION: 1

## 2023-12-08 PROBLEM — J96.01 ACUTE RESPIRATORY FAILURE WITH HYPOXIA (HCC): Status: ACTIVE | Noted: 2023-01-01

## 2023-12-08 PROBLEM — J96.01 ACUTE HYPOXIC RESPIRATORY FAILURE (HCC): Status: ACTIVE | Noted: 2023-01-01

## 2023-12-08 NOTE — FLOWSHEET NOTE
12/08/23 0098   Safe Environment   Safety Measures Bed in low position;Call light within reach;Nurse at bedside;Standard Safety Measures       Call placed to patients room, patient responds to audio, permitted video. Patient alert and oriented. Patient denied any voiced concerns/complaints at this time. Patient educated on utilizing call light. Call light within reach, no signs or symtpoms of distress noted.

## 2023-12-10 NOTE — CONSULTS
[facility]          [address]        [address]        [place], [place] [id number] [phone]           Inpatient Initial consult note         12/10/2023 11:02 AM      Patient Name:   Neftali Hazel  YOB: 1935  Primary Care Physician:  Brenda Prajapati MD   Admit Date:    12/8/2023  1:46 PM    Consultation requested by : Wm Alonso MD    Reason for Consult : Nephrology following for acute kidney injury.    History of presenting illness :Neftali Hazel is a 88 y.o.   male with Past Medical History as stated below presented with a chief complaint of No chief complaint on file.   on 12/8/2023 .    Patient presented to [facility] as a direct transfer from [facility] apparently he recently went revision of his right hip had an antibiotic spacer placed after prosthetic joint infection and presented to [facility] for revision on 11/27/2023.  The patient was hypoxic requiring high flow nasal cannula unable to wean so he was transferred to [facility] patient has received some diuretics and the respiratory status has improved.  Patient's creatinine has been consistently getting worse along with the respiratory status so nephrology has been consulted for further evaluation and management    Patient apparently was known to have CKD stage unknown, used to see nephrologist in [place] and he recently moved to the area    Past History:  Past Medical History:   Diagnosis Date    A-fib ([facility])     Bladder cancer ([facility])     CAD (coronary artery disease), native coronary artery     Cataracts, bilateral     CHF (congestive heart failure) ([facility])     LVEF 45% on 9/28/2023    CKD (chronic kidney disease) stage 3, GFR 30-59 ml/min ([facility])     Closed fracture of right femur ([facility])     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

## 2023-12-10 NOTE — FLOWSHEET NOTE
12/10/23 1036   Safe Environment   Safety Measures Bed in low position;Call light within reach;Side rails X 2  (Virtual Nurse Safety Round Complete)     Call placed to patients room, patient responds to audio, permitted video. Patient alert and oriented. Patient denied any voiced concerns/complaints at this time. Patient educated on utilizing call light. Call light within reach, no signs or symtpoms of distress noted.

## 2023-12-10 NOTE — FLOWSHEET NOTE
12/10/23 1306   Safe Environment   Safety Measures Bed in low position;Call light within reach;Side rails X 2;Visitors at bedside     Call placed to patient's room, patient did not respond to audio, video turned on for safety purposes. Patient is resting in bed, call light within reach, no signs or symtpoms of distress noted.

## 2023-12-11 PROBLEM — J90 PLEURAL EFFUSION, BILATERAL: Status: ACTIVE | Noted: 2023-01-01

## 2023-12-11 PROBLEM — J84.9 ILD (INTERSTITIAL LUNG DISEASE) (HCC): Status: ACTIVE | Noted: 2023-01-01

## 2023-12-11 PROBLEM — I50.22 CHRONIC SYSTOLIC CHF (CONGESTIVE HEART FAILURE) (HCC): Status: ACTIVE | Noted: 2023-01-01

## 2023-12-11 NOTE — FLOWSHEET NOTE
12/11/23 1058   Safe Environment   Safety Measures Call light within reach;Side rails X 2;Standard Safety Measures  (virtual nurse safety round complete)     VN called into patients room and introduced myself and role. Pt did not respond to voice, camera turned on for safety. Pt resting in bed with eyes closed. Chest visibly rising and falling. No apparent signs of distress.

## 2023-12-11 NOTE — FLOWSHEET NOTE
12/11/23 1330   Safe Environment   Safety Measures Other (comment)  (virtual nurse safety round complete)     VN called into patients room and introduced myself and role. Pt did not respond to voice, camera turned on for safety. Pt not visualized in room, pt is in nuclear med according to chart    @14:36 2 calls made out to praveena Lindsay regarding home med reconciliation, no response

## 2023-12-11 NOTE — CONSULTS
CONSULTATION NOTE :ID       Patient - Neftali Hazel,  Age - 88 y.o.    - 1935      Room Number - 4K-06/006-A   MRN -  [id number]   [facility] # - [id number]  Date of Admission -  2023  1:46 PM  Patient's PCP: Brenda Prajapati MD     Requesting Physician: Wm Alonso MD    REASON FOR CONSULTATION   Recent hip surgery with MRSA infection:   CHIEF COMPLAINT   Shortness of breath    HISTORY OF PRESENT ILLNESS       This is a very pleasant 88 y.o. male who was admitted to the hospital with a chief complaints of worsening shortness of breath. He has lung fibrosis. Had a recent right hip arthroplasty after he completed 6 wks of iv vancomycin. He had his second stage revision and has been on iv vancomycin for two wks. He was transferred here due to worsening shortness of breath. He has no fever or chills, noted to have blood clot on the lower legs, he is high flow oxygen.his medical record was reviewed.   I contacted Dr Taylor who is his Infectious disease. He advised me to go not continue vancomycin as he has almost completed the planned antibiotics and a dose of vancomycin would last him for a few days.  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

## 2023-12-11 NOTE — CONSULTS
[facility]      Patient - Neftali Hazel   MRN -  [id number]   [facility] # - [id number]   - 1935      Date of Admission -  2023  1:46 PM  Date of evaluation -  2023  Room - -006-A   Hospital Day - 3  Consulting - Wm Alonso MD Primary Care Physician - Brenda Prajapati MD     Problem List      Active Hospital Problems    Diagnosis Date Noted    Acute hypoxic respiratory failure (HCC) [J96.01] 2023    Acute respiratory failure with hypoxia (HCC) [J96.01] 2023     Reason for Consult    Hypoxic respiratory failure, on HFNC  HPI   History Obtained From: Patient and electronic medical record.    Neftali Hazel is a 88 y.o. male with PMH as below who presented to [facility] as a direct transfer from [facility] on 23. Pt states that he went to [facility] for a revision of his right hip. He had an abx spacer placed after prosthetic joint infection and presented to  for revision arthroplasty on 23. He experienced postoperative hypoxia necessitating HFNC and was unable to wean appropriately and was therefore transferred to [facility]. He arrived on HFNC with FiO2 60% at 40L. He was initially able to be titrated down to FiO2 40% at 20L, however experienced sudden onset respiratory distress in the late afternoon with increased supplemental O2 requirements at FiO2 80% at 20L, because he could not tolerate higher pressures. Pt's respiratory status improved s/p Furosemide 40mg IV and Percocet. Pt understands that he is at high risk for intubation should he experience similar increased WOB. Pt and son are agreeable to intubation if needed. Pt has named his son as his proxy decision maker should he be unable to make decisions for himself. Pt endorses ongoing dyspnea, but denies headache, vision changes, fever/chills, chest pain/pressure, palpitations, abdominal pain, constipation, diarrhea, dysuria, polyuria, skin changes, or worsening LE edema.     Pulmonology

## 2023-12-12 PROBLEM — E87.5 HYPERKALEMIA: Status: ACTIVE | Noted: 2023-01-01

## 2023-12-12 PROBLEM — N19 RENAL FAILURE SYNDROME: Status: ACTIVE | Noted: 2023-01-01

## 2023-12-12 PROBLEM — I10 ESSENTIAL HYPERTENSION: Status: ACTIVE | Noted: 2023-01-01

## 2023-12-12 NOTE — PALLIATIVE CARE
Initial Evaluation        Patient:   Neftali Hazel  YOB: 1935  Age:  88 y.o.  Room:  [address] [place] [address]  MRN:  [id number]   Acct: [id number]    Date of Admission:  12/8/2023  1:46 PM  Date of Service:  12/12/2023  Completed By:  Petty Galindo RN                 Reason for Palliative Care Evaluation:-               [] Code Status Discussion              [x] Goals of Care              [] Pain/Symptom Management               [] Emotional Support              [] Other:                            Advance Directives:-  none on file  [] [place] DNR Form  [] Living Will  [] Medical POA              Current Code Status:-     [x] Full Resuscitation  [] DNR-Comfort Care-Arrest  [] DNR-Comfort Care       [] Limited Resuscitation             [] No CPR            [] No shock            [] No ET intubation/reintubation            [] No resuscitative medications            [] Other limitation:                    Goals of care evaluation:-          Goals of care discussed with:  [] Patient independently  [] Patient and Family  [] Family or Healthcare DPOA independently  [x] Unable to discuss with patient, family/DPOA not present      History:-    Patient with history of CHF, CAD, CKD and Afib.  Patient with septic hip and s/p antibiotic spacer and revision hip arthroplasty.  Patient was transferred from [facility] [place] to [facility] due to respiratory failure.  Paitent with questionable lung fibrosis/ARDS.        Family/Patient Discussion:-    Patient has been moved to the ICU/intubated prior to this RN arriving to 4K.        Plan/Follow-Up:-    Palliative care will follow along peripherally and be available if needs arise.  Staff may call prn.           Electronically signed by Petty Galindo RN on 12/12/2023 at 1:12 PM           Palliative Care Office: [phone]

## 2023-12-12 NOTE — H&P
Patient:  Neftali Hazel    Unit/Bed:4D-13/013-A  MRN: [id number]   PCP: Brenda Prajapati MD  Date of Admission: 12/8/2023    Assessment and Plan(All pulmonary edema, renal failure, PE, and respiratory failure diagnoses are acute in nature unless otherwise specified):        Acute on chronic respiratory failure with hypoxemia: Likely multifactorial: Imaging consistent with multifocal pneumonia, pulmonary vascular congestion, COPD, interstitial lung disease.  Patient was started on steroids, ceftriaxone, doxycycline.  Patient was intubated on arrival to ICU on 12/12  Continue DuoNeb  Continue Stiolto  Continue prednisone 40 mg for 5 days: Current day 4.  Continue propofol gtt  Continue Precedex gtt  Acute on chronic HFmrEF, systolic dysfunction: Patient has been receiving intermittent diuretics at outside facility and in [facility].  Echo at OSH shows Monique the EF at 45%.  11/28 BNP 6529 at OSH.  CXR on admission demonstrated cardiomegaly, prominent interstitial and ill-defined airspace disease suspicious for edema versus infection in probable bilateral pleural effusion.  Patient received furosemide 40 mg IV on admission.  Furosemide held secondary to RAMANDEEP on CKD stage III  Nephrology consulted for assistance with diuresis given CKD stage III  Daily weights  Strict 's and O's  Hospital Acquired Pneumonia: Respiratory cultures at OSH grew gram-negative bacilli.  Pro-Rommel 0.3 at OSH, 0.25 on admission to [facility].  Patient was started on ceftriaxone and doxycycline on 12/10.  Pneumonia panel 12/8 detected E. coli.    Respiratory culture 12/8 grew E. coli.  Increased Rocephin to 2 g on 12/12  Discontinue doxycycline based on culture sensitivity  Acute DVT: Venous Doppler positive for thrombus in bilateral peroneal and left anterior tibial veins.  Continue heparin gtt  Plan to transition to Eliquis prior to discharge  RAMANDEEP on CKD stage III: Multifactorial from ischemic ATN from hypoxia, vancomycin use, diuretic use.  Associated 
suspicious for edema v infection and probable bilateral pleural effusion; diffuse crackles on exam, no pitting bilateral lower extremity edema.  Daily weights  Strict I/Os  Diuresis: furosemide 40mg IV given, will restart home furosemide tomorrow AM.  Continue to monitor for changes in volume status  CAD  CKD3, not oliguric. Cr 3.1 on admit. Baseline Cr likely 2.7-3.0, however no labs available prior to recent hospitalization at [facility].   Consider consult to nephrology.   US Renal  Urine studies  Continue to monitor with daily BMP.  Strict I/Os.  Gentle IVFs when indicated.  Avoid nephrotoxic agents.  Renally dose medications when indicated.  Paroxysmal Atrial Fibrillation with RVR. On Eliquis outpatient. Currently in afib. [id number] Score 2. HAS-BLED Score 4.  Continue AC with therapeutic lovenox, transition to home eliquis when appropriate.  Continue metoprolol  Continue telemetry  Infection of prosthetic hip joint, s/p antibiotic spacer and revision hip arthroplasty.   Continue to monitor for s/s of infection.  HTN.  Continue amlodipine, furosemide, and metoprolol    =======================================================================      Chief Complaint:  acute hypoxic respiratory failure    History Of Present Illness:  Neftali Hazel is a 88 y.o. male with PMHx of atrial fibrillation, COPD with emphysema, CAD s/p stenting, HFmrEF, hip fracture complicated by prosthetic joint infection, HTN, HLD, neuropathy, PUD, and GERD who presents to [facility] as a direct transfer from [facility]. Son is in room on arrival and assists with history. Patient states that he went in for a revision of his right hip. He had an antibiotic spacer placed after prosthetic joint infection and presented to [facility] for revision arthroplasty on 11/27/2023. He experienced postoperative hypoxia necessitating HHFNC and was unable to wean appropriately and was therefore transferred to [facility]. He

## 2023-12-12 NOTE — PROCEDURES
ICU PROCEDURE - ENDOTRACHEAL INTUBATION    Neftali Hazel     MRN#: [id number]  23      Acct #:[unfilled]     : 1935      INDICATION: Acute hypoxic respiratory failure    TIME OUT: taken    Permission obtained, risks/benefits reviewed:    ANESTHESIA:   [x]Ketamine  []Ativan  [] Morphine  [x]Propofol  [x]Other medications: Versed      ESTIMATED BLOOD LOSS:  None.    COMPLICATIONS:  [x]N/A  [] Other:    LARYNGOSCOPIC AIRWAY GRADE (CORMACK-LEHANE):[]1  [x]2a  []2b []3  []4        INTUBATION EQUIPMENT USED:  [x] Video laryngoscope only    OUTCOME: Successful placement of #  7.5  Taperguard Evac endotracheal via   [x]Oral route    INSERTION DEPTH:  24    cm from   [x]lip           CONFIRMATION OF TUBE POSITION:   [x]Capnography - Strong & repeatable exhaled CO2 detection   [x]Multiple point auscultation   [x]SpO2 response   [x]STAT X-ray   []Bronchoscopic assessment    UNUSUAL FINDINGS: None    PROCEDURE:     Using video laryngoscopy, the vocal cords were visualized and the endotracheal tube was placed through the cords under direct vision.     Good breath sounds were auscultated bilaterally without sounds over abdomen.  Appropriate strong & repeatable exhaled CO2 detection was confirmed.       Electronically signed by Barney Douglas MD on 2023 at 10:23 AM     Dr. Rees was present and available for assistance as needed.

## 2023-12-13 NOTE — PALLIATIVE CARE
Follow Up / Progress Note        Patient:   Neftali Hazel  YOB: 1935  Age:  88 y.o.  Room:  [facility]-  MRN:  [id number]         Family/Patient Discussion:  Primary RN, Keny currently in the room updating family.  This RN did not disturb.      Plan/Follow-Up:  Palliative care will continue to follow and staff may call prn.         Electronically signed by Petty Galindo RN on 12/13/2023 at 1:25 PM             Palliative Care Office: [phone]    Patient had a positive home pregnancy test.  LMP 10/17/18 EDC 19, making patient 6w.  She has irregular periods.  She went off Depo in May and only had two periods.  Quant order placed, if over 2,000 ultrasound will be ordered for dates.    Patient is taking a PNV.  She is not a smoker  And will limit caffeine to less than 300mg a day.  She has 2 cats and will have someone else change the litter.  Patient is to cook red meats to 160 degrees and no raw eggs, fresh lunch meat is ok but bologna and hotdogs need to be cooked.  Tylenol can be taken for aches and pains, no Ibuprofen.  New pregnancy booklet sent.

## 2023-12-14 NOTE — PALLIATIVE CARE
Follow Up / Progress Note        Patient:   Neftali Hazel  YOB: 1935  Age:  88 y.o.  Room:  [facility]13/[facility]  MRN:  [id number]         Family/Patient Discussion:  Patient remains intubated.  No family at the bedside.      Plan/Follow-Up:  Palliative care will continue to follow and staff may call prn if needs arise.         Electronically signed by Petty Galindo RN on 12/14/2023 at 1:53 PM             Palliative Care Office: [phone]

## 2023-12-16 PROBLEM — N17.0 ATN (ACUTE TUBULAR NECROSIS) (HCC): Status: ACTIVE | Noted: 2023-01-01

## 2023-12-16 PROBLEM — E87.6 HYPOKALEMIA: Status: ACTIVE | Noted: 2023-01-01

## 2023-12-16 PROBLEM — N17.9 AKI (ACUTE KIDNEY INJURY) (HCC): Status: ACTIVE | Noted: 2023-01-01

## 2023-12-24 NOTE — CONSENT
Informed Consent for Blood Component Transfusion Note    I have discussed with the patient the rationale for blood component transfusion; its benefits in treating or preventing fatigue, organ damage, or death; and its risk which includes mild transfusion reactions, rare risk of blood borne infection, or more serious but rare reactions. I have discussed the alternatives to transfusion, including the risk and consequences of not receiving transfusion. The patient had an opportunity to ask questions and had agreed to proceed with transfusion of blood components.    Electronically signed by Irvin Kim DO on 12/24/23 at 12:43 AM EST

## 2023-12-24 NOTE — CONSULTS
Orthopedic Consult    Requesting Physician: Adrienne Olvera MD    CHIEF COMPLAINT:  Right thigh swelling    HISTORY OF PRESENT ILLNESS:      The patient is a 88 y.o. male  who presents with postoperative hypoxia, bilateral lower extremity DVTs, swelling right thigh.  Patient is status post revision of right total hip arthroplasty stage I with antibiotic spacer.  This was done at an outside facility and patient ultimately transferred here secondary to medical complications.  Upon evaluation here CT scan demonstrated what appeared to be a hematoma/abscess/seroma formation throughout the right thigh as well as noted to have acute blood loss anemia that was managed with transfusion of multiple units.  Patient has bilateral DVTs in the lower extremities and we were consulted for consideration of restarting heparin.  Concern with restarting the heparin was possibly perpetuating the hematoma/fluid collections in the right thigh.  No known trauma or other events that could have precipitated the formation of this fluid collection in the right thigh.  We were asked to evaluate.    Past Medical History:    Past Medical History:   Diagnosis Date    A-fib (HCC)     Bladder cancer (HCC)     CAD (coronary artery disease), native coronary artery     Cataracts, bilateral     CHF (congestive heart failure) ([facility])     LVEF 45% on 9/28/2023    CKD (chronic kidney disease) stage 3, GFR 30-59 ml/min (HCC)     Closed fracture of right femur (HCC)     COPD with emphysema (HCC)     Diabetes type 2, controlled (HCC)     GERD (gastroesophageal reflux disease)     H/O total hip arthroplasty, right     History of total knee arthroplasty, bilateral     HTN (hypertension)     Hyperlipidemia     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

## 2023-12-26 NOTE — FLOWSHEET NOTE
1100 Mr Yasemin son has come to see and  has reassured his father that this is where he needs to be to get better. He was given 10 mg Geodon IM to help with his agitation per Dr Kim.     1230 He appears much more calm but more lethargic as well. He has complaints of pain to his right hip which is swollen. Dr Kim was informed of this as well and we will get a repeat CT scan of his hip while he is calm.

## 2023-12-26 NOTE — FLOWSHEET NOTE
0800  Mr Hazel rests in the bed. He appears very agitated this AM. He demands water and his phone. He remains NPO per speech and he appears cognitively unable to use his cell phone. He has complaints of pain in his right leg. He has an NGT with tube feeding at 15 cc/hr. He has good bowel sounds. He has a heparin gtt infusing through a peripheral IV.

## 2023-12-27 PROBLEM — R80.0 ISOLATED NON-NEPHROTIC PROTEINURIA: Status: ACTIVE | Noted: 2023-01-01

## 2023-12-27 NOTE — PALLIATIVE CARE
Follow Up / Progress Note        Patient:   Neftali Hazel  YOB: 1935  Age:  88 y.o.  Room:  [facility]-  MRN:  [id number]           Plan/Follow-Up:  Palliative care remains available if needs arise and staff may call prn.         Electronically signed by Petty Galindo RN on 12/27/2023 at 11:37 AM             Palliative Care Office: [phone]

## 2023-12-28 PROBLEM — I50.23 ACUTE ON CHRONIC HFREF (HEART FAILURE WITH REDUCED EJECTION FRACTION) (HCC): Status: ACTIVE | Noted: 2023-01-01

## 2023-12-30 NOTE — RT PROTOCOL NOTE
RT Inhaler-Nebulizer Bronchodilator Protocol Note    There is a bronchodilator order in the chart from a provider indicating to follow the RT Bronchodilator Protocol and there is an “Initiate RT Inhaler-Nebulizer Bronchodilator Protocol” order as well (see protocol at bottom of note).    CXR Findings:  No results found.    The findings from the last RT Protocol Assessment were as follows:   History Pulmonary Disease: Chronic pulmonary disease  Respiratory Pattern: Dyspnea on exertion or RR 21-25 bpm  Breath Sounds: Slightly diminished and/or crackles  Cough: Strong, spontaneous, non-productive  Indication for Bronchodilator Therapy: Decreased or absent breath sounds, On home bronchodilators  Bronchodilator Assessment Score: 6    Aerosolized bronchodilator medication orders have been revised according to the RT Inhaler-Nebulizer Bronchodilator Protocol below.    Respiratory Therapist to perform RT Therapy Protocol Assessment initially then follow the protocol.  Repeat RT Therapy Protocol Assessment PRN for score 0-3 or on second treatment, BID, and PRN for scores above 3.    No Indications - adjust the frequency to every 6 hours PRN wheezing or bronchospasm, if no treatments needed after 48 hours then discontinue using Per Protocol order mode.     If indication present, adjust the RT bronchodilator orders based on the Bronchodilator Assessment Score as indicated below.  Use Inhaler orders unless patient has one or more of the following: on home nebulizer, not able to hold breath for 10 seconds, is not alert and oriented, cannot activate and use MDI correctly, or respiratory rate 25 breaths per minute or more, then use the equivalent nebulizer order(s) with same Frequency and PRN reasons based on the score.  If a patient is on this medication at home then do not decrease Frequency below that used at home.    0-3 - enter or revise RT bronchodilator order(s) to equivalent RT Bronchodilator order with Frequency of every

## 2024-01-01 ENCOUNTER — HOSPITAL ENCOUNTER (INPATIENT)
Age: 89
LOS: 1 days | DRG: 951 | End: 2024-01-03
Attending: STUDENT IN AN ORGANIZED HEALTH CARE EDUCATION/TRAINING PROGRAM | Admitting: STUDENT IN AN ORGANIZED HEALTH CARE EDUCATION/TRAINING PROGRAM
Payer: MEDICARE

## 2024-01-01 ENCOUNTER — APPOINTMENT (OUTPATIENT)
Dept: CT IMAGING | Age: 89
DRG: 207 | End: 2024-01-01
Attending: INTERNAL MEDICINE
Payer: MEDICARE

## 2024-01-01 VITALS
OXYGEN SATURATION: 42 % | DIASTOLIC BLOOD PRESSURE: 49 MMHG | SYSTOLIC BLOOD PRESSURE: 103 MMHG | TEMPERATURE: 98.1 F | RESPIRATION RATE: 46 BRPM | HEART RATE: 84 BPM

## 2024-01-01 VITALS
OXYGEN SATURATION: 100 % | HEIGHT: 68 IN | DIASTOLIC BLOOD PRESSURE: 65 MMHG | BODY MASS INDEX: 27.87 KG/M2 | RESPIRATION RATE: 22 BRPM | WEIGHT: 183.86 LBS | HEART RATE: 78 BPM | TEMPERATURE: 99.4 F | SYSTOLIC BLOOD PRESSURE: 145 MMHG

## 2024-01-01 LAB
ABO: NORMAL
ANION GAP SERPL CALC-SCNC: 8 MEQ/L (ref 8–16)
ANTIBODY SCREEN: NORMAL
BUN SERPL-MCNC: 89 MG/DL (ref 7–22)
CALCIUM SERPL-MCNC: 8 MG/DL (ref 8.5–10.5)
CHLORIDE SERPL-SCNC: 101 MEQ/L (ref 98–111)
CO2 SERPL-SCNC: 30 MEQ/L (ref 23–33)
CREAT SERPL-MCNC: 3.6 MG/DL (ref 0.4–1.2)
DEPRECATED RDW RBC AUTO: 51.9 FL (ref 35–45)
EKG ATRIAL RATE: 94 BPM
EKG P AXIS: 44 DEGREES
EKG P-R INTERVAL: 150 MS
EKG Q-T INTERVAL: 372 MS
EKG QRS DURATION: 100 MS
EKG QTC CALCULATION (BAZETT): 465 MS
EKG R AXIS: 47 DEGREES
EKG T AXIS: 82 DEGREES
EKG VENTRICULAR RATE: 94 BPM
ERYTHROCYTE [DISTWIDTH] IN BLOOD BY AUTOMATED COUNT: 15.7 % (ref 11.5–14.5)
GFR SERPL CREATININE-BSD FRML MDRD: 15 ML/MIN/1.73M2
GLUCOSE BLD STRIP.AUTO-MCNC: 135 MG/DL (ref 70–108)
GLUCOSE SERPL-MCNC: 118 MG/DL (ref 70–108)
HCT VFR BLD AUTO: 21.1 % (ref 42–52)
HEPARIN UNFRACTIONATED: 0.43 U/ML (ref 0.3–0.7)
HGB BLD-MCNC: 6.6 GM/DL (ref 14–18)
MAGNESIUM SERPL-MCNC: 2.7 MG/DL (ref 1.6–2.4)
MCH RBC QN AUTO: 29.3 PG (ref 26–33)
MCHC RBC AUTO-ENTMCNC: 31.3 GM/DL (ref 32.2–35.5)
MCV RBC AUTO: 93.8 FL (ref 80–94)
PATHOLOGIST REVIEW: ABNORMAL
PHOSPHATE SERPL-MCNC: 3.5 MG/DL (ref 2.4–4.7)
PLATELET # BLD AUTO: 383 THOU/MM3 (ref 130–400)
PMV BLD AUTO: 10.1 FL (ref 9.4–12.4)
POTASSIUM SERPL-SCNC: 3.7 MEQ/L (ref 3.5–5.2)
RBC # BLD AUTO: 2.25 MILL/MM3 (ref 4.7–6.1)
RH FACTOR: NORMAL
SCAN OF BLOOD SMEAR: NORMAL
SODIUM SERPL-SCNC: 139 MEQ/L (ref 135–145)
WBC # BLD AUTO: 8.8 THOU/MM3 (ref 4.8–10.8)

## 2024-01-01 PROCEDURE — 99232 SBSQ HOSP IP/OBS MODERATE 35: CPT | Performed by: INTERNAL MEDICINE

## 2024-01-01 PROCEDURE — 99231 SBSQ HOSP IP/OBS SF/LOW 25: CPT | Performed by: INTERNAL MEDICINE

## 2024-01-01 PROCEDURE — 85520 HEPARIN ASSAY: CPT

## 2024-01-01 PROCEDURE — 86850 RBC ANTIBODY SCREEN: CPT

## 2024-01-01 PROCEDURE — 6370000000 HC RX 637 (ALT 250 FOR IP): Performed by: INTERNAL MEDICINE

## 2024-01-01 PROCEDURE — 1200000000 HC SEMI PRIVATE

## 2024-01-01 PROCEDURE — 86901 BLOOD TYPING SEROLOGIC RH(D): CPT

## 2024-01-01 PROCEDURE — 85027 COMPLETE CBC AUTOMATED: CPT

## 2024-01-01 PROCEDURE — 94640 AIRWAY INHALATION TREATMENT: CPT

## 2024-01-01 PROCEDURE — 6360000002 HC RX W HCPCS: Performed by: STUDENT IN AN ORGANIZED HEALTH CARE EDUCATION/TRAINING PROGRAM

## 2024-01-01 PROCEDURE — 6360000002 HC RX W HCPCS: Performed by: INTERNAL MEDICINE

## 2024-01-01 PROCEDURE — 86923 COMPATIBILITY TEST ELECTRIC: CPT

## 2024-01-01 PROCEDURE — 94761 N-INVAS EAR/PLS OXIMETRY MLT: CPT

## 2024-01-01 PROCEDURE — 2500000003 HC RX 250 WO HCPCS: Performed by: INTERNAL MEDICINE

## 2024-01-01 PROCEDURE — 82948 REAGENT STRIP/BLOOD GLUCOSE: CPT

## 2024-01-01 PROCEDURE — 2060000000 HC ICU INTERMEDIATE R&B

## 2024-01-01 PROCEDURE — P9040 RBC LEUKOREDUCED IRRADIATED: HCPCS

## 2024-01-01 PROCEDURE — 80048 BASIC METABOLIC PNL TOTAL CA: CPT

## 2024-01-01 PROCEDURE — 2580000003 HC RX 258: Performed by: STUDENT IN AN ORGANIZED HEALTH CARE EDUCATION/TRAINING PROGRAM

## 2024-01-01 PROCEDURE — 99233 SBSQ HOSP IP/OBS HIGH 50: CPT | Performed by: INTERNAL MEDICINE

## 2024-01-01 PROCEDURE — 36415 COLL VENOUS BLD VENIPUNCTURE: CPT

## 2024-01-01 PROCEDURE — 36430 TRANSFUSION BLD/BLD COMPNT: CPT

## 2024-01-01 PROCEDURE — P9016 RBC LEUKOCYTES REDUCED: HCPCS

## 2024-01-01 PROCEDURE — 73700 CT LOWER EXTREMITY W/O DYE: CPT

## 2024-01-01 PROCEDURE — 86900 BLOOD TYPING SEROLOGIC ABO: CPT

## 2024-01-01 PROCEDURE — 93010 ELECTROCARDIOGRAM REPORT: CPT | Performed by: INTERNAL MEDICINE

## 2024-01-01 PROCEDURE — 6370000000 HC RX 637 (ALT 250 FOR IP)

## 2024-01-01 PROCEDURE — 2500000003 HC RX 250 WO HCPCS: Performed by: STUDENT IN AN ORGANIZED HEALTH CARE EDUCATION/TRAINING PROGRAM

## 2024-01-01 PROCEDURE — 83735 ASSAY OF MAGNESIUM: CPT

## 2024-01-01 PROCEDURE — 84100 ASSAY OF PHOSPHORUS: CPT

## 2024-01-01 PROCEDURE — 2700000000 HC OXYGEN THERAPY PER DAY

## 2024-01-01 RX ORDER — FENTANYL CITRATE 50 UG/ML
50 INJECTION, SOLUTION INTRAMUSCULAR; INTRAVENOUS
Status: DISCONTINUED | OUTPATIENT
Start: 2024-01-01 | End: 2024-01-01 | Stop reason: HOSPADM

## 2024-01-01 RX ORDER — LORAZEPAM 2 MG/ML
1 INJECTION INTRAMUSCULAR EVERY 6 HOURS PRN
Status: DISCONTINUED | OUTPATIENT
Start: 2024-01-01 | End: 2024-01-01 | Stop reason: HOSPADM

## 2024-01-01 RX ORDER — FENTANYL CITRATE-0.9 % NACL/PF 10 MCG/ML
25-500 PLASTIC BAG, INJECTION (ML) INTRAVENOUS CONTINUOUS
Status: DISCONTINUED | OUTPATIENT
Start: 2024-01-01 | End: 2024-01-01 | Stop reason: HOSPADM

## 2024-01-01 RX ORDER — GLYCOPYRROLATE 0.2 MG/ML
0.2 INJECTION INTRAMUSCULAR; INTRAVENOUS EVERY 4 HOURS PRN
Status: DISCONTINUED | OUTPATIENT
Start: 2024-01-01 | End: 2024-01-01 | Stop reason: HOSPADM

## 2024-01-01 RX ORDER — GLYCOPYRROLATE 0.2 MG/ML
0.2 INJECTION INTRAMUSCULAR; INTRAVENOUS
Status: DISCONTINUED | OUTPATIENT
Start: 2024-01-01 | End: 2024-01-01 | Stop reason: HOSPADM

## 2024-01-01 RX ORDER — LORAZEPAM 2 MG/ML
1 INJECTION INTRAMUSCULAR
Status: DISCONTINUED | OUTPATIENT
Start: 2024-01-01 | End: 2024-01-01 | Stop reason: HOSPADM

## 2024-01-01 RX ORDER — HALOPERIDOL 5 MG/ML
2 INJECTION INTRAMUSCULAR EVERY 6 HOURS PRN
Status: DISCONTINUED | OUTPATIENT
Start: 2024-01-01 | End: 2024-01-01 | Stop reason: HOSPADM

## 2024-01-01 RX ORDER — MORPHINE SULFATE 2 MG/ML
2 INJECTION, SOLUTION INTRAMUSCULAR; INTRAVENOUS
Status: DISCONTINUED | OUTPATIENT
Start: 2024-01-01 | End: 2024-01-01 | Stop reason: HOSPADM

## 2024-01-01 RX ORDER — METOLAZONE 5 MG/1
5 TABLET ORAL ONCE
Status: COMPLETED | OUTPATIENT
Start: 2024-01-01 | End: 2024-01-01

## 2024-01-01 RX ORDER — LORAZEPAM 2 MG/ML
0.5 INJECTION INTRAMUSCULAR
Status: DISCONTINUED | OUTPATIENT
Start: 2024-01-01 | End: 2024-01-01

## 2024-01-01 RX ORDER — MORPHINE SULFATE 4 MG/ML
4 INJECTION, SOLUTION INTRAMUSCULAR; INTRAVENOUS
Status: DISCONTINUED | OUTPATIENT
Start: 2024-01-01 | End: 2024-01-01 | Stop reason: HOSPADM

## 2024-01-01 RX ORDER — SODIUM CHLORIDE 9 MG/ML
INJECTION, SOLUTION INTRAVENOUS PRN
Status: DISCONTINUED | OUTPATIENT
Start: 2024-01-01 | End: 2024-01-01

## 2024-01-01 RX ADMIN — FENTANYL CITRATE 50 MCG: 50 INJECTION INTRAMUSCULAR; INTRAVENOUS at 17:17

## 2024-01-01 RX ADMIN — MORPHINE SULFATE 2 MG: 2 INJECTION, SOLUTION INTRAMUSCULAR; INTRAVENOUS at 06:27

## 2024-01-01 RX ADMIN — Medication 50 MCG/HR: at 08:56

## 2024-01-01 RX ADMIN — GLYCOPYRROLATE 0.2 MG: 0.2 INJECTION INTRAMUSCULAR; INTRAVENOUS at 08:14

## 2024-01-01 RX ADMIN — GLYCOPYRROLATE 0.2 MG: 0.2 INJECTION INTRAMUSCULAR; INTRAVENOUS at 00:09

## 2024-01-01 RX ADMIN — LORAZEPAM 0.5 MG: 2 INJECTION INTRAMUSCULAR; INTRAVENOUS at 04:40

## 2024-01-01 RX ADMIN — LORAZEPAM 0.5 MG: 2 INJECTION INTRAMUSCULAR; INTRAVENOUS at 23:28

## 2024-01-01 RX ADMIN — GLYCOPYRROLATE 0.2 MG: 0.2 INJECTION INTRAMUSCULAR; INTRAVENOUS at 02:58

## 2024-01-01 RX ADMIN — FENTANYL CITRATE 50 MCG: 50 INJECTION INTRAMUSCULAR; INTRAVENOUS at 06:48

## 2024-01-01 RX ADMIN — MORPHINE SULFATE 2 MG: 2 INJECTION, SOLUTION INTRAMUSCULAR; INTRAVENOUS at 00:09

## 2024-01-01 RX ADMIN — FENTANYL CITRATE 50 MCG: 50 INJECTION INTRAMUSCULAR; INTRAVENOUS at 14:32

## 2024-01-01 RX ADMIN — GLYCOPYRROLATE 0.2 MG: 0.2 INJECTION INTRAMUSCULAR; INTRAVENOUS at 20:31

## 2024-01-01 RX ADMIN — MORPHINE SULFATE 2 MG: 2 INJECTION, SOLUTION INTRAMUSCULAR; INTRAVENOUS at 20:40

## 2024-01-01 RX ADMIN — GLYCOPYRROLATE 0.2 MG: 0.2 INJECTION INTRAMUSCULAR; INTRAVENOUS at 23:28

## 2024-01-01 RX ADMIN — GLYCOPYRROLATE 0.2 MG: 0.2 INJECTION INTRAMUSCULAR; INTRAVENOUS at 04:39

## 2024-01-01 RX ADMIN — LORAZEPAM 0.5 MG: 2 INJECTION INTRAMUSCULAR; INTRAVENOUS at 20:29

## 2024-01-01 RX ADMIN — GLYCOPYRROLATE 0.2 MG: 0.2 INJECTION INTRAMUSCULAR; INTRAVENOUS at 12:16

## 2024-01-01 RX ADMIN — GLYCOPYRROLATE 0.2 MG: 0.2 INJECTION INTRAMUSCULAR; INTRAVENOUS at 16:05

## 2024-01-01 RX ADMIN — LORAZEPAM 0.5 MG: 2 INJECTION INTRAMUSCULAR; INTRAVENOUS at 05:47

## 2024-01-01 RX ADMIN — GLYCOPYRROLATE 0.2 MG: 0.2 INJECTION INTRAMUSCULAR; INTRAVENOUS at 11:09

## 2024-01-01 RX ADMIN — MORPHINE SULFATE 2 MG: 2 INJECTION, SOLUTION INTRAMUSCULAR; INTRAVENOUS at 16:04

## 2024-01-01 RX ADMIN — FENTANYL CITRATE 50 MCG: 50 INJECTION INTRAMUSCULAR; INTRAVENOUS at 12:20

## 2024-01-01 RX ADMIN — FENTANYL CITRATE 50 MCG: 50 INJECTION INTRAMUSCULAR; INTRAVENOUS at 12:34

## 2024-01-01 RX ADMIN — FENTANYL CITRATE 50 MCG: 50 INJECTION INTRAMUSCULAR; INTRAVENOUS at 08:58

## 2024-01-01 RX ADMIN — BUMETANIDE 1 MG: 0.25 INJECTION INTRAMUSCULAR; INTRAVENOUS at 10:53

## 2024-01-01 RX ADMIN — FENTANYL CITRATE 50 MCG: 50 INJECTION INTRAMUSCULAR; INTRAVENOUS at 04:40

## 2024-01-01 RX ADMIN — LORAZEPAM 0.5 MG: 2 INJECTION INTRAMUSCULAR; INTRAVENOUS at 17:35

## 2024-01-01 RX ADMIN — HALOPERIDOL LACTATE 2 MG: 5 INJECTION, SOLUTION INTRAMUSCULAR at 06:39

## 2024-01-01 RX ADMIN — BUMETANIDE 1 MG: 0.25 INJECTION INTRAMUSCULAR; INTRAVENOUS at 20:31

## 2024-01-01 RX ADMIN — FENTANYL CITRATE 50 MCG: 50 INJECTION INTRAMUSCULAR; INTRAVENOUS at 21:13

## 2024-01-01 RX ADMIN — LORAZEPAM 0.5 MG: 2 INJECTION INTRAMUSCULAR; INTRAVENOUS at 01:10

## 2024-01-01 RX ADMIN — FENTANYL CITRATE 50 MCG: 50 INJECTION INTRAMUSCULAR; INTRAVENOUS at 01:10

## 2024-01-01 RX ADMIN — LORAZEPAM 0.5 MG: 2 INJECTION INTRAMUSCULAR; INTRAVENOUS at 08:13

## 2024-01-01 RX ADMIN — LORAZEPAM 0.5 MG: 2 INJECTION INTRAMUSCULAR; INTRAVENOUS at 06:48

## 2024-01-01 RX ADMIN — GLYCOPYRROLATE 0.2 MG: 0.2 INJECTION INTRAMUSCULAR; INTRAVENOUS at 14:39

## 2024-01-01 RX ADMIN — Medication 25 MCG/HR: at 08:10

## 2024-01-01 RX ADMIN — LORAZEPAM 1 MG: 2 INJECTION INTRAMUSCULAR; INTRAVENOUS at 08:13

## 2024-01-01 RX ADMIN — GLYCOPYRROLATE 0.2 MG: 0.2 INJECTION INTRAMUSCULAR; INTRAVENOUS at 04:14

## 2024-01-01 RX ADMIN — LORAZEPAM 0.5 MG: 2 INJECTION INTRAMUSCULAR; INTRAVENOUS at 12:31

## 2024-01-01 RX ADMIN — GLYCOPYRROLATE 0.2 MG: 0.2 INJECTION INTRAMUSCULAR; INTRAVENOUS at 17:32

## 2024-01-01 RX ADMIN — LORAZEPAM 0.5 MG: 2 INJECTION INTRAMUSCULAR; INTRAVENOUS at 22:19

## 2024-01-01 RX ADMIN — MORPHINE SULFATE 2 MG: 2 INJECTION, SOLUTION INTRAMUSCULAR; INTRAVENOUS at 14:06

## 2024-01-01 RX ADMIN — FENTANYL CITRATE 50 MCG: 50 INJECTION INTRAMUSCULAR; INTRAVENOUS at 05:47

## 2024-01-01 RX ADMIN — METOLAZONE 5 MG: 5 TABLET ORAL at 10:33

## 2024-01-01 RX ADMIN — LORAZEPAM 0.5 MG: 2 INJECTION INTRAMUSCULAR; INTRAVENOUS at 12:16

## 2024-01-01 RX ADMIN — Medication 4 ML: at 07:35

## 2024-01-01 RX ADMIN — MORPHINE SULFATE 2 MG: 2 INJECTION, SOLUTION INTRAMUSCULAR; INTRAVENOUS at 10:53

## 2024-01-01 RX ADMIN — FENTANYL CITRATE 50 MCG: 50 INJECTION INTRAMUSCULAR; INTRAVENOUS at 23:28

## 2024-01-01 RX ADMIN — LANSOPRAZOLE 30 MG: 30 TABLET, ORALLY DISINTEGRATING, DELAYED RELEASE ORAL at 06:15

## 2024-01-01 RX ADMIN — MORPHINE SULFATE 2 MG: 2 INJECTION, SOLUTION INTRAMUSCULAR; INTRAVENOUS at 04:13

## 2024-01-01 RX ADMIN — Medication 75 MCG/HR: at 12:20

## 2024-01-01 ASSESSMENT — PAIN SCALES - PAIN ASSESSMENT IN ADVANCED DEMENTIA (PAINAD)
CONSOLABILITY: 1
NEGVOCALIZATION: 1
FACIALEXPRESSION: 1
FACIALEXPRESSION: 1
BODYLANGUAGE: 2
CONSOLABILITY: 1
TOTALSCORE: 6
BREATHING: 1
CONSOLABILITY: 1
BREATHING: 1
TOTALSCORE: 6
TOTALSCORE: 6
CONSOLABILITY: 1
FACIALEXPRESSION: 0
BREATHING: 1
FACIALEXPRESSION: 1
NEGVOCALIZATION: 1
FACIALEXPRESSION: 1
FACIALEXPRESSION: 1
TOTALSCORE: 6
FACIALEXPRESSION: 1
NEGVOCALIZATION: 1
CONSOLABILITY: 1
BODYLANGUAGE: 2
TOTALSCORE: 6
FACIALEXPRESSION: 1
CONSOLABILITY: 1
NEGVOCALIZATION: 1
TOTALSCORE: 6
BODYLANGUAGE: 2
FACIALEXPRESSION: 1
FACIALEXPRESSION: 1
BREATHING: 1
NEGVOCALIZATION: 1
TOTALSCORE: 6
BODYLANGUAGE: 2
BREATHING: 1
BREATHING: 1
CONSOLABILITY: 1
BODYLANGUAGE: 2
BODYLANGUAGE: 2
CONSOLABILITY: 1
NEGVOCALIZATION: 1
BODYLANGUAGE: 2
TOTALSCORE: 6
BODYLANGUAGE: 1
FACIALEXPRESSION: 1
FACIALEXPRESSION: 1
TOTALSCORE: 6
BODYLANGUAGE: 2
NEGVOCALIZATION: 1
BODYLANGUAGE: 2
NEGVOCALIZATION: 1
BODYLANGUAGE: 2
NEGVOCALIZATION: 1
BODYLANGUAGE: 2
BODYLANGUAGE: 2
NEGVOCALIZATION: 1
BODYLANGUAGE: 2
BREATHING: 1
FACIALEXPRESSION: 1
BREATHING: 1
BREATHING: 1
CONSOLABILITY: 1
TOTALSCORE: 6
CONSOLABILITY: 1
CONSOLABILITY: 1
TOTALSCORE: 6
BODYLANGUAGE: 0
TOTALSCORE: 6
BODYLANGUAGE: 2
CONSOLABILITY: 0
BREATHING: 1
NEGVOCALIZATION: 1
FACIALEXPRESSION: 1
BODYLANGUAGE: 2
FACIALEXPRESSION: 1
NEGVOCALIZATION: 1
TOTALSCORE: 6
BREATHING: 1
TOTALSCORE: 4
FACIALEXPRESSION: 1
CONSOLABILITY: 1
NEGVOCALIZATION: 1
NEGVOCALIZATION: 1
BREATHING: 1
NEGVOCALIZATION: 1
NEGVOCALIZATION: 1
BREATHING: 1
CONSOLABILITY: 1
NEGVOCALIZATION: 1
BODYLANGUAGE: 2
CONSOLABILITY: 1
TOTALSCORE: 6
TOTALSCORE: 6
NEGVOCALIZATION: 1
BREATHING: 1
TOTALSCORE: 3
BREATHING: 1
CONSOLABILITY: 0
BODYLANGUAGE: 2
BODYLANGUAGE: 0
BODYLANGUAGE: 2
BODYLANGUAGE: 0
FACIALEXPRESSION: 0
NEGVOCALIZATION: 1
TOTALSCORE: 6
BODYLANGUAGE: 0
TOTALSCORE: 6
FACIALEXPRESSION: 1
FACIALEXPRESSION: 1
TOTALSCORE: 6
BREATHING: 1
CONSOLABILITY: 1
NEGVOCALIZATION: 1
NEGVOCALIZATION: 0
BODYLANGUAGE: 2
FACIALEXPRESSION: 1
BODYLANGUAGE: 2
NEGVOCALIZATION: 1
CONSOLABILITY: 1
TOTALSCORE: 5
CONSOLABILITY: 1
BODYLANGUAGE: 2
FACIALEXPRESSION: 1
FACIALEXPRESSION: 1
TOTALSCORE: 6
BREATHING: 1
NEGVOCALIZATION: 1
BREATHING: 1
CONSOLABILITY: 1
TOTALSCORE: 6
BREATHING: 1
TOTALSCORE: 2
CONSOLABILITY: 1
BREATHING: 1
TOTALSCORE: 6
BREATHING: 1
NEGVOCALIZATION: 1
BODYLANGUAGE: 2
CONSOLABILITY: 1
BREATHING: 1
FACIALEXPRESSION: 1
CONSOLABILITY: 1
BODYLANGUAGE: 2
NEGVOCALIZATION: 0
TOTALSCORE: 6
FACIALEXPRESSION: 1
CONSOLABILITY: 1
CONSOLABILITY: 1
BODYLANGUAGE: 2
BREATHING: 1
NEGVOCALIZATION: 1
BREATHING: 1
NEGVOCALIZATION: 1
FACIALEXPRESSION: 1
CONSOLABILITY: 1
NEGVOCALIZATION: 1
BREATHING: 1
FACIALEXPRESSION: 1
BREATHING: 1
BODYLANGUAGE: 2
NEGVOCALIZATION: 1
BODYLANGUAGE: 1
CONSOLABILITY: 0
TOTALSCORE: 6
BREATHING: 1
CONSOLABILITY: 1
TOTALSCORE: 6
NEGVOCALIZATION: 1
CONSOLABILITY: 1
NEGVOCALIZATION: 1
FACIALEXPRESSION: 1
FACIALEXPRESSION: 1
TOTALSCORE: 1
TOTALSCORE: 4
BREATHING: 1
BREATHING: 1
TOTALSCORE: 6
FACIALEXPRESSION: 1

## 2024-01-01 NOTE — SIGNIFICANT EVENT
Called to bedside due to patients increase O2 needs and patient complaints of chest pain. Nurse ordered STAT EKG and placed patient on non-rebreather mask. O2 sats were noted to be 73% on 4L and increased to 100% on non-rebreather. Patient reported midsternal chest pain unable to rate but described as sharp. Chest pain resolved with non-rebreather. EKG notes NSR with PVC's. Nitro x1 given. 2g Magnesium ordered. Chest pain likely pleuritic in nature. Patient alert with garbled speech. Lung sounds diminished. STAT CXR appears worsened left infiltrate/effusion since previous on 12/23/23. High flow O2 ordered. Transfer to step-down.   After patient transferred to step-down and placed on High flow, patient agitated and pulling off High-flow NC. Attempting to strike staff with attempts to redirect. Soft wrist restraints ordered bilaterally. Sitter at bedside.     Electronically signed by JASON Cheema CNP on 1/1/2024 at 12:20 AM         
[facility] RESPONSE was called for patient on 0006 AM. The patient had desaturated despite being on nonrebreather mask and was severely agitated upon arrival. The patient was placed on 100% FiO2 60 L HFNC with improvement in oxygenation status.    Stat CXR, BMP, CBC, Troponin, ABG, and Lactic Acid were all ordered for the patient.    ADDENDUM 0054: Discussed the patient with ICU. They recommended immediate treatment with albuterol, 1-time use of dulera with scheduled Stiolto, and 1 time IV Decadron 10 mg. If there is no improvement with this treatment, ICU will accept the patient for intubation.    Cruz Valera DO, PGY-3  
RN was called by mazin Donald into room due to patient stating that he was having difficultly breathing.   2145: Vital signs obtained. Oxygen saturation found to be 73% on 4L via nasal cannula. Increased to 6L via nasal cannula. Respirations: 42 Patient endorsing sudden onset chest pain-mid chest pressure. Unable to rate number states: \"pain\" BP: 167/70 with HR of 99.  Lung sounds diminished with fine crackles in bases. Remain unchanged from prior assessment.     2150: On 6L with no improvement in oxygen saturation with cough and deep breathing. Placed on non-rebreather mask at max.     2155: Oxygen saturation improved to 92% on non-rebreather and increasing.     2156: STAT EKG order placed     2159: Barbie Brennan CNP notified of vitals and patient's condition. En route.     2200: 100% on max non-rebreather. Respiratons 36. HR: 106 Patient states chest pain has resolved.     2205: FANTA Broussard and Barbie Brennan CNP at bedside.     2206: EKG at bedside.     2209: Orders entered by Barbie Brennan CNP. See Orders. Transfer ordered obtained. Call placed to HUB for bed. Patient transferring to 04. Verbal Order to wean down non-rebreather per FANTA Broussard    2213: FANTA Broussard stated to administer SL Nitro. Vitals obtained first. BP: 150/74 HR: 92. SL nitro administered. Weaning non-rebreather. Currently on 15L; saturation 100%    2214: RT called to come place patient on Heated HFNC.    2217: BP: 141/94 HR: 85    2223: Vitals obtained. BP: 122/68 HR: 94 Respirations 28 100 on non-rebreather at 12L.    2227: Report called to HEATHER Rizvi on 4K. All questions answered.     2230: 100% on 10L. Was weaned by FANTA Broussard.   HS Medications given by HEATHER Rock via NG tube. See MAR.     2240: Attempted to call son to update on plan of care and course of treatment. No answer. Call back # of [phone] given.    2250: Tube feed held per Barbie Brennan CNP until chest xray results.   RT not arrived yet. HEATHER Becerra 
non-distended. Bowel sounds present.  Psychiatric: Alert, oriented only to self.   Peripheral Pulses: +2 palpable, equal bilaterally.     ICU team contacted to evaluate patient due to increased risk for intubation. ICU team reviewed case, evaluated patient, and accepted transfer to the ICU for intubation. Handoff provided to ICU team prior to transfer.     Patient and family had been contacted multiple times over the past several days regarding possible need for intubation and are agreeable to any interventions felt necessary to prolong patient's life at this time. Patient has also stated multiple times that he trusts his son, Neftali Hazel, to make any difficult decisions regarding future management if he, the patient is unable to make those decisions himself.     Araceli Ghotra MD, IM-PGY2  12/12/23 at 8:18 AM       
Statement Selected

## 2024-01-02 PROBLEM — E87.0 HYPERNATREMIA: Status: ACTIVE | Noted: 2024-01-01

## 2024-01-02 PROBLEM — J96.01 ACUTE HYPOXEMIC RESPIRATORY FAILURE (HCC): Status: ACTIVE | Noted: 2024-01-01

## 2024-01-02 NOTE — PROGRESS NOTES
Progress note: Infectious diseases    Patient - Neftali Hazel,  Age - 88 y.o.    - 1935      Room Number - 4K-06/006-A   MRN -  [id number]   [facility] # - [id number]  Date of Admission -  2023  1:46 PM    SUBJECTIVE:   He remains very ill non high flow  OBJECTIVE   VITALS    height is 1.727 m (5' 8\") and weight is 82.6 kg (182 lb 1.6 oz). His oral temperature is 97.6 °F (36.4 °C). His blood pressure is 164/72 (abnormal) and his pulse is 66. His respiration is 20 and oxygen saturation is 98%.       Wt Readings from Last 3 Encounters:   23 82.6 kg (182 lb 1.6 oz)       I/O (24 Hours)    Intake/Output Summary (Last 24 hours) at 2023 0924  Last data filed at 2023 0624  Gross per 24 hour   Intake 1958.38 ml   Output 1700 ml   Net 258.38 ml       General Appearance  lethargic  HEENT - normocephalic, atraumatic, pale conjunctiva,  anicteric sclera  Neck - Supple, no mass  Lungs -  Bilateral  air entry, + rhonchi, no wheeze  Cardiovascular - Heart sounds are normal.     Abdomen - soft, not distended, nontender,   Neurologic -lethargic  Skin - No bruising or bleeding  Extremities -staples over the right hip  MEDICATIONS:      tiotropium-olodaterol  2 puff Inhalation Daily    hydrOXYzine pamoate  50 mg Oral TID    polyethylene glycol  17 g Oral Daily    gabapentin  300 mg Oral Daily    cefTRIAXone (ROCEPHIN) IV  1,000 mg IntraVENous Q24H    doxycycline (VIBRAMYCIN) IV  100 mg IntraVENous Q12H    insulin glargine  4 Units SubCUTAneous QAM    pravastatin  10 mg Oral Nightly    metoprolol succinate  50 mg Oral Daily    insulin lispro  0-4 Units SubCUTAneous TID WC    insulin lispro  0-4 Units SubCUTAneous Nightly    ipratropium 0.5 mg-albuterol 2.5 mg  1 Dose Inhalation Q4H WA RT    pantoprazole  40 mg Oral QAM AC    amLODIPine  10 mg Oral Daily    [Held by provider] furosemide  20 mg Oral Daily    aspirin  81 mg 
                                                                       Hospitalist Progress Note      Patient:  Neftali Hazel    Unit/Bed:-04/004-A  YOB: 1935  MRN: [id number]   Acct: [id number]   PCP: Brenda Prajapati MD  Date of Admission: 12/8/2023    Assessment/Plan:    Acute on chronic hypoxic respiratory failure requiring mechanical ventilation from 12/8 - 12/21  End stage COPD with emphysematous fibrosis  Aspiration pneumonia - E coli, PSA  Overnight, patient had increased work of breathing, hypoxia. Placed on HFNC and transferred to .   Remains with marginal saturation on HFNC. Extensive discussion held with patient's son (Neftali) and granddaughter. Has been clinically worsening. Still DNR/DNI. Given deterioration, guarded prognosis, Neftali Al felt that it would be within his father's wishes to be transitioned to comfort measures only  Discontinue all unnecessary medications. Transition off HFNC.   Hospice consulted, palliative already on board  Start morphine for air hunger, glycopyrolate for secretions, ativan/haldol for agitation     RAMANDEEP on CKD III  hypernatremia  Creat 3.6. patient not amenable to HD  As above, transitioned to hospice  Appreciate nephrology involvement      Acute HFrEF (LVEF 45%)  Hypervolemic, on increased O2  Transitioned to comfort measures, as above    History of right hip PJI s/p intervention  Right gluteal/hip hematoma  Hb of 6.6 overnight  CT femur ordered.  Transitioned to comfort     Chief Complaint: dyspnea    Subjective (past 24 hours):   Overnight, patient more agitated, tachypneic, hypoxic. XR with worsening infiltrates. Placed on HFNC. Transferred to . This morning, pt is nonparticipatory. Discussed care with patient's son and granddaughter. States that his father is getting worse. Wants him to go home on hospice, but understands that getting him home safely is not likely. Transitioned to comfort measures only here.     Past medical history, 
                                                                       Hospitalist Progress Note      Patient:  Neftali Hazel    Unit/Bed:3A-11/011-A  YOB: 1935  MRN: [id number]   Acct: [id number]   PCP: Brenda Prajapati MD  Date of Admission: 12/8/2023    Assessment/Plan:    Acute on chronic hypoxic respiratory failure requiring mechanical ventilation from 12/8 - 12/21  End stage COPD with emphysematous fibrosis  Aspiration pneumonia - E coli, PSA  Afebrile, downtrending WBC. Remains on 4L LFNC  Holding bumex  Plan for TTE   Continue aggressive pulmonary toiletin  Continue stiolto  S/p CTX from 12/1- - 12/16 for e coli from BAL  S/p zosyn/cefepime from 12/17 - 12/24 for PSA on trach aspirate      RAMANDEEP on CKD III  hypernatremia  Cr 3.6 12/31/2023 , peak Cr 3.6. Sodium levels have improved.   Nephrology consulted  Hold fluids. Concern for need for HD, but pt refusing  Potassium stable.   Continue FWF      Acute HFrEF (LVEF 45%)  Hypervolemic. Holding diuresis d/t renal function though maybe cardiorenal cmpt  No GDMT onboard  Beta bloacker deferred d/t wheezing  ACEi/ARB, MRA, ARNI deferred d/t renal function      History of right hip PJI s/p intervention  Right gluteal/hip hematoma  No abx. Hb has remained relatively stable.   Ortho signed off. Follow up with [facility]     Dysphagia  delirium  Aspiration events with dysphagia  MBS 12/29/23 - pt to remain strict NPO. Patient aggravated. Discussed with son. Will attempt to coordinate MBS 01/01/24. If fails, then will transition to comfort measures with pleasure feeds.      Bilateral DVT  Acute blood loss anemia  Presence of BLE DVT. On heparin gtt.   Hb stable. Hold DOAC until MBS     HTN  Normotensive   C/w norvasc, clonidine     Afib RVR  Rate controlled off beta blocker.  On heparin gtt     Delirium   Baseline mentation, friendly  Avoid BZD  Starting seroquel qhs, uptitrate as needed.       Diet: NPO  VTE Ppx: heparin gtt   PT/OT: pending 
                                                                       Hospitalist Progress Note      Patient:  Neftali Hazel    Unit/Bed:4K-04/004-A  YOB: 1935  MRN: [id number]   Acct: [id number]   PCP: Brenda Prajapati MD  Date of Admission: 12/8/2023    Assessment/Plan:    Acute on chronic hypoxic respiratory failure requiring mechanical ventilation from 12/8 - 12/21  End stage COPD with emphysematous fibrosis  Aspiration pneumonia - E coli, PSA  Patient transitioned to comfort measures/DNR CC on 1/1/24 after worsening respiratory status and discussion with pt's family  Seen at bedside with agonal breathing. Nonparticipatory in interview.  Discontinue all unnecessary medications. Transition off HFNC.   Hospice consulted  continue morphine for air hunger, glycopyrolate for secretions, ativan/haldol for agitation     Chief Complaint: dyspnea    Subjective (past 24 hours):   Agonal breathing, obtunded.     Past medical history, family history, social history and allergies reviewed again and is unchanged since admission.    ROS (All review of systems completed.  Pertinent positives noted. Otherwise All other systems reviewed and negative.)     Medications:  Reviewed    Infusion Medications    sodium chloride      [Held by provider] heparin (PORCINE) Infusion Stopped (01/01/24 1031)    dextrose       Scheduled Medications    [Held by provider] bisacodyl  10 mg Rectal Daily    [Held by provider] sennosides-docusate sodium  1 tablet Per NG tube BID    [Held by provider] polyethylene glycol  17 g Oral BID    [Held by provider] diclofenac sodium  2 g Topical BID    QUEtiapine  25 mg Oral Nightly    [Held by provider] cloNIDine  0.1 mg Oral q8h    [Held by provider] sodium chloride (Inhalant)  4 mL Nebulization BID    [Held by provider] lansoprazole  30 mg Orogastric QAM AC    [Held by provider] tiotropium-olodaterol  4 puff Inhalation Daily    [Held by provider] gabapentin  300 mg Oral BID    [Held by 
                                                           Early Mobility    Respiratory therapy participated in early mobility for 30 minutes. The patient was able to tolerate early mobility.    Current ventilator settings: SPONT PS 10  PEEP 6  FiO2 30%    Ventilator settings needed adjusting to support activity.Yes, PS increased to 16.  
                                             Patient Weaning Progress    The patient's vent settings was not able to be weaned this shift.      Ventilator settings that were weaned              [] Mode   [] Pressure support weaned   [] Fio2 weaned   [] Peep weaned      Spontaneous weaning trial  was not attempted.     due to defined parameters for SBT (spontaneous breathing trial) not being met.     *Results of SBT documented under SBTOUTCOME note.    Reason that defined ventilator parameters for SBT was not met              [] Patient condition requires increased ventilator settings  [] Requires increased sedation   [] Settings not within weaning range   [] SAT not completed   [] Physician orders    Evac tube was not  hooked up with continuous low suction (20-30mmHg)      Cuff was not  deflated to determine cuff leak.    Unable to get agreement for goals because no family is present and patient cannot respond.  
                                             Patient Weaning Progress    The patient's vent settings was not able to be weaned this shift.      Ventilator settings that were weaned              [] Mode   [] Pressure support weaned   [] Fio2 weaned   [] Peep weaned      Spontaneous weaning trial  was not attempted.     due to defined parameters for SBT (spontaneous breathing trial) not being met.     *Results of SBT documented under SBTOUTCOME note.    Reason that defined ventilator parameters for SBT was not met              [] Patient condition requires increased ventilator settings  [] Requires increased sedation   [x] Settings not within weaning range   [] SAT not completed   [] Physician orders      Unable to get agreement for goals because no family is present and patient cannot respond.     
                                             Patient Weaning Progress    The patient's vent settings was not able to be weaned this shift.      Ventilator settings that were weaned              [] Mode   [] Pressure support weaned   [] Fio2 weaned   [] Peep weaned      Spontaneous weaning trial  was not attempted.     due to defined parameters for SBT (spontaneous breathing trial) not being met.     *Results of SBT documented under SBTOUTCOME note.    Reason that defined ventilator parameters for SBT was not met              [x] Patient condition requires increased ventilator settings  [] Requires increased sedation   [] Settings not within weaning range   [] SAT not completed   [] Physician orders      Cuff was not  deflated to determine cuff leak.    Unable to get agreement for goals because no family is present and patient cannot respond.                                               
      Internal Medicine Resident Progress Note    Patient:  Neftali Hazel    YOB: 1935  Unit/Bed:4K-06/006-A  MRN: [id number]    Acct: [id number]   PCP: Brenda Prajapati MD    Date of Admission: 12/8/2023      Assessment/Plan:  Acute on chronic respiratory failure with hypoxemia.  Likely multifactorial: Imaging consistent with multifocal pneumonia, pulmonary vascular congestion, COPD, interstitial lung disease. Cannot rule out PE. Started on steroids, ceftriaxone, doxycycline.  Continues to require high flow nasal cannula. Requiring HHFNC. On nighttime supplemental O2 outpatient, but does not use supplemental O2 during the daytime.  Continue supplemental O2, titrate to SpO2 >88-96%, wean as tolerated.  Continue CTX (end date 12/16/2023) and doxycycline (end date 12/17/2023).  Continue duonebs q4h while awake and albuterol nebulizer prn.   Encourage aggressive pulmonary hygiene: IS and acapella ordered.  Pulmonology consulted, appreciate recommendations.   Pending connective tissue workup and pleural fluid analysis after thoracentesis.   ICU notified of increased risk for intubation.  Acute on chronic HFmrEF, systolic dysfunction.  Cool and dry.  Patient has been receiving intermittent diuretics at outlying facility and here.  1.3 L output in 24 hours.  Echo at OSH shows LVEF 45%. BNP 6529 on 11/28/2023 at OSH. CXR on admit demonstrates cardiomegaly, prominent interstitial and ill-defined airspace disease suspicious for edema v infection and probable bilateral pleural effusion; diffuse crackles on exam, no pitting bilateral lower extremity edema. Received furosemide 40mg IV on admit.   Daily weights  Strict I/Os  Nephrology has been consulted for assistance with diuresis given the CKD stage III/IV.  Continue to monitor for changes in volume status  Pneumonia, suspected HAP. Respiratory cultures at OSH grew gram negative bacilli. Repeat cultures pending. CURB65 score 2. PSI/PORT score 128, Class 
  CRITICAL CARE MEDICINE Progress notes     Patient:  Neftali Hazel    Unit/Bed:4D-13/013-A  MRN: [id number]   PCP: Brenda Prajapati MD  Date of Admission: 12/8/2023    Assessment and Plan(All pulmonary edema, renal failure, PE, and respiratory failure diagnoses are acute in nature unless otherwise specified):        Acute on Chronic Hypoxemic Respiratory Failure: Secondary to emphysematous fibrosis from end stage COPD worsened by pneumonia. Intubated 12/8/23.  Extubated 12/21/23.  On supplemental oxygen.  Will wean FiO2 keep saturation more than 90%  Delirium:  Manage with Precedex and Zyprexa.  Avoid benzodiazepines if possible.  Benzodiazepines should be third line utilization.  Pulmonary Fibrosis: Extensive disease on CT with multiple rows upon multiple rows of honeycomb cysts.  Anticipate chronic tachypnea.  A RR in the mid twenties appears to be baseline per Dr. Miguel Rees MD.  Emphysema: Secondary to prior smoking.  Subsequent emphysematous fibrosis with irreversible fibrosis.  LAMA/LABA.  Post steroids..  Pneumonia: Aspiration in nature.  Secondary to E coli.  Patient completed coarse of Rocephin.  Repeat culture 12.17/13 grew Pseudomonas.  Zosyn started 12/19/23 and then transitioned to cefepime based on culture results 12/21/23.  Day 4/10 cefepime.  Cardiomyopathy: EF 45%. Diurese as required.   Bilateral LE DVT: On heparin drip-on hold due to anemia of uncertain etiology with a drop in H&Hb.  Patient noted to have multiple loculated mixture attenuated areas involving the vastus lateralis and there is a collection of heterogenous material in the posterior compartment measuring 12 x 10 x 8 cm in size.  Patient was evaluated by Dr. Irvin Aguirre MD service from orthopedics-patient was cleared to start back on heparin drip from orthopedic service.  If patient right hip hematoma get worse, orthopedic service to be notified.  No plans for any intervention at this time.  I spoke with Cruz Mauricio 
  [place] [facility]      Patient - Neftali Hazel   MRN -  [id number]   [id number]   - 1935      Date of Admission -  2023  1:46 PM  Date of evaluation -  2023  Room - 4D--A   Hospital Day - 4  Consulting - Miguel Rees MD Primary Care Physician - Brenda Prajapati MD     Problem List      Active Hospital Problems    Diagnosis Date Noted    Renal failure syndrome [N19] 2023    Essential hypertension [I10] 2023    Hyperkalemia [E87.5] 2023    Chronic systolic CHF (congestive heart failure) (HCC) [I50.22] 2023    ILD (interstitial lung disease) (HCC) [J84.9] 2023    Pleural effusion, bilateral [J90] 2023    Acute hypoxic respiratory failure (HCC) [J96.01] 2023    Acute respiratory failure with hypoxia (HCC) [J96.01] 2023     Reason for Consult    Hypoxic respiratory failure, on HFNC  HPI   History Obtained From: Patient and electronic medical record.    Neftali Hazel is a 88 y.o. male with PMH as below who presented to [facility] as a direct transfer from [facility] on 23. Pt states that he went to [facility] for a revision of his right hip. He had an abx spacer placed after prosthetic joint infection and presented to  for revision arthroplasty on 23. He experienced postoperative hypoxia necessitating HFNC and was unable to wean appropriately and was therefore transferred to [facility]. He arrived on HFNC with FiO2 60% at 40L. He was initially able to be titrated down to FiO2 40% at 20L, however experienced sudden onset respiratory distress in the late afternoon with increased supplemental O2 requirements at FiO2 80% at 20L, because he could not tolerate higher pressures. Pt's respiratory status improved s/p Furosemide 40mg IV and Percocet. Pt understands that he is at high risk for intubation should he experience similar increased WOB. Pt and son are agreeable to intubation if needed. Pt has named his son 
  Pharmacy Note - Extended Infusion Beta-Lactam Adjustment    Piperacillin/Tazobactam 3375mg Q8H ordered for treatment of CAP. Per [facility] Extended Infusion Beta-Lactam Policy, Zosyn will be changed to 3375 mg Q12H.     Estimated Creatinine Clearance: SCr 2.7 on 12/7/23 0453 ([facility]), wt of 84.4 kg  CrCl = 15.8 mL/min    Dialysis Status, RAMANDEEP, CKD: TBD    BMI: There is no height or weight on file to calculate BMI.    Rationale for Adjustment: Agent demonstrates time-dependent effect on bacterial eradication. Extended-infusion dosing strategy aims to enhance microbiologic and clinical efficacy.    Plan:   Last dose of Zosyn documented as given 12/7/23 at 2306  Pharmacy will continue to monitor cultures and sensitivities (where available) and adjust dose as necessary.      Please call with any questions.    Thank you,  Ryanne Lentz, PharmD, BCPS 12/8/2023 5:01 PM  
 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  Patient had antibiotic spacer placed after prosthetic joint infection presented to [facility] for revision of her arthroplasty on 2023.  Rapid response was called on  at 0006 a.m.  Patient desaturated despite being on nonrebreather mask and was severely agitated.  Patient was placed on 100% FiO2 60 L high flow nasal cannula with improvement in oxygenation status.  Pt intubated . and extubated      Prior Level of Function:  Lives With: Family, Son  Type of Home: House  Home Layout: Two level, Able to Live on Main level with bedroom/bathroom        Additional Comments: Unclear of patient previous level of function at this time.    Restrictions/Precautions:  Restrictions/Precautions: Fall Risk, Weight Bearing  Right Lower Extremity Weight Bearing: Weight Bearing As Tolerated  Position Activity Restriction  Hip Precautions: No hip flexion > 90 degrees, No ADduction, No hip internal rotation  Other position/activity restrictions: pt [place]      SUBJECTIVE: Pt resting in bed and agrees to try getting up to try standing.    PAIN: denied pain when asked: grimaced with R hip exercises.    Vitals: Oxygen: ranged 92-98% with O2 at 6 
 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  Patient had antibiotic spacer placed after prosthetic joint infection presented to [facility] for revision of her arthroplasty on 2023.  Rapid response was called on  at 0006 a.m.  Patient desaturated despite being on nonrebreather mask and was severely agitated.  Patient was placed on 100% FiO2 60 L high flow nasal cannula with improvement in oxygenation status.  Pt intubated . and extubated      Prior Level of Function:  Lives With: Family, Son  Type of Home: House  Home Layout: Two level, Able to Live on Main level with bedroom/bathroom        Additional Comments: Unclear of patient previous level of function at this time.    Restrictions/Precautions:  Restrictions/Precautions: Fall Risk, Weight Bearing  Right Lower Extremity Weight Bearing: Weight Bearing As Tolerated  Position Activity Restriction  Hip Precautions: No hip flexion > 90 degrees, No ADduction, No hip internal rotation  Other position/activity restrictions: pt [place]      SUBJECTIVE: Pt resting in bed with live  present. Both agree to therapy and RN approved session.    PAIN: no # given: Pt grimaces with some movements of R LE.    Vitals: Oxygen: ranged 
 [facility]  Inpatient Physical Therapy  Daily Note  [facility] ICU 4D - 4D-13/013-A    Time In: 1033  Time Out: 1057  Timed Code Treatment Minutes: 24 Minutes  Minutes: 24          CoTx completed due to increased medical complexity, pt requiring 2+ assist and inability to tolerate separate sessions.     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  Patient had antibiotic spacer placed after prosthetic joint infection presented to [facility] for revision of her arthroplasty on 2023.  Rapid response was called on  at 0006 a.m.  Patient desaturated despite being on nonrebreather mask and was severely agitated.  Patient was placed on 100% FiO2 60 L high flow nasal cannula with improvement in oxygenation status.  Pt intubated .     Prior Level of Function:  Lives With: Family, Son  Type of Home: House  Home Layout: Two level, Able to Live on Main level with bedroom/bathroom        Additional Comments: Unclear of patient previous level of function at this time.    Restrictions/Precautions:  Restrictions/Precautions: Fall Risk, Weight Bearing  Right Lower Extremity Weight Bearing: Weight Bearing As Tolerated  Position Activity Restriction  Hip Precautions: No hip flexion > 90 degrees, No ADduction, No hip internal rotation     Lines/Tubes: Oral Intubation    SUBJECTIVE: RN approved session. - nursing had turned sedation down prior to EM for improved participation he was 
 [facility]  Inpatient Physical Therapy  Daily Note  [facility] ICU 4D - 4D-13/013-A    Time In: 928  Time Out: 958  Timed Code Treatment Minutes: 30 Minutes  Minutes: 30        CoTx completed due to increased medical complexity, pt requiring 2+ assist and inability to tolerate separate sessions.     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  Patient had antibiotic spacer placed after prosthetic joint infection presented to [facility] for revision of her arthroplasty on 2023.  Rapid response was called on  at 0006 a.m.  Patient desaturated despite being on nonrebreather mask and was severely agitated.  Patient was placed on 100% FiO2 60 L high flow nasal cannula with improvement in oxygenation status.  Pt intubated .     Prior Level of Function:  Lives With: Family, Son  Type of Home: House  Home Layout: Two level, Able to Live on Main level with bedroom/bathroom        Additional Comments: Unclear of patient previous level of function at this time.    Restrictions/Precautions:  Restrictions/Precautions: Fall Risk, Weight Bearing  Right Lower Extremity Weight Bearing: Weight Bearing As Tolerated  Position Activity Restriction  Hip Precautions: No hip flexion > 90 degrees, No ADduction, No hip internal rotation     Lines/Tubes: Oral Intubation    SUBJECTIVE: RN approved session. Ok to see pt per nursing. Nursing reports, sedation turned off at 0915 for EM. Pt in 
 was rounding in the unit to assess any spiritual care needs present as part of the Interdisciplinary Team for Palliative Care.  Patient is currently on the ventilator and unable to engage in conversation at this time.  There were no family members present at the time of visit.   will continue to follow-up with patient/family to assess any spiritual care needs present.  
0001 Pt desat to 86% on cardiac monitor. This nurse to the room to find the patient agitated and pale white with his heated High flow in his hand broken into 2 pieces. This nurse sat the patient up and placed the non rebreather on the patient. This nurse then called respiratory to grab another HHF nose piece. Pt was continuing to desaturate with the non rebreather to 49-50% and not recovering. This had happened several other times this shift and the first time this nurse contacted the resident group around 730pm bc the patient was anxious and asking for medication to help with anxiety. Medication did help and patient was able to rest and o2 saturation was in the 90s with HHF 60L 60%. Patient had several other coughing fits where he was pale and Spo2 was in the 50s. This nurse and the other nurse on the pod was able to recover the patients Spo2 to the 90s with the non-rebreather. This episode the patient was not recovering so this called Charge Nurse, Resource Nurse, followed by rapid response team to the room for respiratory distress. The patient was pale and not responsive. This nurse and the Pod partner nurse attempted to bag ventilate the patient while awaiting the Teams help.     0008 Rapid response called to room 4k06 d/t pt O2 saturation at 49-50% on non rebreather.  0009 Pt was bag ventilated.  0010 Non re breather placed back on the patient. Team arrived to the room.  0010 Pulse ox 90%  0013 Labs drawn.  0016 HR 87, Spo2 94%, /60  0019 ABG's Drawn  0020 Pt placed back on HHF 60L 100%. Spo2 99%  0027 CXR  0028 12 Lead EKG    Decision not to intubate at this time.  
0915-  Propofol stopped for early mobility.  Plan directed by Dr. Rees.  Plans to return to previous sedation levels after the therapy session.  0935-  Patient slow to wake.  Therapy at the bedside.  Dr. Rees at the bedside.  Plan of care discussed with family  0959-  Therapy session finishing up. RR acceptable at 24.  Will keep off sedation for now to ensure equal motor movement in all extremities.  Still not following commands.   1016-  Patient still not following commands, will continue to leave propofol drip paused.  1522-  Patient responding equally in the upper extremities.  Unable to follow commands in the lower extremities.  Will keep the sedation off as long as he is tolerating.  Patient able to nod yes/no to questions but still seems to be mildly sedated.  1555-  RR 40.  Patient showing increased work of breathing.  Patient now able to wiggle toes.  Hand grasp equal and weak.  Nodding yes and no.  50 mg. Propofol bolus given per Dr. Rees's orders. Propofol restarted at the earlier rate 35 mcg/min.  
[facility]  OCCUPATIONAL THERAPY MISSED TREATMENT NOTE  STRZ ICU 4D  4D-13/013-A      Date: 2023  Patient Name: Neftali Hazel        CSN: [id number]   : 1935  (88 y.o.)  Gender: male                REASON FOR MISSED TREATMENT: Hold Treatment per Nursing. Pt transferring to ICU for potential intubation. Not medically appropriate for therapy at this time. Will check back as able               
[facility]  STR ICU 4D  Occupational Therapy  Daily Note  Co-tx completed with PT due to medically complex, requiring assist X2 to safely complete, and would not tolerate 2 separate therapy sessions.     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  Patient had antibiotic spacer placed after prosthetic joint infection presented to [facility] for revision of the arthroplasty on 2023.  Patient experienced postoperative hypoxia necessitating high flow nasal cannula and was unable to wean appropriately.  Based on this, patient was transferred to [facility].  Patient has noticed on his proxy decision-maker should he be unable to make decisions himself.  Rapid response was called on  at 0006 a.m.  Patient desaturated despite being on nonrebreather mask and was severely agitated.  Patient was placed on 100% FiO2 60 L high flow nasal cannula with improvement in oxygenation status.  Discussed with patient's the risks and benefits of intubation.  Patient was alert and oriented x 3, voiced understanding, and stated consent for this procedure.  Patient was transferred to ICU for intubation and further management and monitoring. intubated     Restrictions/Precautions:  Restrictions/Precautions: Fall Risk, Weight Bearing  Right Lower Extremity Weight Bearing: Weight 
[facility]  SPEECH THERAPY  STRZ ICU 4D  Speech Evaluation - Early Mobility      SLP Individual Minutes  Time In: 1408  Time Out: 1416  Minutes: 8  Timed Code Treatment Minutes: 0 Minutes       Date: 2023  Patient Name: Neftali Hazel      CSN: [id number]   : 1935  (88 y.o.)  Gender: male   Referring Physician: Barney Douglas MD  Diagnosis: Acute hypoxic respiratory failure   Restrictions: Bilateral wrist restraints    History of Present Illness/Injury: Patient admitted to [facility] with above med dx; please refer to physician H&P for full details. 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  Patient had antibiotic spacer placed after prosthetic joint infection presented to [facility] for revision of her arthroplasty on 2023.  Patient experienced postoperative hypoxia necessitating high flow nasal cannula and was unable to wean appropriately.  Based on this, patient was transferred to [facility].  Patient has noticed on his proxy decision-maker should he be unable to make decisions himself.  Rapid response was called on  at 0006 a.m.  Patient desaturated despite being on nonrebreather mask and was severely agitated.  Patient was placed on 100% FiO2 60 L high flow nasal cannula with improvement in oxygenation status.  Discussed with patient's the risks and benefits of intubation.  Patient was alert and oriented x 3, voiced understanding, and stated consent for this procedure  Patient was transferred to ICU for intubation and further management and monitoring.\"    12/10 Renal US: Increased bilateral renal echogenicity consistent with 
[facility]  SPEECH THERAPY MISSED TREATMENT NOTE  STRZ ICU 4D      Date: 2023  Patient Name: Neftali Hazel        MRN: [id number]    : 1935  (88 y.o.)    REASON FOR MISSED TREATMENT:  Attempted to see patient for clinical swallow evaluation s/p extubation; however, HEATHER Drew reports patient with absence for command following s/t pharmaceutical interventions via ativan. ST to f/u on this date as patient becomes medically appropriate.     Perlita Garcia MA, CCC-SLP [id number]          
[facility]  SPEECH THERAPY MISSED TREATMENT NOTE  STRZ ICU 4D      Date: 2023  Patient Name: Neftali Hazel        MRN: [id number]    : 1935  (88 y.o.)    REASON FOR MISSED TREATMENT:  Attempted to see patient at 1016 for completion of cognitive communication evaluation per early mobility s/t ongoing oral intubation. HEATHER Ames reporting lack of command following at date, needs to defer assessment. Will complete as medical/respiratory status permits.       Micheline Ni M.A., CCC-SLP [id number]          
[facility]  SPEECH THERAPY COMMUNICATION NOTE  STRZ ICU STEPDOWN TELEMETRY 4K      Date: 2024  Patient Name: Neftali Hazel        MRN: [id number]    : 1935  (88 y.o.)    REASON FOR COMMUNICATION:  ST spoke with HEATHER Kerns to confirm patient's recent code status change to DNR-CC. ST will sign off on patient d/t change in POC/plans for hospice. Should code status change and need for diet recommendations change, please re-consult.     Fernanda Washburn MS, CF-SLP [id number]            
Attempt made to update patient's son.  No answer  
Attempted to call son to notify that patient was being transferred. No answer, voicemail left to call RN back.   
[facility]  SPEECH THERAPY MISSED TREATMENT NOTE  STRZ ICU 4D      Date: 2023  Patient Name: Neftali Hazel        MRN: [id number]    : 1935  (88 y.o.)    REASON FOR MISSED TREATMENT:  ST received novel orders for multi communication assessment per early mobility protocol given continued need for oral intubation. Spoke with RNKeny who approved session given appropriate patient command following this AM; however, reports increase in sedation medications required this afternoon. Upon ST entrance patient sleeping soundly. Adequate eye opening elicited with verbal/tactile cues (I.e. sternal rub); however, patient unable to maintain adequate alertness levels for further assessment. Will check back tomorrow, 12/15.     Janay Tillman M.S. CCC-SLP [id number] 2023    
Call received from son Neftali. Updated son that patient is now back on Heated HFNC and was transferred to [facility].   
Call received from unit nurse with request for hospice referral.  Call placed to son Neftali.  Message left with update on requested referral Requested Neftali to call office (number provided) or Liaison follow up when available.  
Comprehensive Nutrition Assessment    Type and Reason for Visit:  Initial, Consult (verbal consult per Dr Douglas regarding tube feed start)    Nutrition Recommendations/Plan:   Begin Nepro at 15ml/ hour, increase by 10ml every 6 hours as tolerates to goal 35ml/ hour  Additional free water flush as per Physician      Malnutrition Assessment:  Malnutrition Status:  Insufficient data (12/13/23 1512)    Context:  Acute Illness     Findings of the 6 clinical characteristics of malnutrition:  Energy Intake:  Unable to assess (intubated)  Weight Loss:  Unable to assess (no weight records per EMR)     Body Fat Loss:  Mild body fat loss Orbital   Muscle Mass Loss:  Mild muscle mass loss Temples (temporalis)  Fluid Accumulation:  Unable to assess     Strength:  Not Performed    Nutrition Assessment:     Pt. nutritionally compromised AEB NPO status due to intubation 12/12.  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       Nutrition Related Findings:    Pt. Report/Treatments/Miscellaneous: intubated, OG tube, spoke with Dr Douglas - tube feed start OK per Dr Douglas, spoke with RN   GI Status: BM 12/10, abdomen soft per RN  Pertinent Labs: Sodium 140, Potassium 4.3, BUN 87, Creatinine 2.8, Glucose 156  Pertinent Meds: rocephin, lantus, humalog, glycolax, deltasone, precedex, diprivan     Wound Type: Surgical Incision (antibiotic spacer and revision hip arthroplasty; perineum stage II, buttock stage I and II, coccyx stage II)       Current Nutrition Intake & Therapies:          Diet NPO  ADULT TUBE FEEDING; Orogastric; Renal Formula; Continuous; 15; Yes; 10; Q 6 hours; 35; 30; Q 4 hours  Current Tube Feeding (TF) Orders:  Feeding Route: Orogastric  Formula: Renal Formula 
Comprehensive Nutrition Assessment    Type and Reason for Visit:  Reassess (TF monitoring)    Nutrition Recommendations/Plan:   Continue Nepro at goal of 35ml/hour  100ml free water flush every 4 hours per Dr Bergeron on 12/20/23  Monitoring diprivan, labs, GI status for nutrition adjustments as appropriate     Malnutrition Assessment:  Malnutrition Status:  At risk for malnutrition (Comment) (12/20/23 1418)    Context:  Acute Illness     Findings of the 6 clinical characteristics of malnutrition:  Energy Intake:  No significant decrease in energy intake (receiving tube feed at goal; son reports patient with great appetite/ intake PTA)  Weight Loss:  Unable to assess (no weight records per EMR)     Body Fat Loss:   (question related to advanced age) Orbital   Muscle Mass Loss:   (question related to advanced age) Temples (temporalis)  Fluid Accumulation:  Mild     Strength:  Not Performed    Nutrition Assessment:      Pt. nutritionally compromised AEB NPO status due to intubation 12/12.  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 Report/Treatments/Miscellaneous: intubated, patient seen with son, son reports patient with great appetite prior to admit with intake of 3 meals daily plus snack, spoke with RN, tolerating tube feed   GI Status: BM 12/19  Pertinent Labs: Sodium 145, Potassium 3.3, BUN 75, Creatinine 2.9, Glucose 136  Pertinent Meds: colace, lasix, lantus, humalog, maxipime, glycolax, diprivan, electrolyte replacement       Wound Type: Surgical Incision (antibiotic spacer and revision hip arthroplasty; perineum stage II, buttock stage I and II, coccyx stage II)   
Comprehensive Nutrition Assessment    Type and Reason for Visit:  Reassess (TF monitoring)    Nutrition Recommendations/Plan:   Continue Nepro at goal of 35ml/hour  Additional free H20 per Physician  Monitoring diprivan, labs, GI status for nutrition adjustments as appropriate     Malnutrition Assessment:  Malnutrition Status:  Insufficient data (12/15/23 1436)    Context:  Acute Illness     Findings of the 6 clinical characteristics of malnutrition:  Energy Intake:  Unable to assess (intubated)  Weight Loss:  Unable to assess (no weight records per EMR)     Body Fat Loss:  Mild body fat loss Orbital   Muscle Mass Loss:  Mild muscle mass loss Temples (temporalis)  Fluid Accumulation:  Mild     Strength:  Not Performed    Nutrition Assessment:      Pt. nutritionally compromised AEB NPO status due to intubation 12/12.  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 Report/Treatments/Miscellaneous: intubated, tube feed infusing at goal and tolerating per RN, had BM last evening and receiving bowel meds, note physical finding of malnutrition but may be age related (no family in room this morning to ask regarding po intake PTA and usual body weight)  GI Status: BM x 1 on 12/17  Pertinent Labs: Sodium 143, Potassium 3.6, BUN 71, Creatinine 2.6, glucose 153  Pertinent Meds: colace, lasix, lantus, humalog, zosyn, glycolax, diprivan      Wound Type: Surgical Incision (antibiotic spacer and revision hip arthroplasty; perineum stage II, buttock stage I and II, coccyx stage II)       Current Nutrition Intake & Therapies:          Diet NPO  ADULT TUBE FEEDING; Orogastric; Renal Formula; 
Comprehensive Nutrition Assessment    Type and Reason for Visit:  Reassess (TF monitoring)    Nutrition Recommendations/Plan:   Continue Nepro at goal of 35ml/hour  Additional free H20 per Physician  Monitoring diprivan, labs, GI status for nutrition adjustments as appropriate  Spoke with Yuko DOLAN - added colace today - no BM x4d     Malnutrition Assessment:  Malnutrition Status:  Insufficient data (12/15/23 1436)    Context:  Acute Illness     Findings of the 6 clinical characteristics of malnutrition:  Energy Intake:  Unable to assess (intubated)  Weight Loss:  Unable to assess (no weight records per EMR)     Body Fat Loss:  Mild body fat loss Orbital   Muscle Mass Loss:  Mild muscle mass loss Temples (temporalis)  Fluid Accumulation:  Mild     Strength:  Not Performed    Nutrition Assessment:     Pt. nutritionally compromised AEB NPO status due to intubation 12/12.  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 Report/Treatments/Miscellaneous: pt. Seen; intubated; tolerating TF at goal; UO 1510ml; spoke with Yuko DOLAN re: bowel meds as no BM x4d - colace added daily this pm - has been receiving glycolax daily; no family in room this am - ? Po pta and UBW - physical findings of malnutrition may be age related  GI Status: BM x1 12/10 and 12/11  Pertinent Labs: glucose 138  BUN 81  creatinine 2.5  Na 143  K+ 3.5  triglycerides 151  Pertinent Meds: diprivan, ATB, lantus, humalog, glycolax, levophed    Wound Type: Surgical Incision (antibiotic spacer and revision hip arthroplasty; perineum stage II, buttock stage I and II, coccyx stage II)       Current Nutrition Intake & 
Comprehensive Nutrition Assessment    Type and Reason for Visit:  Reassess (TF monitoring)    Nutrition Recommendations/Plan:   SLP swallow evaluation pending once pt. More alert  If unable to safely take po diet recommend NGT when able to place with heparin gtt and resuming TF - Nepro 15ml/hour increase 10ml every 4h as tolerated to goal of 45ml/hour to provide 1912 kcals, 87gms protein, 173gms cho, 27gms fiber, 785ml free H20 in 1080ml total volume/24h  If able to take po diet will  add ONS as appropriate for diet order/texture     Malnutrition Assessment:  Malnutrition Status:  At risk for malnutrition (Comment) (12/20/23 6998)    Context:  Acute Illness     Findings of the 6 clinical characteristics of malnutrition:  Energy Intake:  No significant decrease in energy intake (receiving tube feed at goal; son reports patient with great appetite/ intake PTA)  Weight Loss:  Unable to assess (no weight records per EMR)     Body Fat Loss:   (question related to advanced age) Orbital   Muscle Mass Loss:   (question related to advanced age) Temples (temporalis)  Fluid Accumulation:  Mild     Strength:  Not Performed    Nutrition Assessment:     Pt. nutritionally compromised AEB NPO status due to intubation 12/12 - extubation 12/21 but need for SLP evaluation and too lethargic so far.  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 Report/Treatments/Miscellaneous: pt. Seen; on HFNC; spoke with RN; lethargic this am - plan SLP evaulation as status allows possibly this pm after sedation clears further; no NGT for now d/t heparin gtt per RN; extubated 12/21; was tolerating TF prior to 
Comprehensive Nutrition Assessment    Type and Reason for Visit:  Reassess (TF)    Nutrition Recommendations/Plan:   Continue current TF regimen: Nepro at 45 mL/hr.   Additional water flushes per provider - currently at 150 mL q 4 hours  Continue bowel meds and increase prn to motivate BM - last BM 12/25  Continue NPO per SLP until potential re-eval with NGT removed  Will monitor need for additional nutrition interventions.      Malnutrition Assessment:  Malnutrition Status:  At risk for malnutrition (Comment) (12/20/23 9898)    Context:  Acute Illness     Findings of the 6 clinical characteristics of malnutrition:  Energy Intake:  No significant decrease in energy intake (receiving tube feed at goal; son reports patient with great appetite/ intake PTA)  Weight Loss:  Unable to assess (no weight records per EMR)     Body Fat Loss:   (question related to advanced age) Orbital   Muscle Mass Loss:   (question related to advanced age) Temples (temporalis)  Fluid Accumulation:  Mild     Strength:  Not Performed    Nutrition Assessment:     Pt. nutritionally compromised AEB NPO status due to intubation 12/12 - extubation 12/21 and failed MBS 12/29 with SLP recommending strict NPO.  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 Report/Treatments/Miscellaneous: Pt seen NGT in place, about to go down for MBS. Spoke with PCA - pt tolerating TF okay and seems to be doing well with some increased alertness. SLP was unable to work with pt yesterday as pt was on HFNC, and suggested if failed MBS today that consideration for G tube to be placed. Pt failed MBS, SLP recommending continued strict 
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  Patient had antibiotic spacer placed after prosthetic joint infection presented to [facility] for revision of the arthroplasty on 2023.  Patient experienced postoperative hypoxia necessitating high flow nasal cannula and was unable to wean appropriately.  Based on this, patient was transferred to [facility].  Patient has noticed on his proxy decision-maker should he be unable to make decisions himself.  Rapid response was called on  at 0006 a.m.  Patient desaturated despite being on nonrebreather mask and was severely agitated.  Patient was placed on 100% FiO2 60 L high flow nasal cannula with improvement in oxygenation status.  Discussed with patient's the risks and benefits of intubation.  Patient was alert and oriented x 3, voiced understanding, and stated consent for this procedure.  Patient was transferred to ICU for intubation and further management and monitoring. intubated     Restrictions/Precautions:  Restrictions/Precautions: Fall Risk, Weight Bearing  Right Lower Extremity Weight Bearing: Weight Bearing As Tolerated  Position Activity Restriction  Hip Precautions: No hip flexion > 90 degrees, No ADduction, No hip internal rotation  Other 
[facility]  PHYSICAL THERAPY MISSED TREATMENT NOTE  [facility] ICU 4D    Date: 2023  Patient Name: Neftali Hazel        MRN: [id number]   : 1935  (88 y.o.)  Gender: male   Referring Practitioner: Araceli Ghotra MD  Diagnosis: Acute Hypoxic respiratory failure         REASON FOR MISSED TREATMENT:  Pt was extubated yesterday pt on cpap however was given ativan and not following commands, will check back this afternoon. This afternoon pt on high flow however not following commands, will check back again for therapy next available date     .              
Hospice called and aware of new consult placed .   
Hospice referral completed with POA/son Neftali Hazel Jr. Patient resting in bed with no s/s of distress or discomfort during referral. Hospice concepts, philosophies, and services explained. Discussed different level of hospice care and criteria for each with review of patient case with Neftali meeting for inpatient hospice level of care. Neftali Al agreeable to transition to this level of care.   Son shared patients decline over last 6 months with recent divorce. Stories of patient life and personality shared. Support provided.   Patient with need of 14 mg morphine IV push and 1 mg ativan IV push in last 24 hrs. At this time uncertain patient medication need for comfort with continued decline with being unresponsive at this time. Patient care needs for symptom evaluation and administration of medication unable to be done in other setting as requiring IV for fast reacting medication for sudden onset of symptoms.   Dr. Jaramillo completed orders for transition to [facility]. This nurse assisted with discharge readmit.   
[facility]   Nephrology progress note  1/1/2024, 12:41 PM      Pt Name:    Neftali Hazel  MRN:     [id number]     YOB: 1935  Admit Date:    12/8/2023  1:46 PM    Chief Complaint: Nephrology following for acute kidney injury.    Subjective:  Patient seen and examined this morning.  Had worsening respiratory status overnight placed on HFNC, CXR showed worsened pulmonary edema, also needed blood transfusion.      Objective:  24HR INTAKE/OUTPUT:    Intake/Output Summary (Last 24 hours) at 1/1/2024 1241  Last data filed at 1/1/2024 0458  Gross per 24 hour   Intake 1460 ml   Output 1240 ml   Net 220 ml      Admission weight: 84.4 kg (186 lb 1.1 oz)  Wt Readings from Last 3 Encounters:   12/31/23 83.4 kg (183 lb 13.8 oz)        Vitals :   Vitals:    01/01/24 1019 01/01/24 1045 01/01/24 1122 01/01/24 1130   BP: (!) 153/60 (!) 146/65  (!) 141/64   Pulse: 100 (!) 102  (!) 108   Resp: 22 20 16   Temp: 98.3 °F (36.8 °C)      TempSrc: Oral      SpO2: 95%  97%    Weight:       Height:           Physical examination  General Appearance: Ill-appearing, no distress  Mouth/Throat:  +NG tube  Neck: No accessory muscle use  Lungs:  crackles noted B/L  Heart::  S1,S2 heard  Abdomen:  Soft, non - tender  Musculoskeletal:  Edema -mainly right leg edema    Medications:  Infusion:    sodium chloride      [Held by provider] heparin (PORCINE) Infusion Stopped (01/01/24 1031)    dextrose       Meds:    [Held by provider] bisacodyl  10 mg Rectal Daily    [Held by provider] sennosides-docusate sodium  1 tablet Per NG tube BID    [Held by provider] polyethylene glycol  17 g Oral BID    bumetanide  1 mg IntraVENous BID    [Held by provider] diclofenac sodium  2 g Topical BID    QUEtiapine  25 mg Oral Nightly    [Held by provider] cloNIDine  0.1 mg Oral q8h    [Held by provider] sodium chloride (Inhalant)  4 mL Nebulization BID    [Held by provider] lansoprazole  30 mg Orogastric QAM AC    [Held by provider] 
[facility]   Nephrology progress note  1/2/2024, 9:49 AM      Pt Name:    Neftali Hazel  MRN:     [id number]     YOB: 1935  Admit Date:    12/8/2023  1:46 PM    Chief Complaint: Nephrology following for acute kidney injury.    Subjective:  Patient seen and examined this morning  Not much responsive to verbal commands  Urine output okay    Objective:  24HR INTAKE/OUTPUT:    Intake/Output Summary (Last 24 hours) at 1/2/2024 0949  Last data filed at 1/2/2024 0331  Gross per 24 hour   Intake 202 ml   Output 1850 ml   Net -1648 ml        Admission weight: 84.4 kg (186 lb 1.1 oz)  Wt Readings from Last 3 Encounters:   12/31/23 83.4 kg (183 lb 13.8 oz)        Vitals :   Vitals:    01/01/24 2040 01/02/24 0009 01/02/24 0413 01/02/24 0627   BP:       Pulse:       Resp: 22 22 20 22   Temp:       TempSrc:       SpO2:       Weight:       Height:           Physical examination  General Appearance: Ill-appearing, no distress  Mouth/Throat:  Oral mucosa dry  Neck: No accessory muscle use  Lungs:  Breath sounds: Diminished  Heart::  S1,S2 heard  Abdomen:  Soft, non - tender  CNS not responding to verbal command    Medications:  Infusion:    sodium chloride      [Held by provider] heparin (PORCINE) Infusion Stopped (01/01/24 1031)    dextrose       Meds:    [Held by provider] bisacodyl  10 mg Rectal Daily    [Held by provider] sennosides-docusate sodium  1 tablet Per NG tube BID    [Held by provider] polyethylene glycol  17 g Oral BID    [Held by provider] diclofenac sodium  2 g Topical BID    QUEtiapine  25 mg Oral Nightly    [Held by provider] cloNIDine  0.1 mg Oral q8h    [Held by provider] sodium chloride (Inhalant)  4 mL Nebulization BID    [Held by provider] lansoprazole  30 mg Orogastric QAM AC    [Held by provider] tiotropium-olodaterol  4 puff Inhalation Daily    [Held by provider] gabapentin  300 mg Oral BID    [Held by provider] insulin glargine  4 Units SubCUTAneous QAM    [Held by 
[facility]   Nephrology progress note  12/11/2023, 10:22 AM      Pt Name:    Neftali Hazel  MRN:     [id number]     YOB: 1935  Admit Date:    12/8/2023  1:46 PM    Chief Complaint: Nephrology following for acute kidney injury.    Subjective:  Patient seen and examined  No chest pain.  Still short of breath but states it is the same as before  Doubt he is eating and drinking anything much  Good urine output    Objective:  24HR INTAKE/OUTPUT:    Intake/Output Summary (Last 24 hours) at 12/11/2023 1022  Last data filed at 12/11/2023 0230  Gross per 24 hour   Intake 950 ml   Output 1545 ml   Net -595 ml      Admission weight: 84.4 kg (186 lb 1.1 oz)  Wt Readings from Last 3 Encounters:   12/11/23 80.5 kg (177 lb 7.5 oz)        Vitals :   Vitals:    12/11/23 0031 12/11/23 0230 12/11/23 0813 12/11/23 0848   BP:  (!) 146/68 (!) 153/89    Pulse: 74 79 80    Resp: 24 30 28    Temp:  98.2 °F (36.8 °C) 98.8 °F (37.1 °C)    TempSrc:  Oral Oral    SpO2: 99% 94% 92% 92%   Weight:  80.5 kg (177 lb 7.5 oz)     Height:           Physical examination  General Appearance:  Well developed. No distress  Mouth/Throat:  Oral mucosa moist  Neck:  Supple, no JVD  Lungs:  Breath sounds: Diminished  Heart::  S1,S2 heard  Abdomen:  Soft, non - tender  Musculoskeletal:  Edema -no edema    Medications:  Infusion:    heparin (PORCINE) Infusion Stopped (12/11/23 0719)    dextrose       Meds:    polyethylene glycol  17 g Oral Daily    gabapentin  300 mg Oral Daily    cefTRIAXone (ROCEPHIN) IV  1,000 mg IntraVENous Q24H    doxycycline (VIBRAMYCIN) IV  100 mg IntraVENous Q12H    insulin glargine  4 Units SubCUTAneous QAM    pravastatin  10 mg Oral Nightly    metoprolol succinate  50 mg Oral Daily    insulin lispro  0-4 Units SubCUTAneous TID WC    insulin lispro  0-4 Units SubCUTAneous Nightly    ipratropium 0.5 mg-albuterol 2.5 mg  1 Dose Inhalation Q4H WA RT    pantoprazole  40 mg Oral QAM AC    amLODIPine  10 mg 
[facility]   Nephrology progress note  12/13/2023, 12:19 PM      Pt Name:    Neftali Hazel  MRN:     [id number]     YOB: 1935  Admit Date:    12/8/2023  1:46 PM    Chief Complaint: Nephrology following for acute kidney injury.    Subjective:  Patient seen and examined this morning  Not requiring any pressors  Intubated cannot accurately assess history or review of systems  Decent urine output noted    Objective:  24HR INTAKE/OUTPUT:    Intake/Output Summary (Last 24 hours) at 12/13/2023 1219  Last data filed at 12/13/2023 1111  Gross per 24 hour   Intake 919.38 ml   Output 1415 ml   Net -495.62 ml        Admission weight: 84.4 kg (186 lb 1.1 oz)  Wt Readings from Last 3 Encounters:   12/13/23 77.1 kg (169 lb 15.6 oz)        Vitals :   Vitals:    12/13/23 0930 12/13/23 1000 12/13/23 1004 12/13/23 1100   BP:  (!) 151/48  (!) 157/51   Pulse: (!) 49 60 50 (!) 47   Resp: 18 24 22 19   Temp:    (!) 96.6 °F (35.9 °C)   TempSrc:    Bladder   SpO2: 100% 91% 95% 97%   Weight:       Height:           Physical examination  General Appearance:  Well developed. No distress  Mouth/Throat:  Oral mucosa moist  Neck:  Supple, no JVD  Lungs:  Breath sounds: Diminished  Heart::  S1,S2 heard  Abdomen:  Soft, non - tender  Musculoskeletal:  Edema -no edema    Medications:  Infusion:    dexmedetomidine      propofol      heparin (PORCINE) Infusion 21 Units/kg/hr (12/13/23 0221)    dextrose       Meds:    tiotropium-olodaterol  2 puff Inhalation Daily    cefTRIAXone (ROCEPHIN) IV  2,000 mg IntraVENous Q24H    polyethylene glycol  17 g Oral Daily    gabapentin  300 mg Oral Daily    insulin glargine  4 Units SubCUTAneous QAM    pravastatin  10 mg Oral Nightly    metoprolol succinate  50 mg Oral Daily    insulin lispro  0-4 Units SubCUTAneous TID WC    insulin lispro  0-4 Units SubCUTAneous Nightly    pantoprazole  40 mg Oral QAM AC    amLODIPine  10 mg Oral Daily    [Held by provider] furosemide  20 mg Oral 
[facility]   Nephrology progress note  12/14/2023, 8:29 AM      Pt Name:    Neftali Hazel  MRN:     [id number]     YOB: 1935  Admit Date:    12/8/2023  1:46 PM    Chief Complaint: Nephrology following for acute kidney injury.    Subjective:  Patient seen and examined this morning  Not requiring any pressors  Intubated cannot accurately assess history or review of systems  Currently only on 25% FiO2  Excellent urine output of 2.3 L    Objective:  24HR INTAKE/OUTPUT:    Intake/Output Summary (Last 24 hours) at 12/14/2023 0829  Last data filed at 12/14/2023 0749  Gross per 24 hour   Intake 2367.21 ml   Output 2050 ml   Net 317.21 ml        Admission weight: 84.4 kg (186 lb 1.1 oz)  Wt Readings from Last 3 Encounters:   12/14/23 77 kg (169 lb 12.1 oz)        Vitals :   Vitals:    12/14/23 0700 12/14/23 0715 12/14/23 0730 12/14/23 0825   BP: (!) 157/42 (!) 167/44 (!) 158/43    Pulse: 50 (!) 49 52 52   Resp: 25 25 29 22   Temp:   97 °F (36.1 °C)    TempSrc:   Bladder    SpO2: 93% 93% 91% 95%   Weight:       Height:           Physical examination  General Appearance:  Well developed. No distress  Mouth/Throat:  Oral mucosa moist  Neck:  Supple, no JVD  Lungs:  Breath sounds: Diminished  Heart::  S1,S2 heard  Abdomen:  Soft, non - tender  Musculoskeletal:  Edema -no edema    Medications:  Infusion:    dexmedetomidine 0.3 mcg/kg/hr (12/14/23 0620)    propofol 30 mcg/kg/min (12/14/23 0745)    sodium chloride 50 mL/hr at 12/14/23 0620    heparin (PORCINE) Infusion 16 Units/kg/hr (12/14/23 0620)    dextrose       Meds:    tiotropium-olodaterol  4 puff Inhalation Daily    cefTRIAXone (ROCEPHIN) IV  2,000 mg IntraVENous Q24H    polyethylene glycol  17 g Oral Daily    gabapentin  300 mg Oral Daily    insulin glargine  4 Units SubCUTAneous QAM    pravastatin  10 mg Oral Nightly    insulin lispro  0-4 Units SubCUTAneous TID WC    insulin lispro  0-4 Units SubCUTAneous Nightly    pantoprazole  40 mg 
[facility]   Nephrology progress note  12/15/2023, 12:15 PM      Pt Name:    Neftali Hazel  MRN:     [id number]     YOB: 1935  Admit Date:    12/8/2023  1:46 PM    Chief Complaint: Nephrology following for acute kidney injury.    Subjective:  Patient seen and examined this morning  Not requiring any pressors  Intubated cannot accurately assess history or review of systems  Currently only on 30% FiO2  Excellent urine output of 2.3 L    Objective:  24HR INTAKE/OUTPUT:    Intake/Output Summary (Last 24 hours) at 12/15/2023 1215  Last data filed at 12/15/2023 1043  Gross per 24 hour   Intake 2992.34 ml   Output 1860 ml   Net 1132.34 ml        Admission weight: 84.4 kg (186 lb 1.1 oz)  Wt Readings from Last 3 Encounters:   12/15/23 76.8 kg (169 lb 5 oz)        Vitals :   Vitals:    12/15/23 1015 12/15/23 1030 12/15/23 1045 12/15/23 1103   BP: (!) 168/45 (!) 145/59 (!) 166/68    Pulse: 84 78 65 68   Resp: 22 21 20 20   Temp:   99.5 °F (37.5 °C)    TempSrc:   Core    SpO2: 98% 98% 98% 98%   Weight:       Height:           Physical examination  General Appearance:  Well developed. No distress  Mouth/Throat:  Oral mucosa moist  Neck:  Supple, no JVD  Lungs:  Breath sounds: Diminished  Heart::  S1,S2 heard  Abdomen:  Soft, non - tender  Musculoskeletal:  Edema -no edema    Medications:  Infusion:    norepinephrine Stopped (12/15/23 0617)    propofol 35 mcg/kg/min (12/15/23 0637)    dexmedetomidine Stopped (12/14/23 1249)    sodium chloride 50 mL/hr at 12/15/23 1014    heparin (PORCINE) Infusion 18 Units/kg/hr (12/15/23 0637)    dextrose       Meds:    lansoprazole  30 mg Orogastric QAM AC    tiotropium-olodaterol  4 puff Inhalation Daily    gabapentin  300 mg Oral BID    cefTRIAXone (ROCEPHIN) IV  2,000 mg IntraVENous Q24H    polyethylene glycol  17 g Oral Daily    insulin glargine  4 Units SubCUTAneous QAM    pravastatin  10 mg Oral Nightly    insulin lispro  0-4 Units SubCUTAneous TID WC 
[facility]   Nephrology progress note  12/16/2023, 11:33 AM      Pt Name:    Neftali Hazel  MRN:     [id number]     YOB: 1935  Admit Date:    12/8/2023  1:46 PM    Chief Complaint: Nephrology following for acute kidney injury.    Subjective:  Patient seen and examined this morning  Not requiring any pressors  Clinically edematous  On 30% FiO2  On mechanical ventilator  On IV heparin      Objective:  24HR INTAKE/OUTPUT:    Intake/Output Summary (Last 24 hours) at 12/16/2023 1133  Last data filed at 12/16/2023 0815  Gross per 24 hour   Intake 2493.8 ml   Output 1080 ml   Net 1413.8 ml        Admission weight: 84.4 kg (186 lb 1.1 oz)  Wt Readings from Last 3 Encounters:   12/16/23 78.1 kg (172 lb 2.9 oz)        Vitals :   Vitals:    12/16/23 0800 12/16/23 0900 12/16/23 1000 12/16/23 1100   BP: (!) 136/26 (!) 156/31 (!) 146/28 (!) 148/33   Pulse: 64 73 66 68   Resp: 20 27 20 24   Temp: 97.7 °F (36.5 °C)      TempSrc: Bladder      SpO2: 97% 97% 98% 97%   Weight:       Height:           Physical examination  General Appearance: Ill-appearing but no distress, sedated, generalized pallor appearance  Mouth/Throat:  + ET tube present on  Neck:  Supple, no JVD  Lungs: On ventilator, + breath sounds  Heart::  S1,S2 heard  Abdomen:  Soft, non - tender  Musculoskeletal: Upper extremity edema noted    Medications:  Infusion:    norepinephrine Stopped (12/15/23 0617)    propofol 40 mcg/kg/min (12/16/23 0815)    dexmedetomidine Stopped (12/14/23 1249)    heparin (PORCINE) Infusion 18 Units/kg/hr (12/16/23 0815)    dextrose       Meds:    potassium chloride  10 mEq IntraVENous Q1H    potassium chloride  10 mEq IntraVENous Once    lansoprazole  30 mg Orogastric QAM AC    docusate  100 mg Per NG tube Daily    tiotropium-olodaterol  4 puff Inhalation Daily    gabapentin  300 mg Oral BID    cefTRIAXone (ROCEPHIN) IV  2,000 mg IntraVENous Q24H    polyethylene glycol  17 g Oral Daily    insulin glargine  
[facility]   Nephrology progress note  12/17/2023, 11:18 AM      Pt Name:    Neftali Hazel  MRN:     [id number]     YOB: 1935  Admit Date:    12/8/2023  1:46 PM    Chief Complaint: Nephrology following for acute kidney injury.    Subjective:  Patient seen and examined this morning  Not requiring any pressors  Clinically edematous  On 25% FiO2  On mechanical ventilator  On IV heparin      Objective:  24HR INTAKE/OUTPUT:    Intake/Output Summary (Last 24 hours) at 12/17/2023 1118  Last data filed at 12/17/2023 0708  Gross per 24 hour   Intake 2013.84 ml   Output 2100 ml   Net -86.16 ml        Admission weight: 84.4 kg (186 lb 1.1 oz)  Wt Readings from Last 3 Encounters:   12/17/23 79 kg (174 lb 2.6 oz)        Vitals :   Vitals:    12/17/23 0800 12/17/23 0830 12/17/23 0900 12/17/23 0930   BP: (!) 150/36 (!) 146/33 (!) 158/39 (!) 139/31   Pulse: 71 73 70 68   Resp: 22 19 22 24   Temp: 99.3 °F (37.4 °C)      TempSrc: Bladder      SpO2: 94% 93% 94% 94%   Weight:       Height:           Physical examination  General Appearance: Ill-appearing but no distress, sedated, generalized pallor appearance  Mouth/Throat:  + ET tube present on  Neck:  Supple, no JVD  Lungs: On ventilator, + breath sounds  Heart::  S1,S2 heard  Abdomen:  Soft, non - tender  Musculoskeletal: Upper extremity edema noted    Medications:  Infusion:    norepinephrine Stopped (12/15/23 0617)    propofol 30 mcg/kg/min (12/17/23 0628)    dexmedetomidine Stopped (12/14/23 1249)    heparin (PORCINE) Infusion 21 Units/kg/hr (12/17/23 0901)    dextrose       Meds:    furosemide  20 mg IntraVENous Daily    lansoprazole  30 mg Orogastric QAM AC    docusate  100 mg Per NG tube Daily    tiotropium-olodaterol  4 puff Inhalation Daily    gabapentin  300 mg Oral BID    polyethylene glycol  17 g Oral Daily    insulin glargine  4 Units SubCUTAneous QAM    pravastatin  10 mg Oral Nightly    insulin lispro  0-4 Units SubCUTAneous TID WC    
[facility]   Nephrology progress note  12/18/2023, 9:47 AM      Pt Name:    Neftali Hazel  MRN:     [id number]     YOB: 1935  Admit Date:    12/8/2023  1:46 PM    Chief Complaint: Nephrology following for acute kidney injury.    Subjective:  Patient seen and examined this morning  Not requiring any pressors  Intubated cannot accurately assess history or review of systems  Currently only on 25% FiO2  UOP is ok.    Objective:  24HR INTAKE/OUTPUT:    Intake/Output Summary (Last 24 hours) at 12/18/2023 0947  Last data filed at 12/18/2023 0701  Gross per 24 hour   Intake 1844.04 ml   Output 1525 ml   Net 319.04 ml        Admission weight: 84.4 kg (186 lb 1.1 oz)  Wt Readings from Last 3 Encounters:   12/17/23 79 kg (174 lb 2.6 oz)        Vitals :   Vitals:    12/18/23 0700 12/18/23 0730 12/18/23 0749 12/18/23 0751   BP: (!) 149/40      Pulse: 82  90 91   Resp: 22  22 22   Temp:  99.5 °F (37.5 °C)     TempSrc:  Bladder     SpO2: 93%  92% 92%   Weight:       Height:           Physical examination  General Appearance:  Well developed. No distress  Mouth/Throat:  Oral mucosa moist  Neck:  Supple, no JVD  Lungs:  Breath sounds: Diminished  Heart::  S1,S2 heard  Abdomen:  Soft, non - tender  Musculoskeletal:  Edema -no edema    Medications:  Infusion:    propofol 15 mcg/kg/min (12/18/23 0852)    heparin (PORCINE) Infusion 21 Units/kg/hr (12/18/23 0701)    dextrose       Meds:    piperacillin-tazobactam  3,375 mg IntraVENous Q8H    furosemide  20 mg IntraVENous Daily    lansoprazole  30 mg Orogastric QAM AC    docusate  100 mg Per NG tube Daily    tiotropium-olodaterol  4 puff Inhalation Daily    gabapentin  300 mg Oral BID    polyethylene glycol  17 g Oral Daily    insulin glargine  4 Units SubCUTAneous QAM    pravastatin  10 mg Oral Nightly    insulin lispro  0-4 Units SubCUTAneous TID WC    insulin lispro  0-4 Units SubCUTAneous Nightly    amLODIPine  10 mg Oral Daily    aspirin  81 mg Oral 
[facility]   Nephrology progress note  12/19/2023, 10:50 AM      Pt Name:    Neftali Hazel  MRN:     [id number]     YOB: 1935  Admit Date:    12/8/2023  1:46 PM    Chief Complaint: Nephrology following for acute kidney injury.    Subjective:  Patient seen and examined this morning  Not requiring any pressors  Intubated cannot accurately assess history or review of systems  Currently only on 30% FiO2  UOP is ok.    Objective:  24HR INTAKE/OUTPUT:    Intake/Output Summary (Last 24 hours) at 12/19/2023 1050  Last data filed at 12/19/2023 0645  Gross per 24 hour   Intake 1610.96 ml   Output 1725 ml   Net -114.04 ml        Admission weight: 84.4 kg (186 lb 1.1 oz)  Wt Readings from Last 3 Encounters:   12/19/23 79.4 kg (175 lb 0.7 oz)        Vitals :   Vitals:    12/19/23 0945 12/19/23 1000 12/19/23 1015 12/19/23 1030   BP:  (!) 186/53     Pulse: 86 91 (!) 103 95   Resp: 23 22 22 20   Temp:       TempSrc:       SpO2: 98% 96% 94% 96%   Weight:       Height:           Physical examination  General Appearance:  Well developed. No distress  Mouth/Throat:  Oral mucosa moist  Neck:  Supple, no JVD  Lungs:  Breath sounds: Diminished  Heart::  S1,S2 heard  Abdomen:  Soft, non - tender  Musculoskeletal:  Edema -no edema    Medications:  Infusion:    propofol 20 mcg/kg/min (12/19/23 0645)    heparin (PORCINE) Infusion 21 Units/kg/hr (12/19/23 0721)    dextrose       Meds:    cefepime  2,000 mg IntraVENous Once    Followed by    cefepime  1,000 mg IntraVENous Q12H    furosemide  20 mg IntraVENous Daily    lansoprazole  30 mg Orogastric QAM AC    docusate  100 mg Per NG tube Daily    tiotropium-olodaterol  4 puff Inhalation Daily    gabapentin  300 mg Oral BID    polyethylene glycol  17 g Oral Daily    insulin glargine  4 Units SubCUTAneous QAM    pravastatin  10 mg Oral Nightly    insulin lispro  0-4 Units SubCUTAneous TID WC    insulin lispro  0-4 Units SubCUTAneous Nightly    amLODIPine  10 mg 
[facility]   Nephrology progress note  12/20/2023, 10:12 AM      Pt Name:    Neftali Hazel  MRN:     [id number]     YOB: 1935  Admit Date:    12/8/2023  1:46 PM    Chief Complaint: Nephrology following for acute kidney injury.    Subjective:  Patient seen and examined this morning  Not requiring any pressors  Intubated cannot accurately assess history or review of systems  Currently only on 30% FiO2  UOP is ok.    Objective:  24HR INTAKE/OUTPUT:    Intake/Output Summary (Last 24 hours) at 12/20/2023 1012  Last data filed at 12/20/2023 0812  Gross per 24 hour   Intake 1760.27 ml   Output 1050 ml   Net 710.27 ml        Admission weight: 84.4 kg (186 lb 1.1 oz)  Wt Readings from Last 3 Encounters:   12/19/23 79.4 kg (175 lb 0.7 oz)        Vitals :   Vitals:    12/20/23 0700 12/20/23 0730 12/20/23 0821 12/20/23 0823   BP: (!) 127/29 (!) 133/36     Pulse: 68 69 84 83   Resp: 20 17 22 22   Temp:       TempSrc:       SpO2: 96% 96% 96% 96%   Weight:       Height:           Physical examination  General Appearance:  Well developed. No distress  Mouth/Throat:  Oral mucosa moist  Neck: No accessory muscle use  Lungs:  Breath sounds: Diminished  Heart::  S1,S2 heard  Abdomen:  Soft, non - tender  Musculoskeletal:  Edema -mild dependent edema noted    Medications:  Infusion:    propofol 15 mcg/kg/min (12/20/23 0902)    heparin (PORCINE) Infusion 21 Units/kg/hr (12/20/23 0722)    dextrose       Meds:    cefepime  1,000 mg IntraVENous Q12H    cloNIDine  0.1 mg Oral BID    furosemide  20 mg IntraVENous Daily    lansoprazole  30 mg Orogastric QAM AC    docusate  100 mg Per NG tube Daily    tiotropium-olodaterol  4 puff Inhalation Daily    gabapentin  300 mg Oral BID    polyethylene glycol  17 g Oral Daily    insulin glargine  4 Units SubCUTAneous QAM    pravastatin  10 mg Oral Nightly    insulin lispro  0-4 Units SubCUTAneous TID WC    insulin lispro  0-4 Units SubCUTAneous Nightly    amLODIPine  
[facility]   Nephrology progress note  12/21/2023, 10:08 AM      Pt Name:    Neftali Hazel  MRN:     [id number]     YOB: 1935  Admit Date:    12/8/2023  1:46 PM    Chief Complaint: Nephrology following for acute kidney injury.    Subjective:  Patient seen and examined this morning  Not requiring any pressors  Intubated cannot accurately assess history or review of systems  Currently only on 30% FiO2  UOP is ok.    Objective:  24HR INTAKE/OUTPUT:    Intake/Output Summary (Last 24 hours) at 12/21/2023 1008  Last data filed at 12/21/2023 0717  Gross per 24 hour   Intake 2056.92 ml   Output 1200 ml   Net 856.92 ml        Admission weight: 84.4 kg (186 lb 1.1 oz)  Wt Readings from Last 3 Encounters:   12/21/23 79.3 kg (174 lb 13.2 oz)        Vitals :   Vitals:    12/21/23 0830 12/21/23 0843 12/21/23 0845 12/21/23 0900   BP: (!) 140/44   (!) 124/35   Pulse: 68 72 72 65   Resp: 25 24 24 26   Temp:       TempSrc:       SpO2: 97% 97% 97% 97%   Weight:       Height:           Physical examination  General Appearance:  Well developed. No distress  Mouth/Throat:  Oral mucosa moist  Neck: No accessory muscle use  Lungs:  Breath sounds: Diminished  Heart::  S1,S2 heard  Abdomen:  Soft, non - tender  Musculoskeletal:  Edema -mild dependent edema noted    Medications:  Infusion:    dexmedetomidine      propofol 20 mcg/kg/min (12/21/23 0717)    heparin (PORCINE) Infusion 21 Units/kg/hr (12/21/23 0718)    dextrose       Meds:    sodium chloride (Inhalant)  4 mL Nebulization BID    potassium bicarb-citric acid  20 mEq Oral Daily    cefepime  1,000 mg IntraVENous Q12H    cloNIDine  0.1 mg Oral BID    [Held by provider] furosemide  20 mg IntraVENous Daily    lansoprazole  30 mg Orogastric QAM AC    docusate  100 mg Per NG tube Daily    tiotropium-olodaterol  4 puff Inhalation Daily    gabapentin  300 mg Oral BID    polyethylene glycol  17 g Oral Daily    insulin glargine  4 Units SubCUTAneous QAM    
[facility]   Nephrology progress note  12/22/2023, 11:29 AM      Pt Name:    Neftali Hazel  MRN:     [id number]     YOB: 1935  Admit Date:    12/8/2023  1:46 PM    Chief Complaint: Nephrology following for acute kidney injury.    Subjective:  Patient seen and examined this morning  Not requiring any pressors  Intubated cannot accurately assess history or review of systems  Currently only on 30% FiO2  UOP is ok.    Objective:  24HR INTAKE/OUTPUT:    Intake/Output Summary (Last 24 hours) at 12/22/2023 1129  Last data filed at 12/22/2023 1113  Gross per 24 hour   Intake 1057.49 ml   Output 1175 ml   Net -117.51 ml        Admission weight: 84.4 kg (186 lb 1.1 oz)  Wt Readings from Last 3 Encounters:   12/21/23 79.3 kg (174 lb 13.2 oz)        Vitals :   Vitals:    12/22/23 0900 12/22/23 0930 12/22/23 1000 12/22/23 1100   BP: (!) 160/54 (!) 148/54 (!) 153/50 (!) 158/42   Pulse: 92 85 88 85   Resp: (!) 33 21 21 21   Temp:       TempSrc:       SpO2: 96% 96% 96% 98%   Weight:       Height:           Physical examination  General Appearance:  Well developed. No distress  Mouth/Throat:  Oral mucosa moist  Neck: No accessory muscle use  Lungs:  Breath sounds: Diminished  Heart::  S1,S2 heard  Abdomen:  Soft, non - tender  Musculoskeletal:  Edema -mild dependent edema noted    Medications:  Infusion:    sodium chloride      heparin (PORCINE) Infusion 21 Units/kg/hr (12/22/23 1113)    dextrose       Meds:    sodium chloride (Inhalant)  4 mL Nebulization BID    potassium bicarb-citric acid  20 mEq Oral Daily    cefepime  1,000 mg IntraVENous Q12H    cloNIDine  0.1 mg Oral BID    [Held by provider] furosemide  20 mg IntraVENous Daily    lansoprazole  30 mg Orogastric QAM AC    docusate  100 mg Per NG tube Daily    tiotropium-olodaterol  4 puff Inhalation Daily    gabapentin  300 mg Oral BID    polyethylene glycol  17 g Oral Daily    insulin glargine  4 Units SubCUTAneous QAM    pravastatin  10 mg 
[facility]   Nephrology progress note  12/23/2023, 12:13 PM      Pt Name:    Neftali Hazel  MRN:     [id number]     YOB: 1935  Admit Date:    12/8/2023  1:46 PM    Chief Complaint: Nephrology following for acute kidney injury.    Subjective:  Patient seen and examined this morning  On high flow oxygen  Receiving PRBC  NG tube in place  Input and output noted    Objective:  24HR INTAKE/OUTPUT:    Intake/Output Summary (Last 24 hours) at 12/23/2023 1213  Last data filed at 12/23/2023 0953  Gross per 24 hour   Intake 816.88 ml   Output 1450 ml   Net -633.12 ml        Admission weight: 84.4 kg (186 lb 1.1 oz)  Wt Readings from Last 3 Encounters:   12/23/23 78.2 kg (172 lb 6.4 oz)        Vitals :   Vitals:    12/23/23 1044 12/23/23 1100 12/23/23 1115 12/23/23 1200   BP: (!) 173/50 (!) 134/49 (!) 149/47 (!) 142/51   Pulse: 92 86 83 86   Resp: 16 17 23 20   Temp: 98.2 °F (36.8 °C)  98.1 °F (36.7 °C)    TempSrc: Bladder  Bladder    SpO2: 97% 96% 97% 97%   Weight:       Height:           Physical examination  General Appearance:  Well developed. No distress  Mouth/Throat:  Oral mucosa moist  Neck: No accessory muscle use  Lungs:  Breath sounds: Diminished  Heart::  S1,S2 heard  Abdomen:  Soft, non - tender  Musculoskeletal:  Edema -mild dependent edema noted    Medications:  Infusion:    sodium chloride      dexmedetomidine Stopped (12/23/23 0700)    sodium chloride      [Held by provider] heparin (PORCINE) Infusion Stopped (12/23/23 0643)    dextrose       Meds:    bumetanide  1 mg IntraVENous Once    OLANZapine  7.5 mg Oral Nightly    cloNIDine  0.1 mg Oral q8h    sodium chloride (Inhalant)  4 mL Nebulization BID    potassium bicarb-citric acid  20 mEq Oral Daily    cefepime  1,000 mg IntraVENous Q12H    lansoprazole  30 mg Orogastric QAM AC    docusate  100 mg Per NG tube Daily    tiotropium-olodaterol  4 puff Inhalation Daily    gabapentin  300 mg Oral BID    polyethylene glycol  17 g 
[facility]   Nephrology progress note  12/24/2023, 12:03 PM      Pt Name:    Neftali Hazel  MRN:     [id number]     YOB: 1935  Admit Date:    12/8/2023  1:46 PM    Chief Complaint: Nephrology following for acute kidney injury.    Subjective:  Patient seen and examined this morning  S/p PRBC yesterday  NG tube in place  Input and output noted  Discussed with son    Objective:  24HR INTAKE/OUTPUT:    Intake/Output Summary (Last 24 hours) at 12/24/2023 1203  Last data filed at 12/24/2023 1121  Gross per 24 hour   Intake 1295.68 ml   Output 2150 ml   Net -854.32 ml        Admission weight: 84.4 kg (186 lb 1.1 oz)  Wt Readings from Last 3 Encounters:   12/23/23 78.2 kg (172 lb 6.4 oz)        Vitals :   Vitals:    12/24/23 0822 12/24/23 0900 12/24/23 0957 12/24/23 1100   BP: (!) 122/51 (!) 146/50  (!) 158/52   Pulse: 77 84 90 85   Resp: 15 18 20 24   Temp: 97.2 °F (36.2 °C)   97.5 °F (36.4 °C)   TempSrc: Bladder   Bladder   SpO2: 95%  95% 98%   Weight:       Height:           Physical examination  General Appearance: Ill-appearing but no distress  Mouth/Throat:  Oral mucosa moist  Neck: No accessory muscle use  Abdomen:  Soft, non - tender  Musculoskeletal:  Edema+     Medications:  Infusion:    sodium chloride      sodium chloride      dexmedetomidine Stopped (12/24/23 0117)    sodium chloride      heparin (PORCINE) Infusion 21 Units/kg/hr (12/24/23 1121)    dextrose       Meds:    OLANZapine  7.5 mg Oral Nightly    [Held by provider] cloNIDine  0.1 mg Oral q8h    sodium chloride (Inhalant)  4 mL Nebulization BID    potassium bicarb-citric acid  20 mEq Oral Daily    cefepime  1,000 mg IntraVENous Q12H    lansoprazole  30 mg Orogastric QAM AC    docusate  100 mg Per NG tube Daily    tiotropium-olodaterol  4 puff Inhalation Daily    gabapentin  300 mg Oral BID    polyethylene glycol  17 g Oral Daily    insulin glargine  4 Units SubCUTAneous QAM    pravastatin  10 mg Oral Nightly    
[facility]   Nephrology progress note  12/25/2023, 11:47 AM      Pt Name:    Neftali Hazel  MRN:     [id number]     YOB: 1935  Admit Date:    12/8/2023  1:46 PM    Chief Complaint: Nephrology following for acute kidney injury.    Subjective:  Patient seen and examined this morning  NG tube in place  Input and output noted  Discussed with son  Put out 2 L urine    Objective:  24HR INTAKE/OUTPUT:    Intake/Output Summary (Last 24 hours) at 12/25/2023 1147  Last data filed at 12/25/2023 1000  Gross per 24 hour   Intake 445.65 ml   Output 1525 ml   Net -1079.35 ml        Admission weight: 84.4 kg (186 lb 1.1 oz)  Wt Readings from Last 3 Encounters:   12/25/23 77.5 kg (170 lb 13.7 oz)        Vitals :   Vitals:    12/25/23 0900 12/25/23 1000 12/25/23 1006 12/25/23 1100   BP: (!) 151/55 (!) 141/92 (!) 141/92 (!) 143/31   Pulse: 76 95  100   Resp: 21 22  20   Temp:       TempSrc:       SpO2: 100% 94%  90%   Weight:       Height:           Physical examination  General Appearance: Ill-appearing but no distress  Mouth/Throat:  Oral mucosa moist  Neck: No accessory muscle use  Abdomen:  Soft, non - tender  Musculoskeletal:  Edema+     Medications:  Infusion:    sodium chloride      sodium chloride      dexmedetomidine Stopped (12/25/23 0441)    sodium chloride      heparin (PORCINE) Infusion 21 Units/kg/hr (12/25/23 1000)    dextrose       Meds:    OLANZapine  7.5 mg Oral Nightly    [Held by provider] cloNIDine  0.1 mg Oral q8h    sodium chloride (Inhalant)  4 mL Nebulization BID    potassium bicarb-citric acid  20 mEq Oral Daily    lansoprazole  30 mg Orogastric QAM AC    docusate  100 mg Per NG tube Daily    tiotropium-olodaterol  4 puff Inhalation Daily    gabapentin  300 mg Oral BID    polyethylene glycol  17 g Oral Daily    insulin glargine  4 Units SubCUTAneous QAM    pravastatin  10 mg Oral Nightly    insulin lispro  0-4 Units SubCUTAneous TID WC    insulin lispro  0-4 Units 
[facility]   Nephrology progress note  12/27/2023, 9:50 AM      Pt Name:    Neftali Hazel  MRN:     [id number]     YOB: 1935  Admit Date:    12/8/2023  1:46 PM    Chief Complaint: Nephrology following for acute kidney injury.    Subjective:  Patient seen and examined this morning  He is extubated currently awake and alert slightly confused  Making decent urine    Objective:  24HR INTAKE/OUTPUT:    Intake/Output Summary (Last 24 hours) at 12/27/2023 0950  Last data filed at 12/27/2023 0540  Gross per 24 hour   Intake 1045 ml   Output 1175 ml   Net -130 ml        Admission weight: 84.4 kg (186 lb 1.1 oz)  Wt Readings from Last 3 Encounters:   12/27/23 80.3 kg (177 lb 0.5 oz)        Vitals :   Vitals:    12/27/23 0600 12/27/23 0700 12/27/23 0800 12/27/23 0826   BP: (!) 140/42 (!) 155/41     Pulse: 73 79     Resp: 18 15     Temp:   98.4 °F (36.9 °C)    TempSrc:       SpO2: 100% 100%  96%   Weight:       Height:           Physical examination  General Appearance:  Well developed. No distress  Mouth/Throat:  Oral mucosa moist  Neck: No accessory muscle use  Lungs:  Breath sounds: Diminished  Heart::  S1,S2 heard  Abdomen:  Soft, non - tender  Musculoskeletal:  Edema -mild dependent edema noted    Medications:  Infusion:    sodium chloride      sodium chloride      sodium chloride      heparin (PORCINE) Infusion 21 Units/kg/hr (12/26/23 2214)    dextrose       Meds:    OLANZapine  7.5 mg Oral Nightly    cloNIDine  0.1 mg Oral q8h    sodium chloride (Inhalant)  4 mL Nebulization BID    potassium bicarb-citric acid  20 mEq Oral Daily    lansoprazole  30 mg Orogastric QAM AC    docusate  100 mg Per NG tube Daily    tiotropium-olodaterol  4 puff Inhalation Daily    [Held by provider] gabapentin  300 mg Oral BID    polyethylene glycol  17 g Oral Daily    insulin glargine  4 Units SubCUTAneous QAM    pravastatin  10 mg Oral Nightly    insulin lispro  0-4 Units SubCUTAneous TID WC    insulin 
[facility]   Nephrology progress note  12/28/2023, 9:02 AM      Pt Name:    Neftali Hazel  MRN:     [id number]     YOB: 1935  Admit Date:    12/8/2023  1:46 PM    Chief Complaint: Nephrology following for acute kidney injury.    Subjective:  Patient seen and examined this morning  He is extubated currently awake and alert slightly confused  Making decent urine, currently on high flow nasal cannula    Objective:  24HR INTAKE/OUTPUT:    Intake/Output Summary (Last 24 hours) at 12/28/2023 0902  Last data filed at 12/28/2023 0600  Gross per 24 hour   Intake 2865.37 ml   Output 1295 ml   Net 1570.37 ml        Admission weight: 84.4 kg (186 lb 1.1 oz)  Wt Readings from Last 3 Encounters:   12/28/23 82 kg (180 lb 12.4 oz)        Vitals :   Vitals:    12/28/23 0650 12/28/23 0757 12/28/23 0758 12/28/23 0815   BP: (!) 154/43   (!) 154/55   Pulse: 87 94 (!) 101 (!) 104   Resp: 22 24 25 22   Temp:    100 °F (37.8 °C)   TempSrc:    Bladder   SpO2: 97% 90% 94% 93%   Weight:       Height:           Physical examination  General Appearance: Ill-appearing, no distress  Mouth/Throat:  Oral mucosa moist  Neck: No accessory muscle use  Lungs:  Breath sounds: Diminished  Heart::  S1,S2 heard  Abdomen:  Soft, non - tender  Musculoskeletal:  Edema -mild dependent edema noted    Medications:  Infusion:    sodium chloride      heparin (PORCINE) Infusion 21 Units/kg/hr (12/28/23 0529)    dextrose       Meds:    sennosides-docusate sodium  1 tablet Per NG tube BID    polyethylene glycol  17 g Oral BID    QUEtiapine  25 mg Oral Nightly    cloNIDine  0.1 mg Oral q8h    sodium chloride (Inhalant)  4 mL Nebulization BID    potassium bicarb-citric acid  20 mEq Oral Daily    lansoprazole  30 mg Orogastric QAM AC    tiotropium-olodaterol  4 puff Inhalation Daily    gabapentin  300 mg Oral BID    insulin glargine  4 Units SubCUTAneous QAM    pravastatin  10 mg Oral Nightly    insulin lispro  0-4 Units SubCUTAneous 
[facility]   Nephrology progress note  12/29/2023, 10:05 AM      Pt Name:    Neftali Hazel  MRN:     [id number]     YOB: 1935  Admit Date:    12/8/2023  1:46 PM    Chief Complaint: Nephrology following for acute kidney injury.    Subjective:  Patient seen and examined this morning  currently awake and alert slightly confused  Making decent urine, currently on high flow nasal cannula    Objective:  24HR INTAKE/OUTPUT:    Intake/Output Summary (Last 24 hours) at 12/29/2023 1005  Last data filed at 12/29/2023 0739  Gross per 24 hour   Intake 2191.49 ml   Output 2222 ml   Net -30.51 ml        Admission weight: 84.4 kg (186 lb 1.1 oz)  Wt Readings from Last 3 Encounters:   12/29/23 79.1 kg (174 lb 6.1 oz)        Vitals :   Vitals:    12/29/23 0752 12/29/23 0800 12/29/23 0807 12/29/23 0900   BP:  (!) 155/81  (!) 176/58   Pulse: 89 87 91 89   Resp: 21 18 23 24   Temp:    99.5 °F (37.5 °C)   TempSrc:       SpO2: 94% 99% 94% 100%   Weight:       Height:           Physical examination  General Appearance: Ill-appearing, no distress  Mouth/Throat:  Oral mucosa moist  Neck: No accessory muscle use  Lungs:  Breath sounds: Diminished  Heart::  S1,S2 heard  Abdomen:  Soft, non - tender  Musculoskeletal:  Edema -mild dependent edema noted    Medications:  Infusion:    sodium chloride      heparin (PORCINE) Infusion 21 Units/kg/hr (12/29/23 0926)    dextrose       Meds:    bisacodyl  10 mg Rectal Daily    sennosides-docusate sodium  1 tablet Per NG tube BID    polyethylene glycol  17 g Oral BID    bumetanide  1 mg IntraVENous BID    diclofenac sodium  2 g Topical BID    QUEtiapine  25 mg Oral Nightly    cloNIDine  0.1 mg Oral q8h    sodium chloride (Inhalant)  4 mL Nebulization BID    potassium bicarb-citric acid  20 mEq Oral Daily    lansoprazole  30 mg Orogastric QAM AC    tiotropium-olodaterol  4 puff Inhalation Daily    gabapentin  300 mg Oral BID    insulin glargine  4 Units SubCUTAneous QAM 
[facility]   Nephrology progress note  12/30/2023, 11:48 AM      Pt Name:    Neftali Hazel  MRN:     [id number]     YOB: 1935  Admit Date:    12/8/2023  1:46 PM    Chief Complaint: Nephrology following for acute kidney injury.    Subjective:  Patient seen and examined this morning  Good urine output. No overnight events. Sitter at bedside.  Objective:  24HR INTAKE/OUTPUT:    Intake/Output Summary (Last 24 hours) at 12/30/2023 1148  Last data filed at 12/30/2023 0600  Gross per 24 hour   Intake 2123.91 ml   Output 1418 ml   Net 705.91 ml      Admission weight: 84.4 kg (186 lb 1.1 oz)  Wt Readings from Last 3 Encounters:   12/30/23 83.2 kg (183 lb 6.8 oz)        Vitals :   Vitals:    12/30/23 0510 12/30/23 0613 12/30/23 0628 12/30/23 0800   BP:    (!) 143/34   Pulse:  85  86   Resp:  22 22 26   Temp:    100.4 °F (38 °C)   TempSrc:    Bladder   SpO2:  95%  97%   Weight: 83.2 kg (183 lb 6.8 oz)      Height:           Physical examination  General Appearance: Ill-appearing, no distress  Mouth/Throat:  +NG tube  Neck: No accessory muscle use  Lungs:  faint rales right lung base  Heart::  S1,S2 heard  Abdomen:  Soft, non - tender  Musculoskeletal:  Edema -mainly right leg edema    Medications:  Infusion:    sodium chloride      heparin (PORCINE) Infusion 21 Units/kg/hr (12/30/23 0600)    dextrose       Meds:    bisacodyl  10 mg Rectal Daily    sennosides-docusate sodium  1 tablet Per NG tube BID    polyethylene glycol  17 g Oral BID    [Held by provider] bumetanide  1 mg IntraVENous BID    diclofenac sodium  2 g Topical BID    QUEtiapine  25 mg Oral Nightly    cloNIDine  0.1 mg Oral q8h    sodium chloride (Inhalant)  4 mL Nebulization BID    lansoprazole  30 mg Orogastric QAM AC    tiotropium-olodaterol  4 puff Inhalation Daily    gabapentin  300 mg Oral BID    insulin glargine  4 Units SubCUTAneous QAM    pravastatin  10 mg Oral Nightly    insulin lispro  0-4 Units SubCUTAneous TID WC    
[facility] ICU 4D  Occupational Therapy  Daily Note  Time:   Time In: 930  Time Out: 958  Timed Code Treatment Minutes: 28 Minutes  Minutes: 28      Co-tx completed with PT due to orally intubated, requiring assist X2 to safely complete, and would not tolerate 2 separate therapy sessions.       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  Patient had antibiotic spacer placed after prosthetic joint infection presented to [facility] for revision of the arthroplasty on 2023.  Patient experienced postoperative hypoxia necessitating high flow nasal cannula and was unable to wean appropriately.  Based on this, patient was transferred to [facility].  Patient has noticed on his proxy decision-maker should he be unable to make decisions himself.  Rapid response was called on  at 0006 a.m.  Patient desaturated despite being on nonrebreather mask and was severely agitated.  Patient was placed on 100% FiO2 60 L high flow nasal cannula with improvement in oxygenation status.  Discussed with patient's the risks and benefits of intubation.  Patient was alert and oriented x 3, voiced understanding, and stated consent for this procedure.  Patient was transferred to ICU for intubation and further management and monitoring. intubated     Restrictions/Precautions:  Restrictions/Precautions: Fall Risk, Weight Bearing  Right Lower Extremity Weight Bearing: Weight 
[facility] ICU 4D  Occupational Therapy  Daily Note   CoTx with PT due to increased medical complexity, 2+ physical assist and inability to tolerate separate sessions at this time.  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  Patient had antibiotic spacer placed after prosthetic joint infection presented to [facility] for revision of the arthroplasty on 2023.  Patient experienced postoperative hypoxia necessitating high flow nasal cannula and was unable to wean appropriately.  Based on this, patient was transferred to [facility].  Patient has noticed on his proxy decision-maker should he be unable to make decisions himself.  Rapid response was called on  at 0006 a.m.  Patient desaturated despite being on nonrebreather mask and was severely agitated.  Patient was placed on 100% FiO2 60 L high flow nasal cannula with improvement in oxygenation status.  Discussed with patient's the risks and benefits of intubation.  Patient was alert and oriented x 3, voiced understanding, and stated consent for this procedure.  Patient was transferred to ICU for intubation and further management and monitoring. intubated     Restrictions/Precautions:  Restrictions/Precautions: Fall Risk, Weight Bearing  Right Lower Extremity Weight Bearing: Weight Bearing As 
[facility]  INPATIENT SPEECH THERAPY  [facility] ICU 4D  DAILY NOTE    TIME   SLP Individual Minutes  Time In: 0845  Time Out: 0853  Minutes: 8  Timed Code Treatment Minutes: 0 Minutes       Date: 2023  Patient Name: Neftali Hazel      CSN: [id number]   : 1935  (88 y.o.)  Gender: male   Referring Physician: Barney Douglas MD   Diagnosis: Acute hypoxic respiratory failure (HCC)  Precautions: Fall risk, aspiration precautions  Current Diet: NPO, NGT  Respiratory Status: Nasal Canula: 6 LPM  Swallowing Strategies:  Oral care q2h  Date of Last MBS/FEES: Not Applicable    Pain:  No pain reported.    Subjective:  RN Elizabeth with approval to proceed with session. Upon arrival, patient resting in bed with lack of spontaneous wakefulness, live sitter present. Fair participation in structured interventions, however, confusion notable with restless bx and non-sensical verbalizations. Absence for family at bedside.     Short-Term Goals:  *modification to STGs at date to reflect current dysphagia management POC   Goal 1: Patient will consume therapeutic PO trials following completion of comprehensive oral care with consistent swallow initiation to determine readiness for instrumental evaluation following prolonged intubation.  Goal 2: NEW GOAL Staff and family will exhibit return demonstration for completion of comprehensive oral care procedural analysis to maximize oral integrity and to mitigate potential bacteria colonization.   Goal 3: Monitor mentation; complete alternate cognitive linguistic evaluation should it become clinically indicated (?delirium)    Interventions:  Comprehensive oral care procedural analysis facilitated via hydrogen peroxide rinse+toothettes with all quadrants/regions approximated (verbal encouragement required). Absence for biofilm colonization, xerostomia noted. Oral care q2h encouraged.     Conservative ice chip offerings to determine readiness for an instrumental evaluation. 
[facility]   Pharmacy Pharmacokinetic Monitoring Service - Vancomycin    Consulting Provider: Dr. Ghotra   Indication: CAP  Target Concentration: Dosing based on anticipated concentration <15 mg/L due to renal impairment/insufficiency  Day of Therapy: 1 (patient previously received course of Vancomycin recently for joint infection, last dose known given 12/3/23 per [facility])   Additional Antimicrobials: Zosyn    Pertinent Laboratory Values:   Wt Readings from Last 1 Encounters:   12/09/23 81.7 kg (180 lb 1.9 oz)     Temp Readings from Last 1 Encounters:   12/09/23 98.1 °F (36.7 °C) (Oral)     Estimated Creatinine Clearance: 15 mL/min (A) (based on SCr of 3.4 mg/dL ()).  Recent Labs     12/08/23  1603 12/09/23  0336   CREATININE 3.1* 3.4*   BUN 62* 67*   WBC 10.8 9.0     Procalcitonin: 0.25    Pertinent Cultures:  Culture Date Source Results   12/6/23 ([facility]) Sputum Cx E Coli   12/8/23 PNA panel E.coli   12/8/23 Sputum Rare gram positive cocci in pairs. Rare gram negative bacilli. Rare gram positive bacilli. Rare budding yeast.    MRSA Nasal Swab: was ordered by provider, awaiting results.    Recent vancomycin administrations        No vancomycin IV orders with administrations found.            Orders not given:            vancomycin (VANCOCIN) intermittent dosing (placeholder)                  Assessment:  Date/Time Current Dose Concentration Timing of Concentration (h)   12/9/23 Last known dose 12/3/23 1250 mg @ 0918 [facility]  13.8 mcg/mL  ~6 days after last dose   Note: Serum concentrations collected for AUC dosing may appear elevated if collected in close proximity to the dose administered, this is not necessarily an indication of toxicity    Plan:  Current level is near goal ~15 mcg/mL   Confirmed with [facility] that last dose of Vancomycin received was 12/3/23 (also had doses of 1250 mg on 12/1/23 and 1500 mg on 11/28/23). [facility] pharmacist Germaine SABILLON 
[facility]   Pharmacy Pharmacokinetic Monitoring Service - Vancomycin     Neftali Hazel is a 88 y.o. male starting on vancomycin therapy for CAP. Pharmacy consulted by Dr. Ghotra for monitoring and adjustment.    Target Concentration: Dosing based on anticipated concentration <15 mg/L due to renal impairment/insufficiency    Additional Antimicrobials: Zosyn    Pertinent Laboratory Values:   Wt Readings from Last 1 Encounters:   12/08/23 84.4 kg (186 lb 1.1 oz)     Temp Readings from Last 1 Encounters:   12/08/23 98.8 °F (37.1 °C) (Oral)     SCr 2.7 at [facility] 12/7/23 0453      Recent Labs     12/08/23  1603   WBC 10.8     Pertinent Cultures:  Culture Date Source Results   12/6/23 ([facility]) Sputum Cx E Coli   MRSA Nasal Swab: not ordered. Order placed by pharmacy.      Per pharmacist (Germaine) at [facility]   Date Time Vancomycin Dose Random Level   12/3/23 0918 1250 mg    12/6/23   18.4   12/8/23   14.1           Plan:  Patient transferred from [facility]. 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 Patient then presented back to [facility] for another hip surgery when the PNA developed. She was unable to see when the vancomycin originally started but believes pt received 6 week course.   Concentration-guided dosing due to renal impairment/insufficiency  Vancomycin concentration ordered for 12/9/23 with morning labs  Pharmacy will continue to monitor patient and adjust therapy as indicated    Thank you for the consult,  Ryanne Lentz [facility]  12/8/2023 5:12 PM    
[facility]  SPEECH THERAPY MISSED TREATMENT NOTE  STRZ ICU 4D      Date: 2023  Patient Name: Neftali Hazel        MRN: [id number]    : 1935  (88 y.o.)    REASON FOR MISSED TREATMENT:  Attempted to see patient at 0846 for completion of dysphagia management interventions. HEATHER Isaac reports lack of alertness levels and not appropriate for PO intake. She reports patient did not wake during aggressive oral care. Precedex was stopped yesterday.    Recommend continued STRICT NPO and TF via NGT. Will follow up as patient is available/appropriate for interventions.     Cee Sierra MA CFY-SLP [id number]             
Neftali Hazel was extubated to BIPAP with no complications noted.  
[facility]  PHYSICAL THERAPY MISSED TREATMENT NOTE  STRZ ICU STEPDOWN TELEMETRY 4K    Date: 2023  Patient Name: Neftali Hazel        MRN: [id number]   : 1935  (88 y.o.)  Gender: male   Referring Practitioner: Araceli Ghotra MD  Diagnosis: Acute Hypoxic respiratory failure         REASON FOR MISSED TREATMENT:  Orders received for PT evaluation, but patient on high flow 02 and continues to have decrease in Sp02. RN reported they had to increased high flow 02, but patient planning for thoracentesis later today. Will plan to check back at a later time due to decreased 02 sats on high flow oxygen. Patient also with recent Right hip revision s/p spacer placement at [facility]. Perfect served Dr. Alonso who reported patient was WBAT.     JOSEFINA GarrettT    
Nutrition Note:  Due to change in medical/code status, dietitian will sign off.   RN mentions pt is now comfort care & reports tube feeds discontinued & plan is for hospice. . If nutrition intervention is required, please submit a dietitian consult. Pretty Garber, RD, LD [phone]     
Orthopaedic Progress Note      SUBJECTIVE   Mr. Hazel seen resting in bed.    Not agitated, follows direction   no adverse events overnight    OBJECTIVE      Physical    VITALS:  BP (!) 140/42   Pulse 73   Temp 98.2 °F (36.8 °C) (Bladder)   Resp 18   Ht 1.727 m (5' 8\")   Wt 80.3 kg (177 lb 0.5 oz)   SpO2 100%   BMI 26.92 kg/m²   I/O last 3 completed shifts:  In: 2063.6 [I.V.:715.6; NG/GT:1348]  Out: 2025 [Urine:2025]      RLE:  Incision from previous surgery noted down lateral aspect proximal thigh.  No erythema, cellulitis, warmth, drainage from the site. Compartments are soft, does not tolerate light touch to either extremity due to agitation.  No significant warmth or signs of infection appreciated.  spontaneously moving RLE without any deficit or what appears to be pain,  Distal pulses palpable.  Calf compartment soft.       Data  CBC:   Lab Results   Component Value Date/Time    WBC 15.1 12/27/2023 04:15 AM    HGB 7.5 12/27/2023 04:15 AM     12/27/2023 04:15 AM     BMP:    Lab Results   Component Value Date/Time     12/27/2023 04:15 AM    K 3.9 12/27/2023 04:15 AM    K 4.8 12/10/2023 03:30 AM     12/27/2023 04:15 AM    CO2 27 12/27/2023 04:15 AM    BUN 80 12/27/2023 04:15 AM    CREATININE 3.3 12/27/2023 04:15 AM    CALCIUM 7.5 12/27/2023 04:15 AM    GLUCOSE 178 12/27/2023 04:15 AM     Uric Acid:  No components found for: \"URIC\"  PT/INR:    Lab Results   Component Value Date/Time    INR 1.31 12/09/2023 12:56 PM     Troponin:  No results found for: \"TROPONINI\"  Urine Culture:  No components found for: \"CURINE\"      Current Inpatient Medications    Current Facility-Administered Medications: 0.9 % sodium chloride infusion, , IntraVENous, PRN  0.9 % sodium chloride infusion, , IntraVENous, PRN  OLANZapine (ZYPREXA) tablet 7.5 mg, 7.5 mg, Oral, Nightly  cloNIDine (CATAPRES) tablet 0.1 mg, 0.1 mg, Oral, q8h  sodium chloride (Inhalant) 3 % nebulizer solution 4 mL, 4 mL, Nebulization, BID  0.9 
Orthopaedic Progress Note      SUBJECTIVE   Mr. Hazel seen resting in bed.    Not agitated, follows direction   no adverse events overnight  Live sitter bedside    OBJECTIVE      Physical    VITALS:  BP (!) 154/55   Pulse (!) 104   Temp 100 °F (37.8 °C) (Bladder)   Resp 22   Ht 1.727 m (5' 8\")   Wt 82 kg (180 lb 12.4 oz)   SpO2 93%   BMI 27.49 kg/m²   I/O last 3 completed shifts:  In: 3744.4 [I.V.:855.9; Blood:432.5; NG/GT:2456]  Out: 1920 [Urine:1920]      RLE:  Incision from previous surgery noted down lateral aspect proximal thigh.  No erythema, cellulitis, warmth, drainage from the site. Compartments are soft, nontender to palpation today.  No significant warmth or signs of infection appreciated.  spontaneously moving RLE without any deficit or what appears to be pain,  Distal pulses palpable.  Calf compartment soft.       Data  CBC:   Lab Results   Component Value Date/Time    WBC 14.7 12/28/2023 06:20 AM    HGB 8.7 12/28/2023 06:20 AM     12/28/2023 06:20 AM     BMP:    Lab Results   Component Value Date/Time     12/28/2023 06:20 AM    K 3.8 12/28/2023 06:20 AM    K 4.8 12/10/2023 03:30 AM     12/28/2023 06:20 AM    CO2 27 12/28/2023 06:20 AM    BUN 84 12/28/2023 06:20 AM    CREATININE 3.2 12/28/2023 06:20 AM    CALCIUM 7.7 12/28/2023 06:20 AM    GLUCOSE 161 12/28/2023 06:20 AM     Uric Acid:  No components found for: \"URIC\"  PT/INR:    Lab Results   Component Value Date/Time    INR 1.31 12/09/2023 12:56 PM     Troponin:  No results found for: \"TROPONINI\"  Urine Culture:  No components found for: \"CURINE\"      Current Inpatient Medications    Current Facility-Administered Medications: sennosides-docusate sodium (SENOKOT-S) 8.6-50 MG tablet 1 tablet, 1 tablet, Per NG tube, BID  polyethylene glycol (GLYCOLAX) packet 17 g, 17 g, Oral, BID  QUEtiapine (SEROQUEL) tablet 25 mg, 25 mg, Oral, Nightly  0.9 % sodium chloride infusion, , IntraVENous, PRN  cloNIDine (CATAPRES) tablet 0.1 mg, 0.1 
Orthopaedic Progress Note      SUBJECTIVE   Mr. Hazel seen resting in bed.  Patient more responsive today.  Heparin was started at approximately 11 AM yesterday.  Hemoglobin has remained stable.  Patient endorses no significant pain within the right hip/groin/thigh.  No new orthopedic concerns at this time.  Patient did discuss that he had what appears to be a stage II revision of the right hip with explantation of the cement spacer and reimplantation of a permanent prosthesis done by another surgeon at [facility].      OBJECTIVE      Physical    VITALS:  BP (!) 163/75   Pulse 91   Temp 97.5 °F (36.4 °C) (Oral)   Resp 20   Ht 1.727 m (5' 8\")   Wt 77.5 kg (170 lb 13.7 oz)   SpO2 97%   BMI 25.98 kg/m²   I/O last 3 completed shifts:  In: 1371 [I.V.:373.1; Blood:680; NG/GT:230; IV Piggyback:87.9]  Out: 2825 [Urine:2825]      RLE:  Incision from previous surgery noted down lateral aspect proximal thigh.  No significant erythema, cellulitis, warmth, drainage from the site.  Mild induration appreciated but no significant change from yesterday's examination.  No significant warmth or signs of infection appreciated.  Able to perform logroll with stiffness noted however no tenderness in the right hip/groin region.  Distal pulses palpable.  Calf compartment soft.  Mild effusion to the right knee present.      Data  CBC:   Lab Results   Component Value Date/Time    WBC 11.1 12/25/2023 04:39 AM    HGB 8.9 12/25/2023 10:44 AM     12/25/2023 04:39 AM     BMP:    Lab Results   Component Value Date/Time     12/25/2023 10:44 AM    K 3.6 12/25/2023 10:44 AM    K 4.8 12/10/2023 03:30 AM     12/25/2023 10:44 AM    CO2 24 12/25/2023 10:44 AM    BUN 80 12/25/2023 10:44 AM    CREATININE 3.1 12/25/2023 10:44 AM    CALCIUM 7.6 12/25/2023 10:44 AM    GLUCOSE 106 12/25/2023 10:44 AM     Uric Acid:  No components found for: \"URIC\"  PT/INR:    Lab Results   Component Value Date/Time    INR 1.31 12/09/2023 
[facility]  SPEECH THERAPY MISSED TREATMENT NOTE  STRZ ICU 4D      Date: 2023  Patient Name: Neftali Hazel        MRN: [id number]    : 1935  (88 y.o.)    REASON FOR MISSED TREATMENT:  Attempted to see patient at 1015 for multi-modal cognitive communication per early mobility. HEATHER John reporting lack of command following with needs to defer session. Will resume POC as medical status permits.       Micheline Ni M.A., CCC-SLP [id number]          
Patient arrived to unit from 4K via cot. Patient transferred to ICU bed 4D and placed on continuous ICU bedside monitor. Patient admitted for respiratory failure. Vitals obtained. See flowsheets. Patient's IV access includes 22g R FA. Current infusions and rates of infusion include heparin at 22units/kg/hr. Assessment completed by Yanira RN. Two nurse skin assessment completed by Yanira ROMERO and Peri RN. See flowsheets for assessment details. Policies and procedures of ICU able to be explained to patient at this time. Family member(s)/representative(s) present at time of admission include none.  
Patient awake and sitter requested RN to check to see if he needed something...sitter couldn't understand patient speech. Patient told RN he wanted water. RN again explained that patient cannot have water due to aspiration issues and oxygenation issues.RN asked if he would like cleaned up and patient agreed. While cleaning up patient, he became angry that he couldn't have water. Would not agree to a swab, only a cup of water. Again explained the reasoning behind this. Patient now pushing against RN and sitter while trying to finish putting his gown on and unrolling the sheet. Patient begins to yell obscenities at RN and hit her. Told patient he needed to stop or he would be restrained to not injure himself or someone else. Patient stops hitting but keeps yelling obscenities at RN. Patient gown back on and patient asked for pen and paper, which this RN provides. When asked if he was having pain and wanted any pain medication he replied no. RN left room with sitter sitting outside of arms length of patient and bed alarm on.   
Patient discharged and readmitted with inpatient hospice. Discussed with patient's son, Neftali.   
Patient educated on how to use incentive spirometer.  Patient verbalized understanding and demonstrates proper usage.  Patient does take deep breaths and does have strong cough when coughing.  Emphasized importance and usage of device, with cough and deep breathing every 4 hours while awake.       
Patient educated on how to use incentive spirometer.  Patient verbalized understanding and demonstrates proper usage.  Patient does take deep breaths and does have strong cough when coughing.  Emphasized importance and usage of device, with cough and deep breathing every 4 hours while awake.       
Patient educated on how to use incentive spirometer.  Patient verbalized understanding.  Patient repeatedly attempted use but does not seal mouth around device.  When attempting to correct patient, patient states, \"I know\" and continues to use device incorrectly.  Patient does take deep breaths and does have strong cough when coughing.  Emphasized importance and usage of device, with cough and deep breathing every 4 hours while awake.    
Patient educated on how to use incentive spirometer. Patient unable to perform at this time. Will continue to educate and attempt to assist with usage. Emphasized importance and usage of device, with coughing and deep breathing every 4 hours while awake.        
Patient educated on how to use incentive spirometer. Patient verbalized understanding and demonstrated proper use. Emphasized importance and usage of device, with coughing and deep breathing every 1 hours while awake. Patient unable to perform at this time.  
Patient educated on how to use incentive spirometer. Patient verbalized understanding and demonstrated proper use. Emphasized importance and usage of device, with coughing and deep breathing every 4 hours while awake.        
Patient is an 88 year old from [facility] inpatient unit with Jeimy Talley NP transferring. Patient was admitted to them 11/27/23 for revision of a septic hip, patient had hypotension post op and went to ICU on Levophed for a short time with respiratory failure, never intubated. Patient oxygen requirements were steadily increasing and then patient seemed to do better the last few days. Patient is now back on HFNC, 40L/60% FiO2. CXR multifocal pneumonia. Patient has been receiving Vancomycin and Zosyn. First sputum culture was inconclusive due to too much saliva, second sputum sent yesterday, preliminary Gram (-) Bacilli. Patient has CKD, creatinine today 2.3 so they are doing a CT chest without contrast. VS: /80, HR 94, RR 24, SpO2 95% HFNC, afebrile. Requesting transfer for pulmonology evaluation. Accepted in transfer under hospitalist to a stepdown bed.   
Patient is not appropriate for IPR admission, recommend SNF based on therapy notes and patient current needs.      1410 Rounded on unit, spoke with primary RN, no family present, explained to RN that patient is not a candidate for IPR admission.  Primary RN states that the son is available by telephone, he works night shift and to leave a voicemail message.    Voicemail message left for patient son.     
Patient is unable to be educated on how to use incentive spirometer.  Patient is now comfort care . Patient is unable to follow instruction and does not verbalize understanding or demonstrated proper use. Oxygen remains in place for comfort measures. Family is at bedside requesting decreased stimulation at this time.       
Patient noted to be hypoxic in the 70's. HHFNC increased to 100%/60L. Patient slow to respond to increased oxygen, supraclavicular retractions, new confusion. MD notified, will call ICU to reevaluate.   
Patient transferred to [facility]. All belongings with patient.   
Patient very anxious. RR 45, unable to calm.  Orders for morphine/ ativan/  Morphine given.  Precedex remains off due to earlier bradycardic episodes.    1615-  RR 55, spo2 down to 62%  morphine, ativan ineffective.  50 mg. Bolus given per Dr. Rees.  Orders to increase propofol up to 60 mcg/kg/min.    1622-  Spo2 down to 82%, 100% fio2.  Propofol bolus complete, infusion set at 60.  Patient's eyes are closed, RR still 50.  Respiratory department called for assessment.  
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  Intubated 12/8/23.  Marginal at PS 12.  Plan to extubate to BiPAP when pneumonia adequately treated.  Anticipate Monday  Pulmonary Fibrosis: Extensive disease on CT with multiple rows upon multiple rows of honeycomb cysts.  Anticipate chronic tachypnea.  A RR in the mid twenties to lower thirties appears to be baseline.  Emphysema: Secondary to prior smoking.  Subsequent emphysematous fibrosis with irreversible fibrosis.  LAMA/LABA.  On Prednisone.  Pneumonia: Aspiration in nature.  Secondary to E coli.  Day 4/8 Rocephin.  Cardiomyopathy: EF 45%. Diurese as required.   Bilateral LE DVT: On heparin drip.  Acute on Chronic Renal Failure: nephrology following.  Paroxysmal Atrial Fibrillation: Chronically on Eliquis.  Currently on heparin drip.  Currently in sinus rhythm.  [facility]  Status post antibiotics for prosthetic infection previously.  Hypertension: On amlodipine.  Given active bronchospasm, stopped betablocker therapy.  CAD: Prior MI in the past.  On aspirin. Given extent of lung disease, medical management only.  DM II:  On Lantus and SSI  Peripheral Neuropathy: Secondary to DM. On gabapentin.   Hyperlipidemia: Continue statin therapy  History of Cancer:  This includes bladder and prostate cancer.     CC:  Acute on chronic respiratory failure  HPI: Patient is an 88 year old white male reformed smoker.  He has a history CAD with prior MI with residual mild cardiomyopathy with baseline EF of 45%.  He has hyperlipidemia, HTN, DM II with peripheral neuropathy, 
Patient:  Neftali Hazel    Unit/Bed:4D-13/013-A  MRN: [id number]   PCP: Brenda Prajapati MD  Date of Admission: 12/8/2023    Assessment and Plan(All pulmonary edema, renal failure, PE, and respiratory failure diagnoses are acute in nature unless otherwise specified):        Acute on chronic respiratory failure with hypoxemia: Likely multifactorial: Imaging consistent with multifocal pneumonia, pulmonary vascular congestion, COPD, interstitial lung disease.  Patient was started on steroids, ceftriaxone, doxycycline.  Patient was intubated on arrival to ICU on 12/12  Continue Stiolto  Continue prednisone 40 mg for 5 days: Current day 5.  Continue propofol gtt  Continue Precedex gtt  Chest x-ray ordered  Acute on chronic HFmrEF, systolic dysfunction: Patient has been receiving intermittent diuretics at outside facility and in [facility].  Echo at OSH shows Monique the EF at 45%.  11/28 BNP 6529 at OSH.  CXR on admission demonstrated cardiomegaly, prominent interstitial and ill-defined airspace disease suspicious for edema versus infection in probable bilateral pleural effusion.  Patient received furosemide 40 mg IV on admission.  Furosemide held secondary to RAMANDEEP on CKD stage III  Nephrology consulted for assistance with diuresis given CKD stage III  Daily weights  Strict I's and O's  Aspiration pneumonia: Respiratory cultures at OSH grew gram-negative bacilli.  Pro-Rommel 0.3 at OSH, 0.25 on admission to [facility].  Patient was started on ceftriaxone and doxycycline on 12/10. Discontinue doxycycline on 12/12 based on culture sensitivity.  Pneumonia panel 12/12 showed E. coli.  Respiratory culture 12/8 grew E. coli.  Continue Rocephin 2 g daily  UA 12/12 showed moderate blood, protein 100, negative nitrates, trace leukocyte esterase, WBC 2-4.  Acute DVT: Venous Doppler positive for thrombus in bilateral peroneal and left anterior tibial veins.  Continue heparin gtt  Plan to transition to Eliquis prior to discharge  RAMANDEEP on CKD stage 
Patient:  Neftali Hazel    Unit/Bed:4D-13/013-A  MRN: [id number]   PCP: Brenda Prajapati MD  Date of Admission: 12/8/2023    Assessment and Plan(All pulmonary edema, renal failure, PE, and respiratory failure diagnoses are acute in nature unless otherwise specified):        Acute on chronic respiratory failure with hypoxemia: Likely multifactorial: Imaging consistent with multifocal pneumonia, pulmonary vascular congestion, COPD, interstitial lung disease.  Patient was started on steroids, ceftriaxone, doxycycline. Patient was intubated on arrival to ICU on 12/12.  Patient completed 5-day dose of prednisone 12/9-12/13.   Continue Stiolto  Continue propofol gtt  Wean ventilator as tolerated  Plan to extubate to BiPAP once pneumonia is treated.  Pulmonary fibrosis: Extensive disease on CT with multiple rows of honeycomb cysts.  Anticipate chronic tachypnea.  CXR 12/18: Showed no significant interval change  Acute on chronic HFmrEF, systolic dysfunction: Patient has been receiving intermittent diuretics at outside facility and in [facility].  Echo at OSH shows Monique the EF at 45%.  11/28 BNP 6529 at OSH.  CXR on admission demonstrated cardiomegaly, prominent interstitial and ill-defined airspace disease suspicious for edema versus infection in probable bilateral pleural effusion.  Patient received furosemide 40 mg IV on admission. Furosemide held secondary to RAMANDEEP on CKD stage III.  Lasix was started on 12/16.  Continue Lasix 20 mg daily  Nephrology following to aid with diuresis  Daily weights  Strict I's and O's  Aspiration pneumonia: Respiratory cultures at OSH grew gram-negative bacilli.  Pro-Rommel 0.3 at OSH, 0.25 on admission to [facility].  Patient was started on ceftriaxone and doxycycline on 12/10. Discontinue doxycycline on 12/12 based on culture sensitivity. Pneumonia panel 12/12 showed E. coli. Respiratory culture 12/8 grew E. coli. Patient received 3 days of Rocephin 1 g daily, which was increased for 5 days to 2 g 
Patient:  Neftali Hazel    Unit/Bed:4D-13/013-A  MRN: [id number]   PCP: Brenda Prajapati MD  Date of Admission: 12/8/2023    Assessment and Plan(All pulmonary edema, renal failure, PE, and respiratory failure diagnoses are acute in nature unless otherwise specified):        Acute on chronic respiratory failure with hypoxemia: Likely multifactorial: Imaging consistent with multifocal pneumonia, pulmonary vascular congestion, COPD, interstitial lung disease.  Patient was started on steroids, ceftriaxone, doxycycline. Patient was intubated on arrival to ICU on 12/12.  Patient completed 5-day dose of prednisone 12/9-12/13.   Continue Stiolto  Continue propofol gtt  Wean ventilator as tolerated  Plan to extubate to BiPAP and pneumonia is treated.  Pulmonary fibrosis: Extensive disease on CT with multiple rows of honeycomb cysts.  Anticipate chronic tachypnea.  CXR 12/17 showed no interval change.  Acute on chronic HFmrEF, systolic dysfunction: Patient has been receiving intermittent diuretics at outside facility and in [facility].  Echo at OSH shows Monique the EF at 45%.  11/28 BNP 6529 at OSH.  CXR on admission demonstrated cardiomegaly, prominent interstitial and ill-defined airspace disease suspicious for edema versus infection in probable bilateral pleural effusion.  Patient received furosemide 40 mg IV on admission. Furosemide held secondary to RAMANDEEP on CKD stage III  Nephrology following to aid with diuresis  Daily weights  Strict I's and O's  Aspiration pneumonia: Respiratory cultures at OSH grew gram-negative bacilli.  Pro-Rommel 0.3 at OSH, 0.25 on admission to [facility].  Patient was started on ceftriaxone and doxycycline on 12/10. Discontinue doxycycline on 12/12 based on culture sensitivity. Pneumonia panel 12/12 showed E. coli. Respiratory culture 12/8 grew E. coli.  Patient received 3 days of Rocephin 1 g daily, which was increased for 5 days to 2 g daily.  This course was finished on 12/16.  Due to persistent 
Patient:  Neftali Hazel    Unit/Bed:4D-13/013-A  MRN: [id number]   PCP: Brenda Prajapati MD  Date of Admission: 12/8/2023    Assessment and Plan(All pulmonary edema, renal failure, PE, and respiratory failure diagnoses are acute in nature unless otherwise specified):        Acute on chronic respiratory failure with hypoxemia: Likely multifactorial: Imaging consistent with multifocal pneumonia, pulmonary vascular congestion, COPD, interstitial lung disease.  Patient was started on steroids, ceftriaxone, doxycycline. Patient was intubated on arrival to ICU on 12/12.  Patient completed 5-day dose of prednisone 12/9-12/13.   Continue Stiolto  Continue propofol gtt  Wean ventilator as tolerated  Plan to extubate to BiPAP once pneumonia is treated.  Pulmonary fibrosis: Extensive disease on CT with multiple rows of honeycomb cysts.  Anticipate chronic tachypnea.  CXR 12/19 showed no significant changes.  Acute on chronic HFmrEF, systolic dysfunction: Patient has been receiving intermittent diuretics at outside facility and in [facility].  Echo at OSH shows Monique the EF at 45%.  11/28 BNP 6529 at OSH.  CXR on admission demonstrated cardiomegaly, prominent interstitial and ill-defined airspace disease suspicious for edema versus infection in probable bilateral pleural effusion.  Patient received furosemide 40 mg IV on admission. Furosemide held secondary to RAMANDEEP on CKD stage III.  Lasix was started on 12/16.  Continue Lasix 20 mg daily  Nephrology following to aid with diuresis  Daily weights  Strict I's and O's  Aspiration pneumonia: Respiratory cultures at OSH grew gram-negative bacilli.  Pro-Rommel 0.3 at OSH, 0.25 on admission to [facility].  Patient was started on ceftriaxone and doxycycline on 12/10. Discontinue doxycycline on 12/12 based on culture sensitivity. Pneumonia panel 12/12 showed E. coli. Respiratory culture 12/8 grew E. coli. Patient received 3 days of Rocephin 1 g daily, which was increased for 5 days to 2 g daily.  
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 Intubated 12/8/23.  Extubated 12/21/23.  On supplemental oxygen.  Will wean FiO2 keep saturation more than 90%  Delirium:  Manage with Precedex and Zyprexa.  Avoid benzodiazepines if possible.  Benzodiazepines should be third line utilization. Mentation slightly improved.  Pulmonary fibrosis: Extensive disease on CT with multiple rows upon multiple rows of honeycomb cysts.  Anticipate chronic tachypnea.  A RR in the mid twenties appears to be baseline per Dr. Miguel Rees MD.  Emphysema: Secondary to prior smoking.  Subsequent emphysematous fibrosis with irreversible fibrosis.  S/p steroids. Continue LAMA/LABA  Pneumonia: Aspiration in nature.  Secondary to E coli.  Patient completed coarse of Rocephin.  Repeat culture 12.17/13 grew Pseudomonas.  Zosyn started 12/19/23 and then transitioned to cefepime based on culture results 12/21/23.  Cefepime completed.  Cardiomyopathy: EF 45%. Diurese as required.   BLE DVT: On heparin drip-on hold due to anemia of uncertain etiology with a drop in H&Hb.  Patient noted to have multiple loculated mixture attenuated areas involving the vastus lateralis and there is a collection of heterogenous material in the posterior compartment measuring 12 x 10 x 8 cm in size.  Patient was evaluated by Dr. Irvin Aguirre MD service from orthopedics-patient was cleared to start back on heparin drip from orthopedic service.  If patient right hip hematoma get worse, orthopedic service to be notified.  No plans for any intervention at this time. Monitor  Acute on chronic renal failure: Nephrology 
Patient:  Neftali Hazel    Unit/Bed:4D-13/013-A  MRN: [id number]   PCP: Brenda Prajapati MD  Date of Admission: 12/8/2023    Assessment and Plan(All pulmonary edema, renal failure, PE, and respiratory failure diagnoses are acute in nature unless otherwise specified):        Acute on chronic hypoxic respiratory failure: Likely multifactorial: Imaging consistent with multifocal pneumonia, pulmonary vascular congestion, COPD, interstitial lung disease.  Patient was started on steroids, ceftriaxone, doxycycline. Patient was intubated on arrival to ICU on 12/12.  Patient completed 5-day dose of prednisone 12/9-12/13.   Continue Stiolto  Continue propofol gtt  Wean ventilator as tolerated  Thick secretions precludes extubation at this time - added NaCl Nebulizers BID.  Pulmonary fibrosis: Extensive disease on CT with multiple rows of honeycomb cysts.  Anticipate chronic tachypnea.  CXR 12/19 showed no significant changes.  Acute on chronic HFmrEF, Systolic dysfunction: Patient has been receiving intermittent diuretics at outside facility and in [facility].  Echo at OSH shows Monique the EF at 45%.  11/28 BNP 6529 at OSH.  CXR on admission demonstrated cardiomegaly, prominent interstitial and ill-defined airspace disease suspicious for edema versus infection in probable bilateral pleural effusion.  Patient received furosemide 40 mg IV on admission. Furosemide held secondary to RAMANDEEP on CKD stage III.  Lasix was started on 12/16.  Hold lasix 12/20 due to increasing Creatinine  Strict I&O's, Daily Wts  Aspiration Pneumonia: Respiratory cultures at OSH grew gram-negative bacilli.  Pro-Rommel 0.3 at OSH, 0.25 on admission to [facility].  Patient was started on ceftriaxone and doxycycline on 12/10. Discontinue doxycycline on 12/12 based on culture sensitivity. Pneumonia panel 12/12 showed E. coli. Respiratory culture 12/8 grew E. coli. Patient received 3 days of Rocephin 1 g daily, which was increased for 5 days to 2 g daily.  This course was 
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 Intubated 12/8/23.  Extubated 12/21/23.  On supplemental oxygen.  Will wean FiO2 keep saturation more than 90%  Delirium, Intermittent:  Manage with Zyprexa. Off Precedex. Avoid benzodiazapine. 10 mg Geodon given today. Continue to monitor  Pulmonary fibrosis: Extensive disease on CT with multiple rows upon multiple rows of honeycomb cysts.  Anticipate chronic tachypnea.  A RR in the mid twenties appears to be baseline per Dr. Miguel Rees MD.  Emphysema: Secondary to prior smoking.  Subsequent emphysematous fibrosis with irreversible fibrosis.  S/p steroids. Continue LAMA/LABA  Pneumonia: Aspiration in nature.  Secondary to E coli.  Patient completed coarse of Rocephin.  Repeat culture 12.17/13 grew Pseudomonas.  Zosyn started 12/19/23 and then transitioned to cefepime based on culture results 12/21/23.  Cefepime completed.  Cardiomyopathy: EF 45%. Diurese as required.   BLE DVT: On heparin drip-on hold due to anemia of uncertain etiology with a drop in H&Hb.  Patient noted to have multiple loculated mixture attenuated areas involving the vastus lateralis and there is a collection of heterogenous material in the posterior compartment measuring 12 x 10 x 8 cm in size. Evaluated by Ortho and they are following. No plans for any intervention at this time.   Acute on chronic renal failure: Nephrology following, appreciate recommendations  Paroxysmal atrial fibrillation: Chronically on Eliquis.  Currently on heparin drip.  Currently in sinus rhythm.    Right Hip Revision: Occurred at outlying facility on 11/27/23.  Status 
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  Intubated 12/8/23.  Marginal at PS 12.  Plan to extubate to BiPAP when pneumonia adequately treated.  Pulmonary Fibrosis: Extensive disease on CT with multiple rows upon multiple rows of honeycomb cysts.  Anticipate chronic tachypnea.  A RR in the mid twenties to lower thirties appears to be baseline.  Emphysema: Secondary to prior smoking.  Subsequent emphysematous fibrosis with irreversible fibrosis.  LAMA/LABA.  On Prednisone.  Pneumonia: Aspiration in nature.  Secondary to E coli.  Day 3/8 Rocephin.  Cardiomyopathy: EF 45%. Diurese as required.   Bilateral LE DVT: On heparin drip.  Acute on Chronic Renal Failure: nephrology following.  Paroxysmal Atrial Fibrillation: Chronically on Eliquis.  Currently on heparin drip.  Currently in sinus rhythm.  Right hip revision: Occurred at outlying facility on 11/27/23.  Status post antibiotics for prosthetic infection previously.  Hypertension: On amlodipine.  Given active bronchospasm, will stop betablocker therapy.  CAD: Prior MI in the past.  On aspirin. Given extent of lung disease, medical management only.  DM II:  On Lantus and SSI  Peripheral Neuropathy: Secondary to DM. On gabapentin.   Hyperlipidemia: Continue statin therapy  History of Cancer:  This includes bladder and prostate cancer.    CC:  Acute on chronic respiratory failure  HPI: Patient is an 88 year old white male reformed smoker.  He has a history CAD with prior MI with residual mild cardiomyopathy with baseline EF of 45%.  He has hyperlipidemia, HTN, DM II with peripheral neuropathy, COPD, paroxysmal 
Placed call to patients room, role and self identified, services explained and accepted. Virtual Nurse completed admission required documents, except for med list that son will bring in the morning. Reviewed fall packet and use of call light.  
Placed pt on heated HFNC at this time,  30 lpm at 50% FiO2  
[facility]  OCCUPATIONAL THERAPY MISSED TREATMENT NOTE  STRZ ICU 4D  4D-13/013-A      Date: 2023  Patient Name: Neftali Hazel        CSN: [id number]   : 1935  (88 y.o.)  Gender: male   Referring Practitioner: Araceli Ghotra MD  Diagnosis: Acute hypoxic respiratory failure         REASON FOR MISSED TREATMENT:  Per nursing, pt not alert to participate in early mobility and sedation is needed due to elevated RR and WOB.  Will check back next available date .                  
[facility]  STR CCU 3A  Occupational Therapy  Daily Note  Time:   Time In: 852  Time Out: 930  Timed Code Treatment Minutes: 38 Minutes  Minutes: 38   Co-Tx with PT        Date: 2023  Patient Name: Neftali Hazel,   Gender: male      Room: [facility]011-A  MRN: [id number]  : 1935  (88 y.o.)  Referring Practitioner: Araceli Ghotra MD  Diagnosis: Acute hypoxic respiratory failure  Additional Pertinent Hx: Patient is an 88-year-old male with past medical history significant for COPD with emphysema, CAD s/p stenting, HFmrEF (EF 45%), atrial fibrillation, 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  Patient had antibiotic spacer placed after prosthetic joint infection presented to [facility] for revision of the arthroplasty on 2023.  Patient experienced postoperative hypoxia necessitating high flow nasal cannula and was unable to wean appropriately.  Based on this, patient was transferred to [facility].  Patient has noticed on his proxy decision-maker should he be unable to make decisions himself.  Rapid response was called on  at 0006 a.m.  Patient desaturated despite being on nonrebreather mask and was severely agitated.  Patient was placed on 100% FiO2 60 L high flow nasal cannula with improvement in oxygenation status.  Discussed with patient's the risks and benefits of intubation.  Patient was alert and oriented x 3, voiced understanding, and stated consent for this procedure.  Patient was transferred to ICU for intubation and further management and monitoring. intubated     Restrictions/Precautions:  Restrictions/Precautions: Fall Risk, Weight Bearing  Right Lower Extremity Weight Bearing: Weight Bearing As Tolerated  Position Activity Restriction  Hip Precautions: No hip flexion > 90 degrees, No ADduction, No hip internal 
Pt family stated he was eating and \"is good right now.\" camera access not granted.  
Pt placed back to AC/PC during early mobility due to inc RR and resp distress. Spo2 dec to 87-89% during mobility. Fio2 inc shortly til so2 improved than weaned back down. Once pt resting back in bed RR dec back to mid 20's as previously was.   
Pt was in bed and could not utter a word even his eyes were open. He was encouraged and blessed.    12/19/23 1405   Encounter Summary   Service Provided For: Patient   Referral/Consult From: [facility]   Support System Family members   Last Encounter  12/19/23   Complexity of Encounter Low   Begin Time 1010   End Time  1015   Total Time Calculated 5 min   Spiritual/Emotional needs   Type Spiritual Support   Assessment/Intervention/Outcome   Assessment Unable to assess        
Pt was unresponsive but was blessed.    12/21/23 1450   Encounter Summary   Service Provided For: Patient   Referral/Consult From: [facility]   Support System Family members   Last Encounter  12/21/23  (NR)   Complexity of Encounter Low   Begin Time 0915   End Time  0920   Total Time Calculated 5 min   Spiritual/Emotional needs   Type Spiritual Support   Assessment/Intervention/Outcome   Assessment Unable to assess       
RN received verbal orders from  in regards to restart heparin gtt at previous rate with no bolus if CT head results are negative.   
[facility]  PHYSICAL THERAPY MISSED TREATMENT NOTE  STRZ ICU 4D    Date: 2023  Patient Name: Neftali Hazel        MRN: [id number]   : 1935  (88 y.o.)  Gender: male   Referring Practitioner: Araceli Ghotra MD  Diagnosis: Acute Hypoxic respiratory failure         REASON FOR MISSED TREATMENT:  Hold treatment per nursing request.  Pt transferred to ICU from stepdown and intubated.  Will hold today.  
Report called to ICU RN. RN verbalized understanding w/ no questions at this time. Awaiting transport arrival.   
[facility]  SPEECH THERAPY MISSED TREATMENT NOTE  STRZ ICU 4D      Date: 12/15/2023  Patient Name: Neftali Hazel        MRN: [id number]    : 1935  (88 y.o.)    REASON FOR MISSED TREATMENT:  Attempted to see patient for development of multimodal communication within critical care setting with RN Keny's approval. Patient off all sedation at time of arrival. Eyes opened to call of name; however, absence for direction following. Not appropriate for EM evaluation at this time. ST to f/u on subsequent date as patient medically appropriate.     Perlita Garcia MA, CCC-SLP [id number]            
[facility]  OCCUPATIONAL THERAPY MISSED TREATMENT NOTE  STRZ ICU 4D  4D-13/013-A      Date: 2023  Patient Name: Neftali Hazel        CSN: [id number]   : 1935  (88 y.o.)  Gender: male   Referring Practitioner: Araceli Ghotra MD  Diagnosis: Acute hypoxic respiratory failure         REASON FOR MISSED TREATMENT:  Pt highly agitated in am, RN recommends hold. Attempt x pm, Pt too drowsy to participate. Will check back .                
Son, Neftali called at home with update on patient condition and obtain consent for blood. Consent given over the phone with 2 nurse verification, HEATHER Herron. Dr. Olvera spoke to son on plan of care for the day. All questions and concerns addressed at this time.   
[facility]  INPATIENT REHABILITATION  ADMISSIONS COORDINATOR CONSULT    Referral Type: internal    Patient Name: Neftali Hazel      MRN: [id number]    : 1935  (88 y.o.)  Gender: male   Race:White (non-)     Payor Source: Payor: [employer] MEDICARE / Plan: [employer] GOLD PLUS HMO / Product Type: *No Product type* /   Secondary Payor Source:      Isolation Status: No active isolations    Lives With: Family, Son  Type of Home: House  Home Layout: Two level, Able to Live on Main level with bedroom/bathroom        Additional Comments: Unclear of patient previous level of function at this time.    Disciplines Required upon Admission to Inpatient Rehabilitation: Physical Therapy, Occupational Therapy, and Speech Therapy  Post operative: Yes  Fall: No  Dialysis: No  Diet: Diet NPO  Discussed patient with  and PM&R provider:  Patient is not appropriate for IPR.   Await updated PT/OT notes in order to have information for discussion with patient family regarding recommendation for SNF.    Needs will be better met at a lower level of care such as SNF.    Do recommend contacting palliative care to come back and discuss goals of care with patient and family.      
[facility]  SPEECH THERAPY  STRZ CCU 3A  Speech - Language - Cognitive Evaluation + Dysphagia tx    SLP Individual Minutes  Time In: 1258  Time Out: 1314  Minutes: 16  Timed Code Treatment Minutes: 0 Minutes    Dysphagia tx- 8 minutes  Cognitive-linguistic eval- 8 minutes     Date: 2023  Patient Name: Neftali Hazel      CSN: [id number]   : 1935  (88 y.o.)  Gender: male   Referring Physician:  Efren Carrera DO  Diagnosis: Acute hypoxic respiratory failure (HCC)  Precautions: Aspiration risk  History of Present Illness/Injury: Patient admitted with above diagnosis. Per chart review, \"Patient admitted to [facility] with above med dx; please refer to physician H&P for full details. 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  Patient had antibiotic spacer placed after prosthetic joint infection presented to [facility] for revision of her arthroplasty on 2023.  Patient experienced postoperative hypoxia necessitating high flow nasal cannula and was unable to wean appropriately.  Based on this, patient was transferred to [facility].  Patient has noticed on his proxy decision-maker should he be unable to make decisions himself.  Rapid response was called on  at 0006 a.m.  Patient desaturated despite being on nonrebreather mask and was severely agitated.  Patient was placed on 100% FiO2 60 L high flow nasal cannula with improvement in oxygenation status.  Discussed with patient's the risks and benefits of intubation.  Patient was alert and oriented x 3, voiced understanding, and stated consent for this procedure  Patient was transferred to ICU for 
Staples removed from right hip incision, edges well-approximated.  
This RT attended for early mobility for Neftali Hazel with no complications noted.  
This RT was in attendance for early mobility no complications noted.  
Tube feed turned off by HEATHER Rock per verbal orders from Barbie Brennan CNP.  
Turned Heated hfnc flow down to 20 for pt comfort.    20 lpm at 60%  fiO2  
[facility]  PHYSICAL THERAPY MISSED TREATMENT NOTE  STRZ ICU 4D    Date: 2023  Patient Name: Neftali Hazel        MRN: [id number]   : 1935  (88 y.o.)  Gender: male   Referring Practitioner: Araceil Ghotra MD  Diagnosis: Acute Hypoxic respiratory failure         REASON FOR MISSED TREATMENT:  Per nursing, pt not alert to participate in early mobility and sedation is needed due to elevated RR and WOB.  Will check back next available date .            
VN placed call to son Neftali at 7:45 am this morning who answered and stated he does not know his father's med list and that he just got home from work and has to sleep but will be at the hospital later today. VN messaged  beside nurse Stanley via perfect serve to make aware of above information.  
[facility]  SPEECH THERAPY MISSED TREATMENT NOTE  STRZ CCU 3A      Date: 2023  Patient Name: Neftali Hazel        MRN: [id number]    : 1935  (88 y.o.)    REASON FOR MISSED TREATMENT:      Patient unable to complete scheduled modified barium swallow study this morning due to requiring heated HFNC.  Patient has NG for nutrition source.  ST to continue to follow and complete instrumental assessment when clinically appropriate.    Amanda Walls M.A. CCC-SLP         
[facility]  OCCUPATIONAL THERAPY MISSED TREATMENT NOTE  STRZ ICU 4D  4D-13/013-A      Date: 2023  Patient Name: Neftali Hazel        CSN: [id number]   : 1935  (88 y.o.)  Gender: male   Referring Practitioner: Araceli Ghotra MD  Diagnosis: Acute hypoxic respiratory failure         REASON FOR MISSED TREATMENT:  Per RN patient was following commands around 0800, extubated yesterday, however patient is no longer following commands. Patient in bed appears, agitated but unable to follow simple commands. Per RN hold OT visit, OT will attempt next available opening.                 
BID  diclofenac sodium (VOLTAREN) 1 % gel 2 g, 2 g, Topical, BID  QUEtiapine (SEROQUEL) tablet 25 mg, 25 mg, Oral, Nightly  0.9 % sodium chloride infusion, , IntraVENous, PRN  cloNIDine (CATAPRES) tablet 0.1 mg, 0.1 mg, Oral, q8h  sodium chloride (Inhalant) 3 % nebulizer solution 4 mL, 4 mL, Nebulization, BID  hydrALAZINE (APRESOLINE) injection 10 mg, 10 mg, IntraVENous, Q6H PRN  potassium chloride (KLOR-CON M) extended release tablet 40 mEq, 40 mEq, Oral, PRN **OR** potassium bicarb-citric acid (EFFER-K) effervescent tablet 40 mEq, 40 mEq, Oral, PRN **OR** potassium chloride 10 mEq/100 mL IVPB (Peripheral Line), 10 mEq, IntraVENous, PRN  potassium bicarb-citric acid (EFFER-K) effervescent tablet 20 mEq, 20 mEq, Oral, Daily  lansoprazole (PREVACID SOLUTAB) disintegrating tablet 30 mg, 30 mg, Orogastric, QAM AC  tiotropium-olodaterol (STIOLTO) 2.5-2.5 MCG/ACT inhaler 4 puff, 4 puff, Inhalation, Daily  albuterol (PROVENTIL) (2.5 MG/3ML) 0.083% nebulizer solution 10 mg, 10 mg, Nebulization, Q4H PRN  gabapentin (NEURONTIN) capsule 300 mg, 300 mg, Oral, BID  heparin 25,000 units in dextrose 5% 250 mL (premix) infusion, 5-30 Units/kg/hr (Order-Specific), IntraVENous, Continuous  albuterol (PROVENTIL) (2.5 MG/3ML) 0.083% nebulizer solution 2.5 mg, 2.5 mg, Nebulization, Q6H PRN  insulin glargine (LANTUS) injection vial 4 Units, 4 Units, SubCUTAneous, QAM  pravastatin (PRAVACHOL) tablet 10 mg, 10 mg, Oral, Nightly  heparin (porcine) injection 4,000 Units, 4,000 Units, IntraVENous, PRN  heparin (porcine) injection 2,000 Units, 2,000 Units, IntraVENous, PRN  insulin lispro (HUMALOG) injection vial 0-4 Units, 0-4 Units, SubCUTAneous, TID WC  insulin lispro (HUMALOG) injection vial 0-4 Units, 0-4 Units, SubCUTAneous, Nightly  glucose chewable tablet 16 g, 4 tablet, Oral, PRN  dextrose bolus 10% 125 mL, 125 mL, IntraVENous, PRN **OR** dextrose bolus 10% 250 mL, 250 mL, IntraVENous, PRN  Glucagon Emergency KIT 1 mg, 1 mg, 
Tolerated  Position Activity Restriction  Hip Precautions: No hip flexion > 90 degrees, No ADduction, No hip internal rotation      SUBJECTIVE: Pt eyes open throughout session, however at times seeming not to be focusing on anything    RT present to assist with ET management and oxygenation  and RN present to assist with line management     PAIN:  /10: Pt unable to give #, Pt nodding head no when asked if having pain    Lines/Tubes:  ETT    Vitals: Blood Pressure: 196/48 in supine; 186/53  Oxygen: 100%  Heart Rate: 97  Respiratory Rate: 23-32 (elevated toward end of session & with coughing)  Vent Settings on   with PEEP of 6 and FiO2 of 30%.   RT increased vent settings    COGNITION: Slow Processing and Difficulty Following Commands  Follows less than 10% of commands and Eyes open 100% of session    ADL:   Grooming: Dependent.  For washing face  Footwear Management: Dependent.  For adjusting slipper socks .    BALANCE:  Sitting Balance:  Maximum Assistance. To dependent with fatigue; Pt not righting balance  Pt tolerates sitting EOB 18 minutes.  EOB completed for increase balance/tolerance needed for eventual ADL & transfer   Pt demonstrates/requires Left Lean and forward flexed posture (with head down) vs & tactile cues for posture    BED MOBILITY:  Rolling to Left: Dependent, X 2    Rolling to Right: Dependent, X 2    Supine to Sit: Dependent, X 2    Sit to Supine: Dependent, X 2      ADDITIONAL ACTIVITIES:  Unable to get Pt to participate in scap AROM, did squeeze therapist's UE with RUE during session      AM-PAC Inpatient Daily Activity Raw Score: 6  AM-PAC Inpatient ADL T-Scale Score : 17.07  ADL Inpatient CMS 0-100% Score: 100    Modified [place] Scale:  Not Applicable    ASSESSMENT:     Activity Tolerance:  Patient tolerance of  treatment: Fair treatment tolerance       Discharge Recommendations: Continue to assess pending progress  Equipment Recommendations: Equipment Needed:  (CONTINUE TO ASSESS)  Plan: 
and needed cues for improved breathing technique as he is a mouth breather and did seem to have some labored breathing     PAIN: pt c/o of pain at right lateral thigh he was tender to touch and c/o of pain w/ movement- pt also had pain w/ heelcord stretch at shabbir LES     Vitals: Oxygen: pt on 2L o2 and sats > 90% however noted elevated RR at times -   Heart rate 101-107   BP- 139/53      OBJECTIVE:  Bed Mobility:  Rolling to Left: Maximum Assistance, X 2, then needed mod to max assist at trunk to help reposition in bed - in which he did attempt to help - needed assist to reposition 2x      Exercise:  Patient was guided in 2 set(s) 5 reps of exercise to both lower extremities.  Ankle pumps, Quad sets, Heelslides with mod to max assist at right LE w/ limited range tolerated, Short arc quads with CGA to min assist at right LE, Hip abduction/adduction with min assist at left and max at right w/ very limited range noted improved alignment this date w/ less IR- pt completed UE hand squeeze, shoulder punch, elbow flex/ext- stretch shabbir heelcords 3x30 sec able to stretch just to neutral w/ c/o of pain w/ stretch    Exercises were completed for increased independence with functional mobility.    Functional Outcome Measures: Completed  AM-PAC Inpatient Mobility without Stair Climbing Raw Score : 5  AM-PAC Inpatient without Stair Climbing T-Scale Score : 23.59  Modified [place] Scale:  Not Applicable    ASSESSMENT:  Assessment:  Pt had been given meds earlier due to agitation so he was a little groggy however pt nodded that he did feel better after doing therapy and was agreeable to trial sitting up again   Activity Tolerance:  Patient tolerance of  treatment: fair.        Equipment Recommendations:Other: will follow for needs  Discharge Recommendations: Subacte/Skilled Nursing Facility  Plan: Current Treatment Recommendations: Strengthening, Balance training, Functional mobility training, Endurance training, Patient/Caregiver 
insulin lispro  0-4 Units SubCUTAneous Nightly    amLODIPine  10 mg Oral Daily    aspirin  81 mg Oral Daily    tamsulosin  0.4 mg Oral Daily       Lab Data :  CBC:   Recent Labs     12/29/23  0632 12/29/23  1339 12/30/23  0629 12/30/23  1407 12/31/23  0357   WBC 13.5*  --  12.9*  --  11.0*   HGB 8.2*   < > 8.3* 7.5* 7.4*   HCT 25.9*   < > 26.5* 23.9* 23.6*     --  321  --  356    < > = values in this interval not displayed.     CMP:  Recent Labs     12/29/23  0632 12/29/23  1826 12/30/23  0628 12/31/23  0357    139 142 140   K 3.7 3.8 4.1 3.8    101 104 102   CO2 26 31 29 31   BUN 85* 85* 86* 90*   CREATININE 3.4* 3.3* 3.3* 3.6*   GLUCOSE 171* 169* 173* 144*   CALCIUM 7.7* 7.7* 7.9* 7.8*   MG 2.2  --  2.2 2.2   PHOS 2.4  --  2.9 3.2     Hepatic:   No results for input(s): \"LABALBU\", \"AST\", \"ALT\", \"ALB\", \"BILITOT\", \"ALKPHOS\" in the last 72 hours.      Assessment and Plan:  Renal -acute kidney injury most likely multifactorial etiology probably from sepsis he was getting some vancomycin as well not sure what his home medications were  Urine studies show significant proteinuria also several RBCs -serologies negative  Worsening  Will hold off fluids as he does have some crackles  Discussed with pt if continues to worsen may need dialysis, he stated he would rather die than have dialysis    Electrolytes -stable  Acid-base status appears to be stable  Acute hypoxic respiratory failure . Persistent.   ? 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  Discussed with nursing staff    Antonieta Gupta DO  [facility]    This report has been created using voice recognition software. It may contain minor errors which are inherent in voice recognition technology  
intubation.  INTERVENTIONS:ST attempted to complete oral care prior to PO trials; however, patient refusing stating, \"hell no\". Education provided re: rationale for oral care; however, continued refusal.     Conservative PO trials completed of ice chips + thin liquids. Functional oral acceptance with decreased bolus containment s/t heightened impulsivity and inability to maintain labial seal. Mild-moderate anterior spillage of thin liquid viscosities. Consistent pharyngeal trigger achieved; however, limited intake s/t anterior spillage with throat clear + immediate cough present following trials. Hoarse, strained vocal quality noted following thin liquids. Certainly cannot determine pharyngeal deficits at bedside; however, highly suspect presence of airway invasion events.    At this time, recommend continuation of NPO recommendation + NGT.   Recommend completion of MBS as impulsivity declines + alertness improves to ensure adequate texture trial analysis.   ST to f/u on subsequent date to determine potential readiness.     SHORT TERM GOAL #5:  Goal 5: Patient will complete instrumental evaluation to further assess pharyngeal swallow function and update POC as clinically indicated.  INTERVENTIONS: Not appropriate given heightened levels of impulsivity.    Long-Term Goals:  No LTGs established d/t short ELOS         Functional Oral Intake Scale: Tube Dependent: 2.  Tube dependent with minimal/inconsistent oral intake    EDUCATION:  Learner: Patient  Education:  Reviewed ST goals and Plan of Care and Reviewed recommendations for follow-up  Evaluation of Education: Needs further instruction, No evidence of learning, and Family not present    ASSESSMENT/PLAN:  Activity Tolerance:  Patient tolerance of  treatment: poor.      Assessment/Plan: Patient progressing toward established goals.  Continues to require skilled care of licensed speech pathologist to progress toward achievement of established goals and plan of care.. 
mobility however then had elevated BP nursing had given IV meds for BP and ok'd therapy  - pt followed < 10% of commands his eyes were open however pt not tracking and he would squint his eyes frequently- pt seemed to nod his head appropriately   RT present to assist with ET management and oxygenation  and RN present to assist with line management     Pain: pt nodded no however he did grimace when I stretched his heelcords     Vitals: Blood Pressure: 186/53  Oxygen: O2 sats WNL- peep of 6 Fio2 of 30%- RT adjusted pressure support and monitored RR he did elevate into the 30's  he was on SBT throughout session w/ a lot of secretions         OBJECTIVE:  Bed Mobility:  Rolling to Left: Dependent, X 2   Rolling to Right: Dependent, X 2   Supine to Sit: Dependent, X 2  Sit to Supine: Dependent, X 2   Scooting: Dependent, X 2    Balance:  Static Sitting Balance:  Maximum Assistance, to dependent of one and assist of another for balance-   Pt tolerates sitting EOB for 18  minutes.  EOB completed for increased core strength/endurance,balance and prep for OOB activity   Pt demonstrates/requires  pt leaning post and to the left w/ kyphotic posture - pt making no righting reaction and no use of UEs  to help     Exercise:  Pt not following directions for LE ex while in supine he does tend to hold his right LE in IR, did stretch shabbir heelcords 3x30 sec each, and was dependent to place LEs up into hooklying- once sitting on 3 attempts he did attempt to complete long arc qauds but < 1/4 range and unable to get him to engage in his left LE- pt would squeeze my hand consistantly w/ right but not w/ left      Functional Outcome Measures:   Completed  AM-PAC Inpatient Mobility without Stair Climbing Raw Score : 5  AM-PAC Inpatient without Stair Climbing T-Scale Score : 23.59    Modified [place] Scale:  Not Applicable    ASSESSMENT:  Assessment:  pt cont to be limited w/ following directions, he required much assist of 2 persons for bed 
place  Paroxysmal Atrial Fibrillation: Chronically on Eliquis.  Currently on heparin drip.  Currently in sinus rhythm.  Continue Heparin drip (started 12/12) - treatment for DVT mentioned above  Right hip revision: Occurred at [facility] facility on 11/27/23.  Patient gradually become more hypoxic following treatment for infected prosthetic and was brought to this facility. CXR with infiltrates, so started on Vancomycin + Zosyn and diuresed. Brought to ICU 12/12 and intubated.  S/p treatment for infected prosthetic  Hypertension: Home medication includes amlodipine.  Holding BB therapy due to active bronchospasms   Continue Amlodipine 10mg   CAD: Prior MI in the past.  Given extent of lung disease, medical management only.  Continue Aspirin  DM II:  No HgbA1C in chart. Noted in history with peripheral neuropathy.   Ordering HgbA1C  Continue Lantus 4U  Continue low-dose SSI  Peripheral Neuropathy: Secondary to DM.  Continue Gabapentin 300mg BID  Hyperlipidemia: Continue statin therapy  Continue Pravastatin 10mg  History of Cancer:  This includes bladder and prostate cancer.    CC:  Acute on chronic respiratory failure  HPI: Patient is an 88 year old white male reformed smoker.  He has a history CAD with prior MI with residual mild cardiomyopathy with baseline EF of 45%.  He has hyperlipidemia, HTN, DM II with peripheral neuropathy, COPD, paroxysmal atrial fibrillation, prostate cancer, and history of bladder cancer.  [facility].  Reportedly he had an infected joint with prior antibiotic spacer. He developed progressive hypoxemia requiring high flow oxygen.  He was received in transfer to [facility] on 12/8/23 where CXR showed diffuse bilateral infiltrates.  Patient was started on vancomycin and Zosyn for suspected pneumonia.  Given history of cardiomyopathy he was also diuresed.  CT chest showed emphysematous pulmonary fibrosis 
position/activity restrictions: pt [place]     SUBJECTIVE: Nurse Elizabeth meier session, live sitter, in bed upon arrival, agreeable to OT session, motivated    PAIN: 3/10: R LE    Vitals: Patient on HFNC 25lpm and 45% pulse ox 80-91%    COGNITION: Inattention, Decreased Problem Solving, and Decreased Safety Awareness [place]    ADL:   No ADL's completed this session..    BALANCE:  Sitting Balance:  Maximum Assistance. To CGA, severe R lean, Able to decrease assistance when holding grab bar on L   sat at EOB greater than 15 minutes    BED MOBILITY:  Supine to Sit: Maximum Assistance, X 2 HOB elevated, used bedrail  Sit to Supine: Dependent, X 2      TRANSFERS:  Sit to Stand:  Maximum Assistance, X 2. With RW, high bed  Stand to Sit: Maximum Assistance, X 2.    Completed with OTR  ADDITIONAL ACTIVITIES:  Guided patient through BUE AROM this date x10 reps x1 set in supine in order to increase BUE strength and improve activity tolerance for ADLs and homemaking tasks.           Modified [place] Scale:  Not Applicable    ASSESSMENT:     Activity Tolerance:  Patient tolerance of  treatment: Fair treatment tolerancelow endurance      Discharge Recommendations: Inpatient Therapy Stay  Equipment Recommendations: Equipment Needed:  (CONTINUE TO ASSESS)  Plan: Times Per Week: 5x  Current Treatment Recommendations: Strengthening, ROM, Balance training, Functional mobility training, Endurance training, Cognitive reorientation, Patient/Caregiver education & training, Safety education & training, Self-Care / ADL    Education:  Learners: Patient  safety    Goals  Short Term Goals  Time Frame for Short Term Goals: until discharge  Short Term Goal 1: Patient will sequence simple BADL with no more than 2 cues with CGA to improve engagement in daily routine.  Short Term Goal 2: Patient will tolerance 15-20 minutes EOB with SBA and unilateral hand releases to complete grooming/hygiene tasks.  Short Term Goal 3: Patient will follow 1-2 step commands 
provider] aspirin  81 mg Oral Daily    tamsulosin  0.4 mg Oral Daily       Lab Data :  CBC:   Recent Labs     12/25/23  0439 12/25/23  1044 12/25/23  1431 12/25/23  2110 12/26/23  0443   WBC 11.1*  --   --   --  13.2*   HGB 8.6*   < > 8.5* 7.9* 7.6*  7.5*   HCT 26.5*   < > 26.0* 24.5* 23.7*  23.5*     --   --   --  207    < > = values in this interval not displayed.       CMP:  Recent Labs     12/24/23  0705 12/25/23  1044 12/26/23  0443    148* 145   K 4.0 3.6 3.4*    109 108   CO2 24 24 26   BUN 88* 80* 78*   CREATININE 3.2* 3.1* 3.0*   GLUCOSE 135* 106 167*   CALCIUM 7.4* 7.6* 7.3*       Hepatic:   No results for input(s): \"LABALBU\", \"AST\", \"ALT\", \"ALB\", \"BILITOT\", \"ALKPHOS\" in the last 72 hours.      Assessment and Plan:  Renal -acute kidney injury most likely multifactorial etiology probably ATN.  Patient was also hypotensive.  Also received vancomycin  Serum creatinine slowly rising but remains nonoliguric  Still no need for renal replacement therapy  Okay to give diuretics prn  Creatinine 3.0 today  Nonoliguric.  Put out 1500 mL last 24 hours  Mild hypokalemia.  On replacement  Anemia.  Status post blood transfusion.    Acid-base status appears to be stable  Acute hypoxic respiratory failure: S/p extubation  Essential hypertension  IDDM  Proteinuria: ?  Diabetic nephropathy  Infected right hip status post surgery  Discussed with ICU RN  Discussed with ICU    Samson Morales MD  [facility]    This report has been created using voice recognition software. It may contain minor errors which are inherent in voice recognition technology  
respiratory failure  HPI: Patient is an 88 year old white male reformed smoker.  He has a history CAD with prior MI with residual mild cardiomyopathy with baseline EF of 45%.  He has hyperlipidemia, HTN, DM II with peripheral neuropathy, COPD, paroxysmal atrial fibrillation, prostate cancer, and history of bladder cancer.  Patient was initially seen at an outlying facility where he was admitted for right hip revision on 11/27/23.  Reportedly he had an infected joint with prior antibiotic spacer. He developed progressive hypoxemia requiring high flow oxygen.  He was received in transfer to [facility] on 12/8/23 where CXR showed diffuse bilateral infiltrates.  Patient was started on vancomycin and Zosyn for suspected pneumonia.  Given history of cardiomyopathy he was also diuresed.  CT chest showed emphysematous pulmonary fibrosis with multiple rows of honeycomb formations bilaterally and diffuse infiltrates.  Patient continued to deteriorate with hypoxemia and tachypnea.  He was transferred to the ICU on 12/12/23 and intubated.  Lavage of lungs showed E coli on PCR.  Patient was started on Rocephin 2 Gm a day on 12/12/23.  Patient completed Rocephin.  Subsequent cultures grew Pseudomonas on 12/17/23.  Patient started on Zosyn 12/19/23.  Antibiotics converted to cefepime 12/21/23 based upon sensitivities.  Patient extubated 12/21/23.  On supplemental oxygen.  Patient developed delirium.  For further details, please see assessment and plan.  ROS: Unable to obtain secondary to delirium.  PMH:  Per HPI  SHX:  Former smoker.  Unknown pack years.  FHX: Unknown.  Allergies: NKDA  Medications:     sodium chloride      heparin (PORCINE) Infusion 21 Units/kg/hr (12/22/23 1113)    dextrose        sodium chloride (Inhalant)  4 mL Nebulization BID    potassium bicarb-citric acid  20 mEq Oral Daily    cefepime  1,000 mg IntraVENous Q12H    cloNIDine  0.1 mg Oral BID    [Held by provider] furosemide  20 mg IntraVENous Daily    
revision hip arthroplasty; perineum stage II, buttock stage I and II, coccyx stage II)stage 1 pressure ulcer ear    Current Nutrition Intake & Therapies:      Diet NPO  ADULT TUBE FEEDING; Nasogastric; Renal Formula; Continuous; 25; Yes; 10; Q 4 hours; 45; 100; Q 4 hours  Current Tube Feeding (TF) Orders:  Feeding Route: Nasogastric  Formula: Renal Formula (resumed 12/26 once NGT placed)  Schedule: Continuous  Feeding Regimen: Nepro goa of 45ml/hour  Additives/Modulars: None  Water Flushes: per Physician - was 100ml every 4h per renal service on 12/20/23  Goal TF & Flush Orders Provides: 1912 kcals, 87gms protein, 173gms cho, 27gms fiber, 785ml free H20 (1385 with MD flush) in 1080ml total volume (1680 with MD flush)/24h    Anthropometric Measures:  Height: 172.7 cm (5' 8\")  Ideal Body Weight (IBW): 154 lbs (70 kg)    Admission Body Weight: 84.4 kg (186 lb 1.1 oz) (12/8; no edema)  Current Body Weight: 75.8 kg (167 lb) (12/26 with +1-2 edema),     Weight Source: Bed Scale  Current BMI (kg/m2): 25.4  Usual Body Weight:  (no weight records per EMR)   BMI Categories: Overweight (BMI 25.0-29.9)    Estimated Daily Nutrient Needs:  Energy Requirements Based On: Kcal/kg  Weight Used for Energy Requirements:  (77kgm on 12/13)  Energy (kcal/day): 3372-9115 kcals (20-25)  Weight Used for Protein Requirements: Ideal (70kgm)  Protein (g/day): 70-84 grams (1-1.2)  Method Used for Fluid Requirements: Other (Comment)  Fluid (ml/day): as per Physician    Nutrition Diagnosis:   Inadequate oral intake related to cognitive or neurological impairment as evidenced by NPO or clear liquid status due to medical condition, nutrition support - enteral nutrition    Nutrition Interventions:   Food and/or Nutrient Delivery: Continue NPO, Continue Current Tube Feeding  Nutrition Education/Counseling: Education not appropriate (12/26 pt. confused)  Coordination of Nutrition Care: Continue to monitor while inpatient, Interdisciplinary Rounds   
succinate  50 mg Oral Daily    insulin lispro  0-4 Units SubCUTAneous TID WC    insulin lispro  0-4 Units SubCUTAneous Nightly    ipratropium 0.5 mg-albuterol 2.5 mg  1 Dose Inhalation Q4H WA RT    pantoprazole  40 mg Oral QAM AC    amLODIPine  10 mg Oral Daily    [Held by provider] furosemide  20 mg Oral Daily    aspirin  81 mg Oral Daily    tamsulosin  0.4 mg Oral Daily    ferrous sulfate  325 mg Oral BID WC    predniSONE  40 mg Oral Daily    acetylcysteine  600 mg Inhalation BID RT       Lab Data :  CBC:   Recent Labs     12/11/23  0344 12/12/23  0019 12/12/23  0408   WBC 15.3* 17.9* 12.0*   HGB 8.7* 8.9* 8.5*   HCT 27.5* 30.0* 27.2*    352 312       CMP:  Recent Labs     12/10/23  0330 12/12/23  0017 12/12/23  0408   * 136 137   K 4.8 3.9 4.2   CL 93* 98 98   CO2 27 24 27   BUN 81* 83* 79*   CREATININE 3.6* 3.0* 3.0*   GLUCOSE 236* 177* 165*   CALCIUM 7.5* 7.5* 7.4*   MG  --   --  2.0       Hepatic:   Recent Labs     12/11/23  0725   LABALBU 2.8*         Assessment and Plan:  Renal -acute kidney injury most likely multifactorial etiology probably from sepsis he was getting some vancomycin as well not sure what his home medications were  High urine lites show significant proteinuria also several RBCs noted but it is Mar sample  Currently does not look overtly congestive heart failure.  As needed diuretic  Creatinine slightly better follow-up for now    Electrolytes -within normal limits  Acid-base status appears to be stable  Acute hypoxic respiratory failure on high flow nasal cannula does not appear to be in significant congestion.  As needed diuretics.  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 
throughout session to stay on task and he cont to demonstrate decreased insight to his deficits     PAIN: 4/10: at right thigh     Vitals: Blood Pressure: 124/46  Oxygen: 6L O2- pt did drop to 85% upon sitting pt did recover in the 90's with deep breathing- nursing notified and aware   Heart Rate: 100's  Respiratory Rate: teens to 20's throughout session     OBJECTIVE:  Bed Mobility:  Rolling to Left: Minimal Assistance   Rolling to Right: Moderate Assistance   Supine to Sit: Moderate Assistance, of one and min assist of another w/ HOB elevated and extra time   Sit to Supine: Moderate Assistance, X 2, at trunk and LEs- once in bed pt needed max assist to reposition trunk as he was leaning to the right he did complete bridges and needed max assist to reposition hips in bed ** pt cont to demonstrate a right lean while in supine as he was the previous date     Scooting: Maximum Assistance, to scoot hips to edge of bed   Balance:  Pt sat edge of bed for 20 min he demonstrated right and post lean needing min to mod assist x 1 and CGA to SBA of another to help hold midline position- pt completed UE and LE ex while sitting edge of bed     Exercise:  Patient was guided in 1 set(s) 10 reps of exercise to both lower extremities.  Ankle pumps, Long arc quads, and stretched shabbir heelcords 3x30 sec- pt completed UE ex with OT  .  Exercises were completed for increased independence with functional mobility.    Functional Outcome Measures: Completed  AM-PAC Inpatient Mobility without Stair Climbing Raw Score : 7  AM-PAC Inpatient without Stair Climbing T-Scale Score : 28.66  Modified [place] Scale:  Not Applicable    ASSESSMENT:  Assessment: Patient progressing toward established goals. However pt cont to demonstrate decreased balance and decreased strength needing 2 persons for mobility he was able to tolerate increased time sitting edge of bed this date and anticipate will separate PT/OT sessions for next date. Pt would benefit 
assess pending progress, Patient would benefit from continued therapy after discharge Subacte/Skilled Nursing Facility and LTACH    PLAN: Current Treatment Recommendations: Strengthening, Balance training, Functional mobility training, Endurance training, Patient/Caregiver education & training, Therapeutic activities, Safety education & training, Positioning  General Plan:  (5x EM)    Patient Education  Patient Education: Plan of Care, Functional Mobility, Reviewed Prior Education, Health Promotion and Wellness Education, Safety, Verbal Exercise Instruction    Goals:  Patient Goals : does not state  Short Term Goals  Time Frame for Short Term Goals: by discharge  Short Term Goal 1: Pt to transfer supine <--> sit min A to enable pt to get in/out of bed.  Short Term Goal 2: Pt to sit EOB with SBA for improved balance EOB.  Short Term Goal 3: PT to assess transfers/gait.  Long Term Goals  Time Frame for Long Term Goals : NA due to short length of stay.    Following session, patient left in safe position with all fall risk precautions in place.    
dextrose        OLANZapine  7.5 mg Oral Nightly    cloNIDine  0.1 mg Oral q8h    sodium chloride (Inhalant)  4 mL Nebulization BID    potassium bicarb-citric acid  20 mEq Oral Daily    lansoprazole  30 mg Orogastric QAM AC    docusate  100 mg Per NG tube Daily    tiotropium-olodaterol  4 puff Inhalation Daily    [Held by provider] gabapentin  300 mg Oral BID    polyethylene glycol  17 g Oral Daily    insulin glargine  4 Units SubCUTAneous QAM    pravastatin  10 mg Oral Nightly    insulin lispro  0-4 Units SubCUTAneous TID WC    insulin lispro  0-4 Units SubCUTAneous Nightly    amLODIPine  10 mg Oral Daily    [Held by provider] aspirin  81 mg Oral Daily    tamsulosin  0.4 mg Oral Daily       Vital Signs:   T: 98.4: P: 77 RR: 16 B/P: 141/62: FiO2: 6L: O2 Sat:92: I/O: 1045/1175 GCS: 14  Body mass index is 26.92 kg/m²..  General: Chronically ill white male.  HEENT:  normocephalic and atraumatic.  No scleral icterus. PERR  Neck: supple.  No Thyromegaly.  Lungs: clear to auscultation.  No retractions  Cardiac: RRR.  No JVD.  Abdomen: soft.  Nontender.  Extremities:  No clubbing, cyanosis x 4.  1+ LE edema bilaterally.  1+ upper extremity edema bilaterally.  Vasculature: capillary refill < 3 seconds. Palpable dorsalis pedis pulses.  Skin:  warm and dry.  Psych:  Awake.  Oriented to person, place, and circumstance.   Lymph:  No supraclavicular adenopathy.  Neurologic:  No focal deficit. No seizures.    Data: (All radiographs, tracings, PFTs, and imaging are personally viewed and interpreted unless otherwise noted).   Telemetry shows sinus rhythm.  WBC 15.1, Hgb 7.5, plt 227.  Sodium 145, potassium 3.9, choride 109, bicarb 27, BUN 80, creatinine 3.3.    CC time 25 minutes.  Time was discontiguous and does not include procedures. Case discussed with Dr. Wolf.  Electronically signed by Miguel Rees M.D.                                             
discharge Subacte/Skilled Nursing Facility and LTACH    PLAN: Current Treatment Recommendations: Strengthening, Balance training, Functional mobility training, Endurance training, Patient/Caregiver education & training, Therapeutic activities, Safety education & training, Positioning  General Plan:  (5x EM)    Patient Education  Patient Education: Plan of Care, Functional Mobility, Reviewed Prior Education, Health Promotion and Wellness Education, Safety, Verbal Exercise Instruction    Goals:  Patient Goals : does not state  Short Term Goals  Time Frame for Short Term Goals: by discharge  Short Term Goal 1: Pt to transfer supine <--> sit min A to enable pt to get in/out of bed.  Short Term Goal 2: Pt to sit EOB with SBA for improved balance EOB.  Short Term Goal 3: PT to assess transfers/gait.  Long Term Goals  Time Frame for Long Term Goals : NA due to short length of stay.    Following session, patient left in safe position with all fall risk precautions in place.    
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  Patient experienced postoperative hypoxia necessitating high flow nasal cannula and was unable to wean appropriately.  Based on this, patient was transferred to [facility].  Patient has noticed on his proxy decision-maker should he be unable to make decisions himself.  Rapid response was called on 12/12 at 0006 a.m.  Patient desaturated despite being on nonrebreather mask and was severely agitated.  Patient was placed on 100% FiO2 60 L high flow nasal cannula with improvement in oxygenation status.  Discussed with patient's the risks and benefits of intubation.  Patient was alert and oriented x 3, voiced understanding, and stated consent for this procedure. Remains on ventilator undergoing pneumonia therapy. Noted to have continued improvement on ventilatory requirements. Planning for extubation 12/21.  For further details please see assessment and plan above     ROS: Sedated on MV  PMH:  Per HPI  SHX:  Patient is a former smoker.  He denies alcohol use.  Denies drug use.  FHX: Unknown  Allergies: NKDA  Medications:     propofol 20 mcg/kg/min (12/21/23 0717)    heparin (PORCINE) Infusion 21 Units/kg/hr (12/21/23 0718)    dextrose        sodium chloride (Inhalant)  4 mL Nebulization BID    potassium bicarb-citric acid  20 mEq Oral Daily    cefepime  1,000 mg IntraVENous Q12H    cloNIDine  0.1 mg Oral BID    [Held by provider] furosemide  20 mg IntraVENous Daily    lansoprazole  30 mg Orogastric QAM AC    docusate  100 mg Per NG tube Daily    tiotropium-olodaterol  4 puff Inhalation Daily    gabapentin  300 mg Oral BID    polyethylene glycol  17 g Oral Daily    insulin glargine  4 Units SubCUTAneous QAM    pravastatin  10 mg Oral Nightly    insulin lispro  0-4 Units SubCUTAneous TID WC    insulin lispro  0-4 Units SubCUTAneous Nightly    amLODIPine  10 mg Oral Daily    aspirin  81 mg 
provider] aspirin chewable tablet 81 mg, 81 mg, Oral, Daily  tamsulosin (FLOMAX) capsule 0.4 mg, 0.4 mg, Oral, Daily  acetaminophen (TYLENOL) tablet 650 mg, 650 mg, Oral, Q4H PRN  ondansetron (ZOFRAN) injection 4 mg, 4 mg, IntraVENous, Q6H PRN  melatonin tablet 3 mg, 3 mg, Oral, Nightly PRN  oxyCODONE-acetaminophen (PERCOCET) 5-325 MG per tablet 0.5 tablet, 0.5 tablet, Oral, Q4H PRN **OR** oxyCODONE-acetaminophen (PERCOCET) 5-325 MG per tablet 1 tablet, 1 tablet, Oral, Q4H PRN        PLAN    Right thigh hematoma stable, compartments soft, painless ROM  Activities as tolerated RLE  ROMAT RLE  Ice as patient allows to R hip  Orthopaedics to sign off, available as needed while in house  
PLOF.  Performance deficits / Impairments: Decreased functional mobility , Decreased ROM, Decreased safe awareness, Decreased endurance, Decreased balance, Decreased high-level IADLs, Decreased cognition, Decreased strength, Decreased ADL status  Prognosis: Fair  REQUIRES OT FOLLOW-UP: Yes  Decision Making: High Complexity    Treatment Initiated: Treatment and education initiated within context of evaluation.  Evaluation time included review of current medical information, gathering information related to past medical, social and functional history, completion of standardized testing, formal and informal observation of tasks, assessment of data and development of plan of care and goals.  Treatment time included skilled education and facilitation of tasks to increase safety and independence with ADL's for improved functional independence and quality of life.    Discharge Recommendations:  Continue to assess pending progress    Patient Education:     Patient Education  Education Given To: Patient  Education Provided: Role of Therapy, Plan of Care  Education Method: Demonstration, Verbal  Barriers to Learning: Cognition  Education Outcome: Unable to verbalize, Continued education needed, Unable to demonstrate understanding    Equipment Recommendations:  Equipment Needed:  (CONTINUE TO ASSESS)    Plan:  Times Per Week: 5x  Current Treatment Recommendations: Strengthening, ROM, Balance training, Functional mobility training, Endurance training, Cognitive reorientation, Patient/Caregiver education & training, Safety education & training, Self-Care / ADL.  See long-term goal time frame for expected duration of plan of care.  If no long-term goals established, a short length of stay is anticipated.    Goals:  Patient goals : Unable to voice any goals at this time  Short Term Goals  Time Frame for Short Term Goals: until discharge  Short Term Goal 1: Patient will sequence simple BADL with no more than 2 cues with CGA to 
restrictive device.  Short Term Goal 5: Patient will tolerate additional assessment by OTR for functional ambulation when patient can tolerate.  Long Term Goals  Time Frame for Long Term Goals : None due to ELOS.    Following session, patient left in safe position with all fall risk precautions in place.        
with SBA for improved balance EOB.  Short Term Goal 3: PT to assess transfers/gait.  Long Term Goals  Time Frame for Long Term Goals : NA due to short length of stay.    Following session, patient left in safe position with all fall risk precautions in place.    
72 hours.  BNP: No results for input(s): \"BNP\" in the last 72 hours.  INR: No results for input(s): \"INR\", \"PROTIME\" in the last 72 hours.  POC   Recent Labs     12/23/23  0824 12/23/23  1051 12/23/23  1711   POCGLU 163* 150* 167*     No results for input(s): \"LACTA\" in the last 72 hours.    CT scan of abdomen and pelvis with out IV contrast:  Abdomen and pelvis:  1. There is a small amount of strandy opacity within the presacral region of indeterminate etiology which could represent a possible small collection of fluid or hematoma versus scarring.     Right hip:  2. There are multiple loculated mixed attenuation areas involving the vastus lateralis as well as a loculated hypoechoic area within the posterior hamstring compartment causing mass effect upon the adjacent hamstring musculature, abductor loly and   gracilis. These may represent multiloculated abscesses versus hematomas. The area posteriorly is incompletely visualized along its inferior extent. The loculated heterogeneous collection along the posterior compartment measures approximately 12 x 10 x 8   cm. The heterogeneous area along the lateral aspect of the proximal right femur in the region of the vastus lateralis measures approximately 6 x 10 cm in oblique AP and transverse dimensions respectively and measures approximately 11 cm in the   craniocaudal dimension.    Plan:  Will consult Dr. Irvin Aguirre MD from orthopedic surgery service for further evaluation of abnormal right hip CT scan and to clear patient to start back on anticoagulation therapy.  I spoke with patient son over phone today morning and informed about patient's current critical condition and prognosis.    Electronically signed by   Adrienne Olvera MD on 12/23/2023 at 7:20 PM                    
mod cues to improve expression of wants/needs in current medical context.  Goal 2: Patient will execute 1-step commands with 25% accuracy, mod cues for improved comprehension for participation in medical setting.  Goal 3: Patient will achieve consistent vertical+horizontal visual scanning to ascertain potential inclusion of low-tech, static communication modalities for conveying of higher level thought.  Goal 4: Patient will consume therapeutic PO trials following completion of comprehensive oral care with consistent swallow initiation to determine readiness for instrumental evaluation following prolonged intubation.  Goal 5: Patient will complete instrumental evaluation to further assess pharyngeal swallow function and update POC as clinically indicated.    LONG TERM GOALS:  No long term goals recommended d/t short SHAE Gupta M.A., CCC-SLP [id number]        
negative.    Physical Exam:    BP (!) 118/56   Pulse 56   Temp 99.3 °F (37.4 °C) (Oral)   Resp 24   Ht 1.727 m (5' 8\")   Wt 79.9 kg (176 lb 2.4 oz)   SpO2 94%   BMI 26.78 kg/m²     General: pleasant, moderately built, elderly,  male, alert, cooperative, in no acute distress on presentation, on HHFNC.  Eyes:  PERRLA. Nonicteric, no conjunctivitis, EOMs intact.   HENT: Normocephalic, atraumatic. Nares normal. Oral mucosa moist.  Hearing grossly intact.   Neck: Supple, with full range of motion. No gross JVD appreciated.  Respiratory:  Increased effort, obvious accessory muscle use. Diffuse crackles bilaterally.  Cardiovascular: RRR, good S1/S2. No rubs, gallops. Systolic murmur present. No lower extremity edema.   Abdomen: Soft, non-tender, non-distended. Bowel sounds present.  Musculoskeletal: No joint swelling or tenderness. Normal tone. No abnormal movements.   Skin: Warm and dry. No rashes or lesions.  Neurologic:  No focal sensory/motor deficits in the upper or lower extremities. Cranial nerves:  grossly non-focal 2-12.     Psychiatric: Alert and oriented, normal insight and thought content.   Capillary Refill: Brisk,< 3 seconds.  Peripheral Pulses: +2 palpable, equal bilaterally.     Labs:     Recent Labs     12/08/23  1603 12/09/23  0336 12/09/23  1256   WBC 10.8 9.0 8.7   HGB 8.3* 9.2* 8.9*   HCT 26.1* 30.0* 27.4*    319 319       Recent Labs     12/09/23  1021 12/09/23  1854 12/10/23  0330   * 134* 132*   K 4.6  4.6 4.6 4.8   CL 94* 94* 93*   CO2 23 26 27   BUN 69* 74* 81*   CREATININE 3.2* 3.4* 3.6*   CALCIUM 7.9* 7.9* 7.5*       Recent Labs     12/08/23  1603   AST 15   ALT 7*   BILITOT 0.3   ALKPHOS 87       Recent Labs     12/09/23  1256   INR 1.31*       Recent Labs     12/08/23  1829   CKTOTAL 53*       Lab Results   Component Value Date/Time    NITRU NEGATIVE 12/10/2023 02:15 PM    WBCUA 5-9 12/10/2023 02:15 PM    BACTERIA NONE SEEN 12/10/2023 02:15 PM    RBCUA 50-75 
Eliquis as early as possible  Code Status: Full Code  Disposition: admit to inpatient.     Thank you Brenda Prajapati MD for the opportunity to be involved in this patient's care.    Case discussed with Attending, Dr. Wm Alonso.     Electronically signed by Araceli Ghotra MD, IM-PGY2 on 12/9/2023 at 6:06 PM.    
contain minor errors which are inherent in voice recognition technology.**      Final report electronically signed by Dr. Juan A Fong on 12/23/2023 3:54 PM      CT RECONSTRUCTION WO POST PROCESS   Final Result      Abdomen and pelvis:   1. There is a small amount of strandy opacity within the presacral region of indeterminate etiology which could represent a possible small collection of fluid or hematoma versus scarring.         Right hip:   2. There are multiple loculated mixed attenuation areas involving the vastus lateralis as well as a loculated hypoechoic area within the posterior hamstring compartment causing mass effect upon the adjacent hamstring musculature, abductor loly and    gracilis. These may represent multiloculated abscesses versus hematomas. The area posteriorly is incompletely visualized along its inferior extent. The loculated heterogeneous collection along the posterior compartment measures approximately 12 x 10 x 8    cm. The heterogeneous area along the lateral aspect of the proximal right femur in the region of the vastus lateralis measures approximately 6 x 10 cm in oblique AP and transverse dimensions respectively and measures approximately 11 cm in the    craniocaudal dimension.      **This report has been created using voice recognition software.  It may contain minor errors which are inherent in voice recognition technology.**      Final report electronically signed by Dr. Juan A Fong on 12/23/2023 3:54 PM      XR CHEST PORTABLE   Final Result   Impression:   1. Mild bilateral interstitial/alveolar infiltrate or edema, in background    of chronic interstitial lung disease.   2. Stable small right pleural effusion.      This document has been electronically signed by: Raffy Kilgore MD on    12/23/2023 05:02 AM      XR ABDOMEN FOR NG/OG/NE TUBE PLACEMENT   Final Result   1. Enteric tube (NG) tip overlies the stomach fundus.      This document has been electronically signed by: Adolfo Oviedo 
swallow anatomy and physiology via use of computer monitor. ST defined aspiration and penetration and explained risk factors associated with aspiration (pneumonia, pulmonary decline, respiratory failure, extended/frequent hospitalizations, etc). ST then reviewed results from MBS. ST discussed diet recommendations as well as recommended swallow strategies to improve safety and efficiency. ST provided skilled instruction re: proper technique and rationale for effortful swallow. Patient completed effortful swallow x5 with fair success. Recommend completion of HEP to improve overall pharyngeal swallow function. Patient stated understanding; however, frustrated with MBS results and recommendations at this time.       LONG TERM GOALS:  No long term goals recommended d/t short SHAE Gupta M.A., CCC-SLP [id number]        
PLACEMENT   Final Result   1. Enteric tube (NG) tip overlies the stomach fundus.      This document has been electronically signed by: Adolfo Oviedo MD on    12/22/2023 07:24 PM      XR CHEST PORTABLE   Final Result   Impression:   Interval extubation with subtle new area of infiltrate in the left mid    lung.      This document has been electronically signed by: Epi Saba III, MD on    12/21/2023 09:19 PM      XR CHEST PORTABLE   Final Result   No significant interval change.      This document has been electronically signed by: Juan Perez MD on    12/21/2023 03:04 AM      XR CHEST PORTABLE   Final Result   Impression:   1. Stable abnormal chest.      This document has been electronically signed by: Daisha López DO on    12/20/2023 05:08 AM      XR CHEST PORTABLE   Final Result   Impression:   1. Stable abnormal chest.      This document has been electronically signed by: Daisha López DO on    12/19/2023 04:39 AM      XR CHEST PORTABLE   Final Result      No significant interval change.      This document has been electronically signed by: Cristian Al MD on    12/18/2023 03:33 AM      XR CHEST PORTABLE   Final Result      No significant interval change.      This document has been electronically signed by: Cristian Al MD on    12/17/2023 02:26 AM      XR CHEST PORTABLE   Final Result      No significant interval change.      This document has been electronically signed by: Cristian Al MD on    12/16/2023 04:36 AM      XR CHEST PORTABLE   Final Result      No significant interval change.      This document has been electronically signed by: Cristian Al MD on    12/15/2023 06:35 AM      XR CHEST PORTABLE   Final Result      No significant interval change.      This document has been electronically signed by: Cristian Al MD on    12/14/2023 03:30 AM      XR CHEST PORTABLE   Final Result   1. Diffuse bilateral parenchymal and interstitial opacities are again seen and appear slightly progressed at the right 
  2. There has been interval intubation with the distal tip overlying the trachea approximately 6.2 cm above the jorge.      3. There is an enteric tube coursing below the level of the diaphragm with the tip overlying the gastric body.      4. There is thickening of the right lateral pleural stripe and mild blunting of right costophrenic angle consistent with a small loculated right pleural effusion. This is unchanged from prior examination.               **This report has been created using voice recognition software.  It may contain minor errors which are inherent in voice recognition technology.**      Final report electronically signed by Dr. Juan A Fong on 12/12/2023 11:21 AM      XR CHEST PORTABLE   Final Result   Increased airspace disease with areas of consolidation, which could be due    to pulmonary edema and/or pneumonia.   Bilateral pleural effusions.   Nonemergent/incidental findings in the report.      This document has been electronically signed by: Manolo Payne MD on    12/12/2023 01:25 AM      US CHEST INCLUDING MEDIASTINUM   Final Result   1. There is only a very small amount of pleural fluid present within the right pleural space. This is insufficient for thoracentesis at this time.            **This report has been created using voice recognition software.  It may contain minor errors which are inherent in voice recognition technology.**      Final report electronically signed by Dr. Juan A Fong on 12/12/2023 7:05 AM      NM LUNG VENT/PERFUSION (VQ)   Final Result      Nondiagnostic lung scan for pulmonary embolism. If there is clinical concern for pulmonary embolism, CTA of the chest is recommended for further evaluation.      Final report electronically signed by Dr. Gregory Betancur on 12/11/2023 2:15 PM      CT CHEST WO CONTRAST   Final Result   1. Small bilateral pleural effusions and bilateral pleural thickening.   2. Extensive bilateral fibrosis.   3. Cardiomegaly.      Final report 
of medical care. Face to face evaluation and examination was performed. Case discussed with , Ashwini GRANT DO-resident physician. Agree with resident's findings and plan as documented in the resident's note. Italicized font, if present,  represents changes to the note made by me.   More than 50% of the encounter time involved with patient care by the Pulmonary & Critical care service team spent by me.    Please see my modifications mentioned below:  He is on HI Flow.  Not in any acute distress  I spoke with his son at bed side. All questions were answered  Code status changed to DNRcc.  -Will sign off on the case from pulmonary point of view.   -Will follow PRN.   -Call [phone] with questions.      Electronically signed by   Adrienne Olvera MD on 1/1/2024 at 1:10 PM

## 2024-01-02 NOTE — PLAN OF CARE
Patient Weaning Progress    The patient's vent settings was able to be weaned this shift.      Ventilator settings that were weaned              [x] Mode   [] Pressure support weaned   [] Fio2 weaned   [] Peep weaned      Spontaneous weaning trial  was attempted.     due to defined parameters for SBT (spontaneous breathing trial) not being met.     *Results of SBT documented under SBTOUTCOME note.    Reason that defined ventilator parameters for SBT was not met              [] Patient condition requires increased ventilator settings  [] Requires increased sedation   [] Settings not within weaning range   [] SAT not completed   [] Physician orders    The patient was able to tolerate SBT.       Evac tube was  hooked up with continuous low suction(20-30mmHg)      Cuff was  deflated to determine cuff leak. A leak  was detected.       Unable to get agreement for goals because no family is present and patient cannot respond.     
                                             Patient Weaning Progress    The patient's vent settings was able to be weaned this shift.      Ventilator settings that were weaned              [x] Mode   [] Pressure support weaned   [] Fio2 weaned   [] Peep weaned      Spontaneous weaning trial  was attempted.     due to defined parameters for SBT (spontaneous breathing trial) not being met.     *Results of SBT documented under SBTOUTCOME note.    Reason that defined ventilator parameters for SBT was not met              [] Patient condition requires increased ventilator settings  [] Requires increased sedation   [] Settings not within weaning range   [] SAT not completed   [] Physician orders    The patient was able to tolerate SBT.   Evac tube was  hooked up with continuous low suction(20-30mmHg)              Unable to get agreement for goals because no family is present and patient cannot respond.     
                                             Patient Weaning Progress    The patient's vent settings was not able to be weaned this shift.      Ventilator settings that were weaned              [] Mode   [] Pressure support weaned   [] Fio2 weaned   [] Peep weaned      Spontaneous weaning trial  was not attempted.     due to defined parameters for SBT (spontaneous breathing trial) not being met.     *Results of SBT documented under SBTOUTCOME note.    Reason that defined ventilator parameters for SBT was not met              [] Patient condition requires increased ventilator settings  [] Requires increased sedation   [] Settings not within weaning range   [] SAT not completed   [] Physician orders        Evac tube was  hooked up with continuous low suction(20-30mmHg)      Cuff was  deflated to determine cuff leak. A leak  was detected.       .    Unable to get agreement for goals because no family is present and patient cannot respond.     
  Problem: Discharge Planning  Goal: Discharge to home or other facility with appropriate resources  12/11/2023 1125 by Jaden Kemp RN  Outcome: Progressing  12/11/2023 0109 by Stacey Betancur RN  Outcome: Progressing  Flowsheets (Taken 12/11/2023 0109)  Discharge to home or other facility with appropriate resources:   Arrange for needed discharge resources and transportation as appropriate   Identify barriers to discharge with patient and caregiver   Identify discharge learning needs (meds, wound care, etc)     Problem: Pain  Goal: Verbalizes/displays adequate comfort level or baseline comfort level  12/11/2023 1125 by Jaden Kemp RN  Outcome: Progressing  12/11/2023 0109 by Stacey Betancur RN  Outcome: Progressing     Problem: ABCDS Injury Assessment  Goal: Absence of physical injury  12/11/2023 1125 by Jaden Kemp RN  Outcome: Progressing  12/11/2023 0109 by Stacey Betancur RN  Outcome: Progressing  Flowsheets (Taken 12/11/2023 0109)  Absence of Physical Injury: Implement safety measures based on patient assessment     Problem: Safety - Adult  Goal: Free from fall injury  12/11/2023 1125 by Jaden Kemp RN  Outcome: Progressing  12/11/2023 0109 by Stacey Betancur RN  Outcome: Progressing     Problem: Skin/Tissue Integrity  Goal: Absence of new skin breakdown  Description: 1.  Monitor for areas of redness and/or skin breakdown  2.  Assess vascular access sites hourly  3.  Every 4-6 hours minimum:  Change oxygen saturation probe site  4.  Every 4-6 hours:  If on nasal continuous positive airway pressure, respiratory therapy assess nares and determine need for appliance change or resting period.  12/11/2023 0109 by Stacye Betancur RN  Outcome: Progressing          
  Problem: Discharge Planning  Goal: Discharge to home or other facility with appropriate resources  12/13/2023 0633 by Araceli Madden RN  Outcome: Progressing  Flowsheets (Taken 12/12/2023 1759 by Yanira Sanderson, RN)  Discharge to home or other facility with appropriate resources:   Identify barriers to discharge with patient and caregiver   Identify discharge learning needs (meds, wound care, etc)   Arrange for needed discharge resources and transportation as appropriate   Refer to discharge planning if patient needs post-hospital services based on physician order or complex needs related to functional status, cognitive ability or social support system     Problem: Pain  Goal: Verbalizes/displays adequate comfort level or baseline comfort level  12/13/2023 0633 by Araceli Madden RN  Outcome: Progressing  Flowsheets (Taken 12/12/2023 1759 by Yanira Sanderson, RN)  Verbalizes/displays adequate comfort level or baseline comfort level:   Assess pain using appropriate pain scale   Administer analgesics based on type and severity of pain and evaluate response   Implement non-pharmacological measures as appropriate and evaluate response   Notify Licensed Independent Practitioner if interventions unsuccessful or patient reports new pain     Problem: Respiratory - Adult  Goal: Achieves optimal ventilation and oxygenation  12/13/2023 0633 by Araceli Madden RN  Outcome: Progressing  Flowsheets (Taken 12/12/2023 1759 by Yanira Sanderson, RN)  Achieves optimal ventilation and oxygenation:   Assess for changes in respiratory status   Position to facilitate oxygenation and minimize respiratory effort   Oxygen supplementation based on oxygen saturation or arterial blood gases   Assess the need for suctioning and aspirate as needed   Respiratory therapy support as indicated     Problem: Infection - Adult  Goal: Absence of infection during hospitalization  12/13/2023 0633 by Araceli Madden RN  Outcome: 
  Problem: Discharge Planning  Goal: Discharge to home or other facility with appropriate resources  12/13/2023 1342 by Keny Cabrera, RN  Outcome: Progressing  Flowsheets (Taken 12/12/2023 1759 by Yanira Sanderson, RN)  Discharge to home or other facility with appropriate resources:   Identify barriers to discharge with patient and caregiver   Identify discharge learning needs (meds, wound care, etc)   Arrange for needed discharge resources and transportation as appropriate   Refer to discharge planning if patient needs post-hospital services based on physician order or complex needs related to functional status, cognitive ability or social support system     Problem: Pain  Goal: Verbalizes/displays adequate comfort level or baseline comfort level  12/13/2023 1342 by Keny Cabrera, RN  Outcome: Progressing  Flowsheets (Taken 12/12/2023 1759 by Yanira Sanderson, RN)  Verbalizes/displays adequate comfort level or baseline comfort level:   Assess pain using appropriate pain scale   Administer analgesics based on type and severity of pain and evaluate response   Implement non-pharmacological measures as appropriate and evaluate response   Notify Licensed Independent Practitioner if interventions unsuccessful or patient reports new pain     Problem: ABCDS Injury Assessment  Goal: Absence of physical injury  Outcome: Progressing  Flowsheets (Taken 12/11/2023 0109 by Stacey Betancur, RN)  Absence of Physical Injury: Implement safety measures based on patient assessment     Problem: Safety - Adult  Goal: Free from fall injury  Outcome: Progressing  Flowsheets (Taken 12/9/2023 2242 by Bartolo Lancaster, RN)  Free From Fall Injury: Based on caregiver fall risk screen, instruct family/caregiver to ask for assistance with transferring infant if caregiver noted to have fall risk factors     Problem: Skin/Tissue Integrity  Goal: Absence of new skin breakdown  Description: 1.  Monitor for areas of redness and/or skin 
  Problem: Discharge Planning  Goal: Discharge to home or other facility with appropriate resources  12/17/2023 0031 by Araceli Madden RN  Outcome: Progressing  Flowsheets (Taken 12/16/2023 0800 by Dane Abernathy, RN)  Discharge to home or other facility with appropriate resources: Identify barriers to discharge with patient and caregiver     Problem: Respiratory - Adult  Goal: Achieves optimal ventilation and oxygenation  12/17/2023 0031 by Araceli Madden RN  Outcome: Progressing  Flowsheets (Taken 12/16/2023 1400 by Daniella Shaw RN)  Achieves optimal ventilation and oxygenation:   Assess for changes in respiratory status   Assess for changes in mentation and behavior   Position to facilitate oxygenation and minimize respiratory effort     Problem: Nutrition Deficit:  Goal: Optimize nutritional status  Outcome: Progressing  Flowsheets (Taken 12/13/2023 1514 by Marie Quiñones, RD, LD)  Nutrient intake appropriate for improving, restoring, or maintaining nutritional needs: Assess nutritional status and recommend course of action     Problem: Chronic Conditions and Co-morbidities  Goal: Patient's chronic conditions and co-morbidity symptoms are monitored and maintained or improved  Outcome: Progressing  Flowsheets (Taken 12/16/2023 0800 by Dane Abernathy, RN)  Care Plan - Patient's Chronic Conditions and Co-Morbidity Symptoms are Monitored and Maintained or Improved: Monitor and assess patient's chronic conditions and comorbid symptoms for stability, deterioration, or improvement     Problem: Genitourinary - Adult  Goal: Urinary catheter remains patent  12/17/2023 0031 by Araceli Madden RN  Outcome: Progressing  Flowsheets (Taken 12/16/2023 1400 by Daniella Shaw, RN)  Urinary catheter remains patent: Assess patency of urinary catheter     Problem: Infection - Adult  Goal: Absence of infection during hospitalization  12/17/2023 0031 by Araceli Madden RN  Outcome: 
  Problem: Discharge Planning  Goal: Discharge to home or other facility with appropriate resources  12/17/2023 1011 by Elizabeth Bennett  Outcome: Progressing     Problem: Pain  Goal: Verbalizes/displays adequate comfort level or baseline comfort level  Outcome: Progressing     Problem: ABCDS Injury Assessment  Goal: Absence of physical injury  Outcome: Progressing     Problem: Safety - Adult  Goal: Free from fall injury  Outcome: Progressing     Problem: Skin/Tissue Integrity  Goal: Absence of new skin breakdown  Description: 1.  Monitor for areas of redness and/or skin breakdown  2.  Assess vascular access sites hourly  3.  Every 4-6 hours minimum:  Change oxygen saturation probe site  4.  Every 4-6 hours:  If on nasal continuous positive airway pressure, respiratory therapy assess nares and determine need for appliance change or resting period.  Outcome: Progressing     Problem: Respiratory - Adult  Goal: Achieves optimal ventilation and oxygenation  12/17/2023 1011 by Elizabeth Bennett  Outcome: Progressing    Problem: Genitourinary - Adult  Goal: Urinary catheter remains patent  12/17/2023 1011 by Elizabeth Bennett  Outcome: Progressing     Problem: Infection - Adult  Goal: Absence of infection during hospitalization  12/17/2023 1011 by Elizabeth Bennett  Outcome: Progressing     Problem: Nutrition Deficit:  Goal: Optimize nutritional status  12/17/2023 1011 by Elizabeth Bennett  Outcome: Progressing     Problem: Chronic Conditions and Co-morbidities  Goal: Patient's chronic conditions and co-morbidity symptoms are monitored and maintained or improved  12/17/2023 1011 by Elizabeth Bennett  Outcome: Progressing     Problem: Safety - Medical Restraint  Goal: Remains free of injury from restraints (Restraint for Interference with Medical Device)  Description: INTERVENTIONS:  1. Determine that other, less restrictive measures have been tried or would not be effective before applying the restraint  2. Evaluate the patient's condition at the 
  Problem: Discharge Planning  Goal: Discharge to home or other facility with appropriate resources  12/18/2023 1006 by Kwaku Waldron RN  Outcome: Progressing  Flowsheets (Taken 12/17/2023 2020 by Miguel Angel Darling RN)  Discharge to home or other facility with appropriate resources:   Identify barriers to discharge with patient and caregiver   Arrange for needed discharge resources and transportation as appropriate   Refer to discharge planning if patient needs post-hospital services based on physician order or complex needs related to functional status, cognitive ability or social support system   Identify discharge learning needs (meds, wound care, etc)  12/17/2023 2020 by Miguel Angel Darling RN  Outcome: Progressing  Flowsheets (Taken 12/17/2023 2020)  Discharge to home or other facility with appropriate resources:   Identify barriers to discharge with patient and caregiver   Arrange for needed discharge resources and transportation as appropriate   Refer to discharge planning if patient needs post-hospital services based on physician order or complex needs related to functional status, cognitive ability or social support system   Identify discharge learning needs (meds, wound care, etc)     Problem: Pain  Goal: Verbalizes/displays adequate comfort level or baseline comfort level  12/18/2023 1006 by Kwaku Waldron RN  Outcome: Progressing  Flowsheets (Taken 12/18/2023 1006)  Verbalizes/displays adequate comfort level or baseline comfort level:   Assess pain using appropriate pain scale   Administer analgesics based on type and severity of pain and evaluate response   Implement non-pharmacological measures as appropriate and evaluate response   Consider cultural and social influences on pain and pain management   Notify Licensed Independent Practitioner if interventions unsuccessful or patient reports new pain  12/17/2023 2020 by Miguel Angel Darling RN  Outcome: Progressing  Flowsheets (Taken 12/17/2023 
  Problem: Discharge Planning  Goal: Discharge to home or other facility with appropriate resources  12/21/2023 0922 by Princess Abernathy, RN  Outcome: Progressing  Flowsheets (Taken 12/21/2023 0922)  Discharge to home or other facility with appropriate resources:   Identify barriers to discharge with patient and caregiver   Arrange for needed discharge resources and transportation as appropriate   Arrange for interpreters to assist at discharge as needed   Identify discharge learning needs (meds, wound care, etc)     Problem: Pain  Goal: Verbalizes/displays adequate comfort level or baseline comfort level  12/21/2023 0922 by Princess Abernathy, RN  Outcome: Progressing  Flowsheets (Taken 12/21/2023 0922)  Verbalizes/displays adequate comfort level or baseline comfort level:   Encourage patient to monitor pain and request assistance   Assess pain using appropriate pain scale   Administer analgesics based on type and severity of pain and evaluate response   Implement non-pharmacological measures as appropriate and evaluate response     Problem: ABCDS Injury Assessment  Goal: Absence of physical injury  12/21/2023 0922 by Princess Abernathy, RN  Outcome: Progressing  Flowsheets (Taken 12/21/2023 0922)  Absence of Physical Injury: Implement safety measures based on patient assessment     Problem: Safety - Adult  Goal: Free from fall injury  12/21/2023 0922 by Princess Abernathy, RN  Outcome: Progressing  Flowsheets (Taken 12/21/2023 0922)  Free From Fall Injury:   Based on caregiver fall risk screen, instruct family/caregiver to ask for assistance with transferring infant if caregiver noted to have fall risk factors   Instruct family/caregiver on patient safety     Problem: Skin/Tissue Integrity  Goal: Absence of new skin breakdown  Description: 1.  Monitor for areas of redness and/or skin breakdown  2.  Assess vascular access sites hourly  3.  Every 4-6 hours minimum:  Change oxygen saturation probe site  4.  
  Problem: Discharge Planning  Goal: Discharge to home or other facility with appropriate resources  12/26/2023 1433 by James Moreland RN  Outcome: Progressing  Flowsheets (Taken 12/26/2023 1433)  Discharge to home or other facility with appropriate resources:   Identify barriers to discharge with patient and caregiver   Arrange for needed discharge resources and transportation as appropriate   Identify discharge learning needs (meds, wound care, etc)   Refer to discharge planning if patient needs post-hospital services based on physician order or complex needs related to functional status, cognitive ability or social support system     Problem: Pain  Goal: Verbalizes/displays adequate comfort level or baseline comfort level  Outcome: Progressing  Flowsheets (Taken 12/26/2023 1433)  Verbalizes/displays adequate comfort level or baseline comfort level:   Encourage patient to monitor pain and request assistance   Assess pain using appropriate pain scale   Administer analgesics based on type and severity of pain and evaluate response   Implement non-pharmacological measures as appropriate and evaluate response   Notify Licensed Independent Practitioner if interventions unsuccessful or patient reports new pain     Problem: ABCDS Injury Assessment  Goal: Absence of physical injury  Outcome: Progressing  Flowsheets (Taken 12/26/2023 1433)  Absence of Physical Injury: Implement safety measures based on patient assessment     Problem: Safety - Adult  Goal: Free from fall injury  Outcome: Progressing  Flowsheets (Taken 12/26/2023 1433)  Free From Fall Injury:   Instruct family/caregiver on patient safety   Based on caregiver fall risk screen, instruct family/caregiver to ask for assistance with transferring infant if caregiver noted to have fall risk factors     Problem: Skin/Tissue Integrity  Goal: Absence of new skin breakdown  Description: 1.  Monitor for areas of redness and/or skin breakdown  2.  Assess vascular 
  Problem: Discharge Planning  Goal: Discharge to home or other facility with appropriate resources  12/27/2023 1105 by Elizabeth Bennett  Outcome: Progressing     Problem: Pain  Goal: Verbalizes/displays adequate comfort level or baseline comfort level  12/27/2023 1105 by Elizabeth Bennett  Outcome: Progressing     Problem: ABCDS Injury Assessment  Goal: Absence of physical injury  12/27/2023 1105 by Elizabeth Bennett  Outcome: Progressing     Problem: Safety - Adult  Goal: Free from fall injury  12/27/2023 1105 by Elizabeth Bennett  Outcome: Progressing     Problem: Skin/Tissue Integrity  Goal: Absence of new skin breakdown  Description: 1.  Monitor for areas of redness and/or skin breakdown  2.  Assess vascular access sites hourly  3.  Every 4-6 hours minimum:  Change oxygen saturation probe site  4.  Every 4-6 hours:  If on nasal continuous positive airway pressure, respiratory therapy assess nares and determine need for appliance change or resting period.  12/27/2023 1105 by Elizabeth Bennett  Outcome: Progressing     Problem: Respiratory - Adult  Goal: Achieves optimal ventilation and oxygenation  12/27/2023 1105 by Elizabeth Bennett  Outcome: Progressing     Problem: Genitourinary - Adult  Goal: Urinary catheter remains patent  12/27/2023 1105 by Elizabeth Bennett  Outcome: Progressing     Problem: Infection - Adult  Goal: Absence of infection during hospitalization  12/27/2023 1105 by Elizabeth Bennett  Outcome: Progressing     Problem: Neurosensory - Adult  Goal: Achieves stable or improved neurological status  12/27/2023 1105 by Elizabeth Bennett  Outcome: Progressing     Problem: Musculoskeletal - Adult  Goal: Return mobility to safest level of function  12/27/2023 1105 by Elizabeth Bennett  Outcome: Progressing     Problem: Musculoskeletal - Adult  Goal: Maintain proper alignment of affected body part  12/27/2023 1105 by Elizabeth Bennett  Outcome: Progressing     Problem: Gastrointestinal - Adult  Goal: Minimal or absence of nausea and vomiting  12/27/2023 
  Problem: Discharge Planning  Goal: Discharge to home or other facility with appropriate resources  12/9/2023 0016 by Bartolo Lancaster RN  Outcome: Progressing     Problem: Pain  Goal: Verbalizes/displays adequate comfort level or baseline comfort level  12/9/2023 0016 by Bartolo Lancaster RN  Outcome: Progressing     Problem: ABCDS Injury Assessment  Goal: Absence of physical injury  12/9/2023 0016 by Bartolo Lancaster RN  Outcome: Progressing  Flowsheets (Taken 12/9/2023 0010)  Absence of Physical Injury: Implement safety measures based on patient assessment     Problem: Safety - Adult  Goal: Free from fall injury  12/9/2023 0016 by Bartolo Lancaster RN  Outcome: Progressing  Flowsheets (Taken 12/9/2023 0010)  Free From Fall Injury: Based on caregiver fall risk screen, instruct family/caregiver to ask for assistance with transferring infant if caregiver noted to have fall risk factors     Problem: Skin/Tissue Integrity  Goal: Absence of new skin breakdown  Description: 1.  Monitor for areas of redness and/or skin breakdown  2.  Assess vascular access sites hourly  3.  Every 4-6 hours minimum:  Change oxygen saturation probe site  4.  Every 4-6 hours:  If on nasal continuous positive airway pressure, respiratory therapy assess nares and determine need for appliance change or resting period.  Outcome: Progressing     Problem: Respiratory - Adult  Goal: Achieves optimal ventilation and oxygenation  12/9/2023 0016 by Bartolo Lancaster RN  Outcome: Progressing  Flowsheets (Taken 12/9/2023 0016)  Achieves optimal ventilation and oxygenation:   Assess for changes in respiratory status   Assess for changes in mentation and behavior   Position to facilitate oxygenation and minimize respiratory effort   Oxygen supplementation based on oxygen saturation or arterial blood gases  Note:On heated hiflow at 85%, 30L/min     Problem: Nutrition Deficit:  Goal: Optimize nutritional status  Outcome: Progressing  Flowsheets 
  Problem: Discharge Planning  Goal: Discharge to home or other facility with appropriate resources  12/9/2023 1033 by Mally Campbell RN  Outcome: Progressing  Flowsheets (Taken 12/9/2023 0815)  Discharge to home or other facility with appropriate resources: Identify barriers to discharge with patient and caregiver  12/9/2023 0016 by Bartolo Lancaster RN  Outcome: Progressing     Problem: Pain  Goal: Verbalizes/displays adequate comfort level or baseline comfort level  12/9/2023 1033 by Mally Campbell RN  Outcome: Progressing  12/9/2023 0016 by Bartolo Lancaster RN  Outcome: Progressing  12/8/2023 2106 by Sunitha Palmer RN  Outcome: Progressing  Flowsheets (Taken 12/8/2023 2106)  Verbalizes/displays adequate comfort level or baseline comfort level: Encourage patient to monitor pain and request assistance     Problem: ABCDS Injury Assessment  Goal: Absence of physical injury  12/9/2023 1033 by Mally Campbell RN  Outcome: Progressing  12/9/2023 0016 by Bartolo Lancaster RN  Outcome: Progressing  Flowsheets (Taken 12/9/2023 0010)  Absence of Physical Injury: Implement safety measures based on patient assessment  12/8/2023 2106 by Sunitha Palmer RN  Outcome: Progressing  Flowsheets (Taken 12/8/2023 2106)  Absence of Physical Injury: Implement safety measures based on patient assessment     Problem: Safety - Adult  Goal: Free from fall injury  12/9/2023 1033 by Mally Campbell RN  Outcome: Progressing  12/9/2023 0016 by Bartolo Lancaster RN  Outcome: Progressing  Flowsheets (Taken 12/9/2023 0010)  Free From Fall Injury: Based on caregiver fall risk screen, instruct family/caregiver to ask for assistance with transferring infant if caregiver noted to have fall risk factors  12/8/2023 2106 by Sunitha Palmer RN  Outcome: Progressing  Flowsheets (Taken 12/8/2023 2106)  Free From Fall Injury: Instruct family/caregiver on patient safety     Problem: Skin/Tissue Integrity  Goal: Absence of 
  Problem: Discharge Planning  Goal: Discharge to home or other facility with appropriate resources  12/9/2023 2242 by Bartolo Lancaster RN  Outcome: Progressing  Flowsheets (Taken 12/9/2023 2242)  Discharge to home or other facility with appropriate resources:   Identify barriers to discharge with patient and caregiver   Arrange for needed discharge resources and transportation as appropriate   Refer to discharge planning if patient needs post-hospital services based on physician order or complex needs related to functional status, cognitive ability or social support system     Problem: Pain  Goal: Verbalizes/displays adequate comfort level or baseline comfort level  12/9/2023 2242 by Bartolo Lancaster RN  Outcome: Progressing  Flowsheets (Taken 12/9/2023 2242)  Verbalizes/displays adequate comfort level or baseline comfort level: Encourage patient to monitor pain and request assistance     Problem: ABCDS Injury Assessment  Goal: Absence of physical injury  12/9/2023 2242 by Bartolo Lancaster RN  Outcome: Progressing  Flowsheets  Taken 12/9/2023 2242  Absence of Physical Injury: Implement safety measures based on patient assessment  Taken 12/9/2023 2232  Absence of Physical Injury: Implement safety measures based on patient assessment     Problem: Safety - Adult  Goal: Free from fall injury  12/9/2023 2242 by Bartolo Lancaster RN  Outcome: Progressing  Flowsheets  Taken 12/9/2023 2242  Free From Fall Injury: Based on caregiver fall risk screen, instruct family/caregiver to ask for assistance with transferring infant if caregiver noted to have fall risk factors  Taken 12/9/2023 2232  Free From Fall Injury: Based on caregiver fall risk screen, instruct family/caregiver to ask for assistance with transferring infant if caregiver noted to have fall risk factors     Problem: Skin/Tissue Integrity  Goal: Absence of new skin breakdown  Description: 1.  Monitor for areas of redness and/or skin breakdown  2.  Assess 
  Problem: Discharge Planning  Goal: Discharge to home or other facility with appropriate resources  Outcome: Not Progressing  Flowsheets (Taken 12/16/2023 0800)  Discharge to home or other facility with appropriate resources: Identify barriers to discharge with patient and caregiver     Problem: Pain  Goal: Verbalizes/displays adequate comfort level or baseline comfort level  Outcome: Progressing     Problem: Safety - Adult  Goal: Free from fall injury  Outcome: Progressing     Problem: Respiratory - Adult  Goal: Achieves optimal ventilation and oxygenation  12/16/2023 1117 by Dane Abernathy RN  Outcome: Progressing  Flowsheets (Taken 12/16/2023 0800)  Achieves optimal ventilation and oxygenation:   Assess for changes in respiratory status   Position to facilitate oxygenation and minimize respiratory effort   Oxygen supplementation based on oxygen saturation or arterial blood gases   Assess the need for suctioning and aspirate as needed     Problem: Genitourinary - Adult  Goal: Urinary catheter remains patent  Outcome: Progressing     Problem: Infection - Adult  Goal: Absence of infection during hospitalization  Outcome: Progressing  Flowsheets (Taken 12/16/2023 0800)  Absence of infection during hospitalization:   Assess and monitor for signs and symptoms of infection   Monitor lab/diagnostic results   Monitor all insertion sites i.e., indwelling lines, tubes and drains   Monitor endotracheal (as able) and nasal secretions for changes in amount and color   Administer medications as ordered   Instruct and encourage patient and family to use good hand hygiene technique   Identify and instruct in appropriate isolation precautions for identified infection/condition     Problem: Safety - Medical Restraint  Goal: Remains free of injury from restraints (Restraint for Interference with Medical Device)  Description: INTERVENTIONS:  1. Determine that other, less restrictive measures have been tried or would not be 
  Problem: Discharge Planning  Goal: Discharge to home or other facility with appropriate resources  Outcome: Progressing       Consult received. Please see SW note dated 12/26.  
  Problem: Discharge Planning  Goal: Discharge to home or other facility with appropriate resources  Outcome: Progressing     Problem: Pain  Goal: Verbalizes/displays adequate comfort level or baseline comfort level  Outcome: Progressing     Problem: ABCDS Injury Assessment  Goal: Absence of physical injury  Outcome: Progressing     Problem: Safety - Adult  Goal: Free from fall injury  Outcome: Progressing     Problem: Skin/Tissue Integrity  Goal: Absence of new skin breakdown  Description: 1.  Monitor for areas of redness and/or skin breakdown  2.  Assess vascular access sites hourly  3.  Every 4-6 hours minimum:  Change oxygen saturation probe site  4.  Every 4-6 hours:  If on nasal continuous positive airway pressure, respiratory therapy assess nares and determine need for appliance change or resting period.  Outcome: Progressing     Problem: Respiratory - Adult  Goal: Achieves optimal ventilation and oxygenation  12/25/2023 2155 by Ana Bingham, RN  Outcome: Progressing  12/25/2023 0857 by Eden Lara RCP  Outcome: Progressing  Flowsheets (Taken 12/24/2023 1930 by Perlita Nicholson, RN)  Achieves optimal ventilation and oxygenation:   Assess for changes in respiratory status   Assess for changes in mentation and behavior   Oxygen supplementation based on oxygen saturation or arterial blood gases   Respiratory therapy support as indicated     Problem: Nutrition Deficit:  Goal: Optimize nutritional status  Outcome: Progressing     Problem: Chronic Conditions and Co-morbidities  Goal: Patient's chronic conditions and co-morbidity symptoms are monitored and maintained or improved  Outcome: Progressing     Problem: Genitourinary - Adult  Goal: Urinary catheter remains patent  Outcome: Progressing     Problem: Infection - Adult  Goal: Absence of infection during hospitalization  Outcome: Progressing     Problem: Coping  Goal: Pt/Family able to verbalize concerns and demonstrate effective coping 
  Problem: Discharge Planning  Goal: Discharge to home or other facility with appropriate resources  Outcome: Progressing     Problem: Pain  Goal: Verbalizes/displays adequate comfort level or baseline comfort level  Outcome: Progressing     Problem: ABCDS Injury Assessment  Goal: Absence of physical injury  Outcome: Progressing     Problem: Safety - Adult  Goal: Free from fall injury  Outcome: Progressing     Problem: Skin/Tissue Integrity  Goal: Absence of new skin breakdown  Description: 1.  Monitor for areas of redness and/or skin breakdown  2.  Assess vascular access sites hourly  3.  Every 4-6 hours minimum:  Change oxygen saturation probe site  4.  Every 4-6 hours:  If on nasal continuous positive airway pressure, respiratory therapy assess nares and determine need for appliance change or resting period.  Outcome: Progressing     Problem: Respiratory - Adult  Goal: Achieves optimal ventilation and oxygenation  Outcome: Progressing  Flowsheets (Taken 12/28/2023 0758 by Christine Sanchez, [facility])  Achieves optimal ventilation and oxygenation:   Assess for changes in respiratory status   Respiratory therapy support as indicated  Goal: Clear lung sounds  Outcome: Progressing     Problem: Genitourinary - Adult  Goal: Urinary catheter remains patent  Outcome: Progressing     Problem: Infection - Adult  Goal: Absence of infection during hospitalization  Outcome: Progressing     Problem: Neurosensory - Adult  Goal: Achieves stable or improved neurological status  Outcome: Progressing     Problem: Musculoskeletal - Adult  Goal: Return mobility to safest level of function  Outcome: Progressing  Goal: Maintain proper alignment of affected body part  Outcome: Progressing     Problem: Gastrointestinal - Adult  Goal: Minimal or absence of nausea and vomiting  Outcome: Progressing  Goal: Maintains adequate nutritional intake  Outcome: Progressing     Problem: Hematologic - Adult  Goal: Maintains hematologic stability  Outcome: 
  Problem: Discharge Planning  Goal: Discharge to home or other facility with appropriate resources  Outcome: Progressing  Flowsheets  Taken 12/30/2023 0933 by Berto Moseley RN  Discharge to home or other facility with appropriate resources:   Identify discharge learning needs (meds, wound care, etc)   Identify barriers to discharge with patient and caregiver   Refer to discharge planning if patient needs post-hospital services based on physician order or complex needs related to functional status, cognitive ability or social support system   Arrange for needed discharge resources and transportation as appropriate  Taken 12/29/2023 2000 by Kwaku Pacheco RN  Discharge to home or other facility with appropriate resources:   Identify barriers to discharge with patient and caregiver   Refer to discharge planning if patient needs post-hospital services based on physician order or complex needs related to functional status, cognitive ability or social support system     Problem: Pain  Goal: Verbalizes/displays adequate comfort level or baseline comfort level  Outcome: Progressing  Flowsheets  Taken 12/30/2023 0933 by Berto Moseley RN  Verbalizes/displays adequate comfort level or baseline comfort level:   Encourage patient to monitor pain and request assistance   Assess pain using appropriate pain scale   Administer analgesics based on type and severity of pain and evaluate response   Implement non-pharmacological measures as appropriate and evaluate response  Taken 12/29/2023 2000 by Kwaku Pacheco RN  Verbalizes/displays adequate comfort level or baseline comfort level:   Encourage patient to monitor pain and request assistance   Assess pain using appropriate pain scale     Problem: ABCDS Injury Assessment  Goal: Absence of physical injury  Outcome: Progressing  Flowsheets  Taken 12/30/2023 0933 by Berto Moseley RN  Absence of Physical Injury: Implement safety measures based on patient assessment  Taken 
  Problem: Discharge Planning  Goal: Discharge to home or other facility with appropriate resources  Outcome: Progressing  Flowsheets (Taken 1/2/2024 0151)  Discharge to home or other facility with appropriate resources: Identify barriers to discharge with patient and caregiver     Problem: Pain  Goal: Verbalizes/displays adequate comfort level or baseline comfort level  Outcome: Progressing  Flowsheets (Taken 1/2/2024 0151)  Verbalizes/displays adequate comfort level or baseline comfort level:   Encourage patient to monitor pain and request assistance   Assess pain using appropriate pain scale   Implement non-pharmacological measures as appropriate and evaluate response   Administer analgesics based on type and severity of pain and evaluate response     Problem: Safety - Adult  Goal: Free from fall injury  Outcome: Progressing  Flowsheets (Taken 1/2/2024 0151)  Free From Fall Injury: Instruct family/caregiver on patient safety     Problem: Chronic Conditions and Co-morbidities  Goal: Patient's chronic conditions and co-morbidity symptoms are monitored and maintained or improved  Outcome: Progressing  Flowsheets (Taken 1/2/2024 0151)  Care Plan - Patient's Chronic Conditions and Co-Morbidity Symptoms are Monitored and Maintained or Improved:   Monitor and assess patient's chronic conditions and comorbid symptoms for stability, deterioration, or improvement   Collaborate with multidisciplinary team to address chronic and comorbid conditions and prevent exacerbation or deterioration   Update acute care plan with appropriate goals if chronic or comorbid symptoms are exacerbated and prevent overall improvement and discharge     Problem: Safety - Medical Restraint  Goal: Remains free of injury from restraints (Restraint for Interference with Medical Device)  Description: INTERVENTIONS:  1. Determine that other, less restrictive measures have been tried or would not be effective before applying the restraint  2. Evaluate 
  Problem: Discharge Planning  Goal: Discharge to home or other facility with appropriate resources  Outcome: Progressing  Flowsheets (Taken 12/11/2023 0109)  Discharge to home or other facility with appropriate resources:   Arrange for needed discharge resources and transportation as appropriate   Identify barriers to discharge with patient and caregiver   Identify discharge learning needs (meds, wound care, etc)       Problem: ABCDS Injury Assessment  Goal: Absence of physical injury  Outcome: Progressing  Flowsheets (Taken 12/11/2023 0109)  Absence of Physical Injury: Implement safety measures based on patient assessment     Problem: Respiratory - Adult  Goal: Achieves optimal ventilation and oxygenation  12/11/2023 0109 by Stacey Betancur, RN  Outcome: Progressing  Flowsheets (Taken 12/11/2023 0109)  Achieves optimal ventilation and oxygenation:   Assess for changes in respiratory status   Position to facilitate oxygenation and minimize respiratory effort   Respiratory therapy support as indicated   Assess for changes in mentation and behavior   Oxygen supplementation based on oxygen saturation or arterial blood gases   Assess and instruct to report shortness of breath or any respiratory difficulty     Problem: Chronic Conditions and Co-morbidities  Goal: Patient's chronic conditions and co-morbidity symptoms are monitored and maintained or improved  Outcome: Progressing  Flowsheets (Taken 12/11/2023 0109)  Care Plan - Patient's Chronic Conditions and Co-Morbidity Symptoms are Monitored and Maintained or Improved: Monitor and assess patient's chronic conditions and comorbid symptoms for stability, deterioration, or improvement     Problem: Genitourinary - Adult  Goal: Urinary catheter remains patent  Outcome: Progressing  Flowsheets (Taken 12/11/2023 0109)  Urinary catheter remains patent: Assess patency of urinary catheter     Problem: Infection - Adult  Goal: Absence of infection during 
  Problem: Discharge Planning  Goal: Discharge to home or other facility with appropriate resources  Outcome: Progressing  Flowsheets (Taken 12/12/2023 1759)  Discharge to home or other facility with appropriate resources:   Identify barriers to discharge with patient and caregiver   Identify discharge learning needs (meds, wound care, etc)   Arrange for needed discharge resources and transportation as appropriate   Refer to discharge planning if patient needs post-hospital services based on physician order or complex needs related to functional status, cognitive ability or social support system     Problem: Pain  Goal: Verbalizes/displays adequate comfort level or baseline comfort level  Outcome: Progressing  Flowsheets (Taken 12/12/2023 1759)  Verbalizes/displays adequate comfort level or baseline comfort level:   Assess pain using appropriate pain scale   Administer analgesics based on type and severity of pain and evaluate response   Implement non-pharmacological measures as appropriate and evaluate response   Notify Licensed Independent Practitioner if interventions unsuccessful or patient reports new pain     Problem: Respiratory - Adult  Goal: Achieves optimal ventilation and oxygenation  Outcome: Progressing  Flowsheets  Taken 12/12/2023 1759 by Yanira Sanderson RN  Achieves optimal ventilation and oxygenation:   Assess for changes in respiratory status   Position to facilitate oxygenation and minimize respiratory effort   Oxygen supplementation based on oxygen saturation or arterial blood gases   Assess the need for suctioning and aspirate as needed   Respiratory therapy support as indicated  Taken 12/12/2023 1025 by Christine Sanchez RCP  Achieves optimal ventilation and oxygenation:   Assess for changes in respiratory status   Position to facilitate oxygenation and minimize respiratory effort   Oxygen supplementation based on oxygen saturation or arterial blood gases   Assess the need for suctioning 
  Problem: Discharge Planning  Goal: Discharge to home or other facility with appropriate resources  Outcome: Progressing  Flowsheets (Taken 12/13/2023 2030 by Mario Holbrook, RN)  Discharge to home or other facility with appropriate resources:   Identify barriers to discharge with patient and caregiver   Arrange for needed discharge resources and transportation as appropriate   Identify discharge learning needs (meds, wound care, etc)   Refer to discharge planning if patient needs post-hospital services based on physician order or complex needs related to functional status, cognitive ability or social support system     Problem: Pain  Goal: Verbalizes/displays adequate comfort level or baseline comfort level  Outcome: Progressing  Flowsheets (Taken 12/13/2023 2030 by Mario Holbrook, RN)  Verbalizes/displays adequate comfort level or baseline comfort level:   Assess pain using appropriate pain scale   Administer analgesics based on type and severity of pain and evaluate response   Implement non-pharmacological measures as appropriate and evaluate response   Notify Licensed Independent Practitioner if interventions unsuccessful or patient reports new pain     Problem: ABCDS Injury Assessment  Goal: Absence of physical injury  Outcome: Progressing  Flowsheets (Taken 12/11/2023 0109 by Stacey Betancur, RN)  Absence of Physical Injury: Implement safety measures based on patient assessment     Problem: Safety - Adult  Goal: Free from fall injury  Outcome: Progressing  Flowsheets (Taken 12/9/2023 2242 by Bartolo Lancaster, RN)  Free From Fall Injury: Based on caregiver fall risk screen, instruct family/caregiver to ask for assistance with transferring infant if caregiver noted to have fall risk factors     Problem: Skin/Tissue Integrity  Goal: Absence of new skin breakdown  Description: 1.  Monitor for areas of redness and/or skin breakdown  2.  Assess vascular access sites hourly  3.  Every 4-6 hours minimum:  Change 
  Problem: Discharge Planning  Goal: Discharge to home or other facility with appropriate resources  Outcome: Progressing  Flowsheets (Taken 12/21/2023 0307)  Discharge to home or other facility with appropriate resources:   Identify barriers to discharge with patient and caregiver   Arrange for needed discharge resources and transportation as appropriate   Identify discharge learning needs (meds, wound care, etc)   Arrange for interpreters to assist at discharge as needed   Refer to discharge planning if patient needs post-hospital services based on physician order or complex needs related to functional status, cognitive ability or social support system     Problem: Pain  Goal: Verbalizes/displays adequate comfort level or baseline comfort level  Outcome: Progressing  Flowsheets (Taken 12/21/2023 0307)  Verbalizes/displays adequate comfort level or baseline comfort level:   Encourage patient to monitor pain and request assistance   Assess pain using appropriate pain scale   Administer analgesics based on type and severity of pain and evaluate response   Implement non-pharmacological measures as appropriate and evaluate response   Consider cultural and social influences on pain and pain management     Problem: ABCDS Injury Assessment  Goal: Absence of physical injury  Outcome: Progressing  Flowsheets (Taken 12/20/2023 0252 by Mario Holbrook, RN)  Absence of Physical Injury: Implement safety measures based on patient assessment     Problem: Safety - Adult  Goal: Free from fall injury  Outcome: Progressing  Flowsheets (Taken 12/21/2023 0307)  Free From Fall Injury:   Instruct family/caregiver on patient safety   Based on caregiver fall risk screen, instruct family/caregiver to ask for assistance with transferring infant if caregiver noted to have fall risk factors  Note: Without fall thus far.  Falling star in place.  Ling Fall Scale completed.      Problem: Skin/Tissue Integrity  Goal: Absence of new skin 
  Problem: Discharge Planning  Goal: Discharge to home or other facility with appropriate resources  Outcome: Progressing  Flowsheets (Taken 12/30/2023 1930 by Daniella France, RN)  Discharge to home or other facility with appropriate resources: Identify barriers to discharge with patient and caregiver  Note: Pt not appropriate for discharge at this time, will continue to monitor and reassess.        Problem: Pain  Goal: Verbalizes/displays adequate comfort level or baseline comfort level  Outcome: Progressing  Flowsheets (Taken 12/30/2023 1930 by Daniella France, RN)  Verbalizes/displays adequate comfort level or baseline comfort level:   Encourage patient to monitor pain and request assistance   Assess pain using appropriate pain scale   Administer analgesics based on type and severity of pain and evaluate response  Note: Pt has no complaints of pain at this time. Pain medication management provided. Will continue to monitor and reassess.        Problem: ABCDS Injury Assessment  Goal: Absence of physical injury  Outcome: Progressing  Flowsheets (Taken 12/30/2023 0933 by Berto Moseley, RN)  Absence of Physical Injury: Implement safety measures based on patient assessment     Problem: Safety - Adult  Goal: Free from fall injury  Outcome: Progressing  Flowsheets (Taken 12/30/2023 0933 by Berto Moseley, RN)  Free From Fall Injury:   Instruct family/caregiver on patient safety   Based on caregiver fall risk screen, instruct family/caregiver to ask for assistance with transferring infant if caregiver noted to have fall risk factors  Note: Pt safety education reinforced.       Problem: Skin/Tissue Integrity  Goal: Absence of new skin breakdown  Description: 1.  Monitor for areas of redness and/or skin breakdown  2.  Assess vascular access sites hourly  3.  Every 4-6 hours minimum:  Change oxygen saturation probe site  4.  Every 4-6 hours:  If on nasal continuous positive airway pressure, respiratory therapy 
  Problem: Discharge Planning  Goal: Discharge to home or other facility with appropriate resources  Outcome: Progressing  Flowsheets (Taken 12/8/2023 1503)  Discharge to home or other facility with appropriate resources: Identify barriers to discharge with patient and caregiver     Problem: Pain  Goal: Verbalizes/displays adequate comfort level or baseline comfort level  12/8/2023 2106 by Sunitha Palmer RN  Outcome: Progressing  Flowsheets (Taken 12/8/2023 2106)  Verbalizes/displays adequate comfort level or baseline comfort level: Encourage patient to monitor pain and request assistance  12/8/2023 1503 by Sunitha Palmer RN  Outcome: Progressing  Flowsheets (Taken 12/8/2023 1503)  Verbalizes/displays adequate comfort level or baseline comfort level: Encourage patient to monitor pain and request assistance     Problem: ABCDS Injury Assessment  Goal: Absence of physical injury  Outcome: Progressing  Flowsheets (Taken 12/8/2023 2106)  Absence of Physical Injury: Implement safety measures based on patient assessment     Problem: Safety - Adult  Goal: Free from fall injury  Outcome: Progressing  Flowsheets (Taken 12/8/2023 2106)  Free From Fall Injury: Instruct family/caregiver on patient safety     
  Problem: Discharge Planning  Goal: Discharge to home or other facility with appropriate resources  Outcome: Progressing  Flowsheets (Taken 12/8/2023 1503)  Discharge to home or other facility with appropriate resources: Identify barriers to discharge with patient and caregiver     Problem: Pain  Goal: Verbalizes/displays adequate comfort level or baseline comfort level  Outcome: Progressing  Flowsheets (Taken 12/8/2023 1503)  Verbalizes/displays adequate comfort level or baseline comfort level: Encourage patient to monitor pain and request assistance     
  Problem: Respiratory - Adult  Goal: Achieves optimal ventilation and oxygenation  12/10/2023 0551 by Argelia Mayberry, RCP  Outcome: Progressing   Remains on HFNC to support breathing and oxygenation. Will continue weaning as tolerated.     Patient mutually agreed on goals.    
  Problem: Respiratory - Adult  Goal: Achieves optimal ventilation and oxygenation  12/10/2023 0742 by Sara Blanc, RCP  Outcome: Progressing     
  Problem: Respiratory - Adult  Goal: Achieves optimal ventilation and oxygenation  12/13/2023 2043 by Shell Alonso RCP  Outcome: Progressing  Flowsheets  Taken 12/13/2023 2043 by Shell Alonso RCP  Achieves optimal ventilation and oxygenation:   Assess for changes in respiratory status   Position to facilitate oxygenation and minimize respiratory effort   Assess the need for suctioning and aspirate as needed   Respiratory therapy support as indicated   Assess for changes in mentation and behavior   Oxygen supplementation based on oxygen saturation or arterial blood gases   Encourage broncho-pulmonary hygiene including cough, deep breathe, incentive spirometry   Assess and instruct to report shortness of breath or any respiratory difficulty  Note: Intubated/Vented.Wean as tolerated. SBT when appropriate     
  Problem: Respiratory - Adult  Goal: Achieves optimal ventilation and oxygenation  12/14/2023 1309 by Marti Ye RCP  Outcome: Progressing    Note: Pt tolerated cpap for 5.5 hours than back to PCV.                                                  Patient Weaning Progress    The patient's vent settings was able to be weaned this shift.      Ventilator settings that were weaned              [x] Mode   [x] Pressure support weaned   [] Fio2 weaned   [] Peep weaned      Spontaneous weaning trial  was attempted.     *Results of SBT documented under SBTOUTCOME note.    Reason that defined ventilator parameters for SBT was not met              [] Patient condition requires increased ventilator settings  [] Requires increased sedation   [] Settings not within weaning range   [] SAT not completed   [] Physician orders    The patient was able to tolerate SBT.        Evac tube was  hooked up with continuous low suction(20-30mmHg)      Cuff was not  deflated to determine cuff leak.   Patient mutually agreed on goals.    Family mutually agreed on goals.    12/14/2023 1024 by Keny Cabrera RN  Outcome: Progressing  Flowsheets (Taken 12/13/2023 2043 by Shell Alonso RCP)  Achieves optimal ventilation and oxygenation:   Assess for changes in respiratory status   Position to facilitate oxygenation and minimize respiratory effort   Assess the need for suctioning and aspirate as needed   Respiratory therapy support as indicated   Assess for changes in mentation and behavior   Oxygen supplementation based on oxygen saturation or arterial blood gases   Encourage broncho-pulmonary hygiene including cough, deep breathe, incentive spirometry   Assess and instruct to report shortness of breath or any respiratory difficulty     
  Problem: Respiratory - Adult  Goal: Achieves optimal ventilation and oxygenation  12/17/2023 0734 by Rohan Mauricio RCP  Outcome: Progressing                                                Patient Weaning Progress    The patient's vent settings was not able to be weaned this shift.      Ventilator settings that were weaned              [] Mode   [x] Pressure support weaned   [x] Fio2 weaned   [x] Peep weaned      Spontaneous weaning trial  was not attempted.     due to defined parameters for SBT (spontaneous breathing trial) not being met.     *Results of SBT documented under SBTOUTCOME note.    Reason that defined ventilator parameters for SBT was not met             [] Patient condition requires increased ventilator settings  [] Requires increased sedation   [] Settings not within weaning range   [] SAT not completed   [] Physician orders    Cuff was not deflated to determine cuff leak.    Unable to get agreement for goals because no family is present and patient cannot respond.      
  Problem: Respiratory - Adult  Goal: Achieves optimal ventilation and oxygenation  12/21/2023 0534 by Argelia Mayberry, NICOLETTE  Outcome: Progressing                                                Patient Weaning Progress    The patient's vent settings was able to be weaned this shift.      Ventilator settings that were weaned              [x] Mode   [] Pressure support weaned   [] Fio2 weaned   [] Peep weaned    Spontaneous weaning trial  was attempted.     The patient was able to tolerate SBT.   RSBI  was 59.38 with EtCO2 of 25 and SpO2 of 96 on 30% FiO2.  Spontanteous VT was 421 and RR 25 breaths/min.  Pt tolerating SBT well.   Evac tube was  hooked up with continuous low suction(20-30mmHg)      Cuff was not  deflated to determine cuff leak. A leak  was not detected.     Unable to get agreement for goals because no family is present and patient cannot respond.     
  Problem: Respiratory - Adult  Goal: Achieves optimal ventilation and oxygenation  12/30/2023 0135 by Kwaku Pacheco, RN  Outcome: Progressing  Flowsheets (Taken 12/29/2023 0752 by Christine Sanchez RCP)  Achieves optimal ventilation and oxygenation:   Assess for changes in respiratory status   Respiratory therapy support as indicated  Note: Continues to maintain O2 saturation >90 on 4L O2  12/29/2023 1217 by Elizabeth Bennett  Outcome: Progressing  Flowsheets (Taken 12/29/2023 0752 by Christine Sanchez, JOHN)  Achieves optimal ventilation and oxygenation:   Assess for changes in respiratory status   Respiratory therapy support as indicated     Problem: Genitourinary - Adult  Goal: Urinary catheter remains patent  12/30/2023 0136 by Kwaku Pacheco RN  Outcome: Progressing  Flowsheets (Taken 12/28/2023 1900 by Daniella France RN)  Urinary catheter remains patent: Assess patency of urinary catheter  Note: Continues to have adequate urinary output with catheter in place.  12/29/2023 1217 by Elizabeth Bennett  Outcome: Progressing    Care plan reviewed with Neftali.  Neftali verbalizes understanding of the plan of care and contribute to goal setting.        
  Problem: Respiratory - Adult  Goal: Achieves optimal ventilation and oxygenation  12/30/2023 1648 by Dora Connelly RCP  Outcome: Progressing     Problem: Respiratory - Adult  Goal: Clear lung sounds  12/30/2023 1648 by Dora Connelly RCP  Outcome: Progressing   Patient is receiving tiotropium-olodaterol and sodium chloride 3% to promote and preserve a clear, open airway. Patient breath sounds improved post-treatment and will continue to improve with further therapy. Patient mutually agreed on goals.   
  Problem: Respiratory - Adult  Goal: Achieves optimal ventilation and oxygenation  12/9/2023 0540 by Argelia Mayberry, RCP  Outcome: Progressing   Remains on HFNC to support breathing anf oxygenation. Will continue weaning as tolerated.    Patient mutually agreed on goals.    
  Problem: Respiratory - Adult  Goal: Achieves optimal ventilation and oxygenation  12/9/2023 0845 by Sara Blanc, RCP  Outcome: Progressing     
  Problem: Respiratory - Adult  Goal: Achieves optimal ventilation and oxygenation  Outcome: Progressing                                                Patient Weaning Progress    The patient's vent settings was able to be weaned this shift.      Ventilator settings that were weaned              [] Mode   [] Pressure support weaned   [] Fio2 weaned   [x] Peep weaned      Spontaneous weaning trial  was not attempted.     due to defined parameters for SBT (spontaneous breathing trial) not being met.     *Results of SBT documented under SBTOUTCOME note.    Reason that defined ventilator parameters for SBT was not met              [] Patient condition requires increased ventilator settings  [] Requires increased sedation   [x] Settings not within weaning range   [] SAT not completed   [] Physician orders      Evac tube was  hooked up with continuous low suction(20-30mmHg)      Cuff was not  deflated to determine cuff leak.     Unable to get agreement for goals because no family is present and patient cannot respond.     
  Problem: Respiratory - Adult  Goal: Achieves optimal ventilation and oxygenation  Outcome: Progressing   Patient on heated high flow at this time and tolerating well. Wean as tolerated to maintain SpO2 greater than 90%. Patient on scheduled breathing treatments to improve lung aeration   
  Problem: Respiratory - Adult  Goal: Achieves optimal ventilation and oxygenation  Outcome: Progressing   Remains on HFNC to support breathing and oxygenation. Receiving nebulizer with metaneb  to improve aeration, provide lung expansion, and bronchial hygiene along with mucomyst. Will continue with therapies as ordered.   Patient mutually agreed on goals.    
  Problem: Respiratory - Adult  Goal: Achieves optimal ventilation and oxygenation  Outcome: Progressing  Flowsheets  Taken 12/13/2023 2043 by Shell Alonso RCP  Achieves optimal ventilation and oxygenation:   Assess for changes in respiratory status   Position to facilitate oxygenation and minimize respiratory effort   Assess the need for suctioning and aspirate as needed   Respiratory therapy support as indicated   Assess for changes in mentation and behavior   Oxygen supplementation based on oxygen saturation or arterial blood gases   Encourage broncho-pulmonary hygiene including cough, deep breathe, incentive spirometry   Assess and instruct to report shortness of breath or any respiratory difficulty  Note: Intubated/Vented.Wean as tolerated. SBT when appropriate     
  Problem: Respiratory - Adult  Goal: Achieves optimal ventilation and oxygenation  Outcome: Progressing  Flowsheets (Taken 12/19/2023 0551)  Achieves optimal ventilation and oxygenation:   Assess for changes in respiratory status   Position to facilitate oxygenation and minimize respiratory effort   Assess the need for suctioning and aspirate as needed   Respiratory therapy support as indicated   Assess for changes in mentation and behavior   Oxygen supplementation based on oxygen saturation or arterial blood gases   Encourage broncho-pulmonary hygiene including cough, deep breathe, incentive spirometry   Assess and instruct to report shortness of breath or any respiratory difficulty  Note: Intubated/Vented. Wean as tolerated. SBT when appropriate.                                                  Patient Weaning Progress    The patient's vent settings was able to be weaned this shift.      Ventilator settings that were weaned              [x] Mode   [] Pressure support weaned   [] Fio2 weaned   [] Peep weaned      Spontaneous weaning trial  was attempted.     due to defined parameters for SBT (spontaneous breathing trial) not being met.     *Results of SBT documented under SBTOUTCOME note.      The patient was able to tolerate SBT.   RSBI  was 35 with EtCO2 of 26 and SpO2 of 97 on 30% FiO2.  Spontanteous VT was 508 and RR 18 breaths/min.      Evac tube was  hooked up with continuous low suction(20-30mmHg)      Cuff was not  deflated to determine cuff leak.    Unable to get agreement for goals because no family is present and patient cannot respond.        
  Problem: Respiratory - Adult  Goal: Achieves optimal ventilation and oxygenation  Outcome: Progressing  Flowsheets (Taken 12/24/2023 1930 by Perlita Nicholson RN)  Achieves optimal ventilation and oxygenation:   Assess for changes in respiratory status   Assess for changes in mentation and behavior   Oxygen supplementation based on oxygen saturation or arterial blood gases   Respiratory therapy support as indicated   Continue inhaled medications to improve breath sounds.  Pt agrees with plan of care  
  Problem: Respiratory - Adult  Goal: Clear lung sounds  Outcome: Progressing    Patient lung sounds are considered normal for their current lung condition. No signs of distress noted. Current treatment regimen appropriate      Patient mutually agreed on goals.      
  Problem: Respiratory - Adult  Goal: Clear lung sounds  Outcome: Progressing  Note: Txs to help improve lung aeration. Patient mutually agreed on goals.       
  Problem: Respiratory - Adult  Goal: Clear lung sounds  Outcome: Progressing  Note: Txs to help improve lung aeration. Patient mutually agreed on goals.       
  Problem: Respiratory - Adult Patient Weaning Progress    The patient's vent settings was not able to be weaned this shift.      Ventilator settings that were weaned              [] Mode   [] Pressure support weaned   [] Fio2 weaned   [] Peep weaned      Spontaneous weaning trial  was not attempted.     due to defined parameters for SBT (spontaneous breathing trial) not being met.     *Results of SBT documented under SBTOUTCOME note.    Reason that defined ventilator parameters for SBT was not met              [] Patient condition requires increased ventilator settings  [] Requires increased sedation   [] Settings not within weaning range   [] SAT not completed   [] Physician orders      Evac tube was  hooked up with continuous low suction(20-30mmHg)      Cuff was not  deflated to determine cuff leak.   Unable to get agreement for goals because no family is present and patient cannot respond.     Goal: Achieves optimal ventilation and oxygenation  12/15/2023 1031 by Ana Morgan, NICOLETTE  Outcome: Progressing   Vent setting optimized to achieve target tidal volume, respiratory rate and ideal oxygen saturations. SBT will be performed when appropriate.    Pt continues on ventilator.  Early mobility by PT/OT.  Pt does not wake to follow commands.  Sx'd tan mucous- orally and via ett        
  Problem: Safety - Medical Restraint  Goal: Remains free of injury from restraints (Restraint for Interference with Medical Device)  Description: INTERVENTIONS:  1. Determine that other, less restrictive measures have been tried or would not be effective before applying the restraint  2. Evaluate the patient's condition at the time of restraint application  3. Inform patient/family regarding the reason for restraint  4. Q2H: Monitor safety, psychosocial status, comfort, nutrition and hydration  Outcome: Progressing  Flowsheets (Taken 12/13/2023 2000)  Remains free of injury from restraints (restraint for interference with medical device):   Determine that other, less restrictive measures have been tried or would not be effective before applying the restraint   Evaluate the patient's condition at the time of restraint application   Inform patient/family regarding the reason for restraint   Every 2 hours: Monitor safety, psychosocial status, comfort, nutrition and hydration   Care plan reviewed with patient.  Patient is unable to verbalize understanding of the plan of care and contribute to goal setting due to being intubated and sedated.     
  Problem: Safety - Medical Restraint  Goal: Remains free of injury from restraints (Restraint for Interference with Medical Device)  Description: INTERVENTIONS:  1. Determine that other, less restrictive measures have been tried or would not be effective before applying the restraint  2. Evaluate the patient's condition at the time of restraint application  3. Inform patient/family regarding the reason for restraint  4. Q2H: Monitor safety, psychosocial status, comfort, nutrition and hydration  Outcome: Progressing  Flowsheets (Taken 12/15/2023 0000)  Remains free of injury from restraints (restraint for interference with medical device):   Determine that other, less restrictive measures have been tried or would not be effective before applying the restraint   Evaluate the patient's condition at the time of restraint application   Inform patient/family regarding the reason for restraint   Every 2 hours: Monitor safety, psychosocial status, comfort, nutrition and hydration     
  Problem: Safety - Medical Restraint  Goal: Remains free of injury from restraints (Restraint for Interference with Medical Device)  Description: INTERVENTIONS:  1. Determine that other, less restrictive measures have been tried or would not be effective before applying the restraint  2. Evaluate the patient's condition at the time of restraint application  3. Inform patient/family regarding the reason for restraint  4. Q2H: Monitor safety, psychosocial status, comfort, nutrition and hydration  Outcome: Progressing  Flowsheets (Taken 12/19/2023 1802)  Remains free of injury from restraints (restraint for interference with medical device):   Every 2 hours: Monitor safety, psychosocial status, comfort, nutrition and hydration   Evaluate the patient's condition at the time of restraint application   Determine that other, less restrictive measures have been tried or would not be effective before applying the restraint   Inform patient/family regarding the reason for restraint   Care plan reviewed with patient.  Patient unable to verbalize understanding of the plan of care and contributed to goal setting.      
  Problem: Safety - Medical Restraint  Goal: Remains free of injury from restraints (Restraint for Interference with Medical Device)  Description: INTERVENTIONS:  1. Determine that other, less restrictive measures have been tried or would not be effective before applying the restraint  2. Evaluate the patient's condition at the time of restraint application  3. Inform patient/family regarding the reason for restraint  4. Q2H: Monitor safety, psychosocial status, comfort, nutrition and hydration  Outcome: Progressing  Flowsheets (Taken 12/20/2023 5621)  Remains free of injury from restraints (restraint for interference with medical device):   Determine that other, less restrictive measures have been tried or would not be effective before applying the restraint   Every 2 hours: Monitor safety, psychosocial status, comfort, nutrition and hydration     
  Problem: Safety - Medical Restraint  Goal: Remains free of injury from restraints (Restraint for Interference with Medical Device)  Description: INTERVENTIONS:  1. Determine that other, less restrictive measures have been tried or would not be effective before applying the restraint  2. Evaluate the patient's condition at the time of restraint application  3. Inform patient/family regarding the reason for restraint  4. Q2H: Monitor safety, psychosocial status, comfort, nutrition and hydration  Recent Flowsheet Documentation  Taken 12/15/2023 2000 by Kira Valentin RN  Remains free of injury from restraints (restraint for interference with medical device):   Determine that other, less restrictive measures have been tried or would not be effective before applying the restraint   Evaluate the patient's condition at the time of restraint application   Inform patient/family regarding the reason for restraint   Every 2 hours: Monitor safety, psychosocial status, comfort, nutrition and hydration     
  Problem: Skin/Tissue Integrity  Goal: Absence of new skin breakdown  Description: 1.  Monitor for areas of redness and/or skin breakdown  2.  Assess vascular access sites hourly  3.  Every 4-6 hours minimum:  Change oxygen saturation probe site  4.  Every 4-6 hours:  If on nasal continuous positive airway pressure, respiratory therapy assess nares and determine need for appliance change or resting period.  Outcome: Not Progressing     Problem: Infection - Adult  Goal: Absence of infection during hospitalization  Outcome: Not Progressing     Problem: Musculoskeletal - Adult  Goal: Return mobility to safest level of function  Outcome: Not Progressing     Problem: Discharge Planning  Goal: Discharge to home or other facility with appropriate resources  Outcome: Progressing     Problem: Pain  Goal: Verbalizes/displays adequate comfort level or baseline comfort level  Outcome: Progressing  Flowsheets (Taken 12/22/2023 2000)  Verbalizes/displays adequate comfort level or baseline comfort level:   Encourage patient to monitor pain and request assistance   Assess pain using appropriate pain scale     Problem: ABCDS Injury Assessment  Goal: Absence of physical injury  Outcome: Progressing     Problem: Safety - Adult  Goal: Free from fall injury  Outcome: Progressing     Problem: Respiratory - Adult  Goal: Achieves optimal ventilation and oxygenation  12/22/2023 2215 by Perlita Nicholson, RN  Outcome: Progressing  12/22/2023 0953 by Idalia Mchugh RCP  Outcome: Progressing  12/22/2023 0952 by Idalia Mchugh RCP  Outcome: Progressing  Flowsheets (Taken 12/21/2023 2000 by Jn Hurt, RN)  Achieves optimal ventilation and oxygenation:   Assess for changes in respiratory status   Assess for changes in mentation and behavior   Position to facilitate oxygenation and minimize respiratory effort   Oxygen supplementation based on oxygen saturation or arterial blood gases   Initiate smoking cessation protocol as indicated   
Breath sounds within normal limits    
Breath sounds within normal limits  Continue HFNC for WOB and breathing tx for breathing    
Notified of bedside concern for increase in size of hematoma since index evaluation this morning    -patient more compliant in exam compared to this morning, compartments remain soft and compressible, laterally there is continued induration but incision remains healthy without erythema fluctuance or concern for underlying infection, no ballotable areas or areas of fluctuance     Stable exam, compartments remain soft    -no plan for surgical intervention on hematoma considering patient is on a heparin drip. Rather, advanced imaging could aid in an anticoagulation plan for critical care    Ortho will continue to follow  
Plan of care    Patient has pmh of end stage COPD, pulmonary fibrosis, chronic respiratory failure. Transferred from [facility] on 12/8/23 due to acute hypoxic respiratory failure. Of note, underwent abx spacer on 11/27/23 for mgmt of PJI of hip. Postoperatively, patient noted to be hypoxic, placed on HFNC and transferred to [facility] for higher level of care. RRT called on 12/12/23 for worsening respiratory status, patient placed on NRB/100% FiO2/60L via HFNC without improvement. Ultimately intubated and transferred to the [facility] [place] [facility]. Hospital course has been protracted and complicated, dealing with a multitude of concerns and problems    Acute on chronic hypoxic respiratory failure requiring mechanical ventilation from 12/8 - 12/21  End stage COPD with emphysematous fibrosis  Aspiration pneumonia - E coli, PSA  Currently on 4L, though satting well  Needs aggressive pulmonary toileting, wean with goal O2 89%  Continue stiolto  S/p CTX from 12/1- - 12/16 for e coli from BAL  S/p zosyn/cefepime from 12/17 - 12/24 for PSA on trach aspirate     RAMANDEEP on CKD III  Cr 3.3, peak Cr 3.6  Nephrology consulted, appreciate assistance  Electrolytes stable.     HFrEF (LVEF 45%)  Does not appear to be in acute decompensation  No GDMT onboard  Beta bloacker deferred d/t wheezing  ACEi/ARB, MRA, ARNI deferred d/t renal function     History of right hip PJI s/p intervention  Right gluteal/hip hematoma  No abx  Ortho following  Monitor hematoma progression  Continue ASA, heparin gtt     Dysphagia  delirium  Unable to tolerate PO without concern for aspiration.   SLP consulted, recommending NPO for now. Plan for MBS  If fails, will need to discuss G tube, goals of care with POA    Bilateral DVT  Acute blood loss anemia  Presence of BLE DVT. On heparin gtt  Has right hip hematoma postoperatively  Hb of 6.9. recheck. Transfuse as needed     HTN  C/w norvasc, clonidine    Afib RVR  Rate controlled off beta blocker.  Monitor.   On heparin gtt 
Problem: Respiratory - Adult  Goal: Achieves optimal ventilation and oxygenation  12/18/2023 0542 by Rohan Mauricio RCP  Outcome: Progressing                                               Patient Weaning Progress    The patient's vent settings was able to be weaned this shift.    Ventilator settings that were weaned             [] Mode   [x] Pressure support weaned   [] Fio2 weaned   [] Peep weaned    Spontaneous weaning trial was attempted.    *Results of SBT documented under SBTOUTCOME note.    Evac tube was hooked up with continuous low suction(20-30mmHg)     Cuff was not  deflated to determine cuff leak.    Unable to get agreement for goals because no family is present and patient cannot respond.   
The patient was found to have a Potassium of 5.9 this morning.    A stat EKG was ordered, and the patient was given IV insulin 10 units with D50 bolus. A repeat potassium check has also been scheduled for 10 AM.    Additional management will be left to discretion of day team.  Cruz Valera DO, PGY-3  
Progressing     Problem: Nutrition Deficit:  Goal: Optimize nutritional status  Outcome: Progressing     Problem: Chronic Conditions and Co-morbidities  Goal: Patient's chronic conditions and co-morbidity symptoms are monitored and maintained or improved  Outcome: Progressing     Problem: Coping  Goal: Pt/Family able to verbalize concerns and demonstrate effective coping strategies  Description: INTERVENTIONS:  1. Assist patient/family to identify coping skills, available support systems and cultural and spiritual values  2. Provide emotional support, including active listening and acknowledgement of concerns of patient and caregivers  3. Reduce environmental stimuli, as able  4. Instruct patient/family in relaxation techniques, as appropriate  5. Assess for spiritual pain/suffering and initiate Spiritual Care, Psychosocial Clinical Specialist consults as needed  Outcome: Progressing     Care plan reviewed with patient.  Patient verbalizes understanding of the plan of care and contributes to goal setting.       
on patient safety     Problem: Skin/Tissue Integrity  Goal: Absence of new skin breakdown  Description: 1.  Monitor for areas of redness and/or skin breakdown  2.  Assess vascular access sites hourly  3.  Every 4-6 hours minimum:  Change oxygen saturation probe site  4.  Every 4-6 hours:  If on nasal continuous positive airway pressure, respiratory therapy assess nares and determine need for appliance change or resting period.  12/17/2023 2020 by Miguel Angel Darling RN  Outcome: Progressing  Note: No new skin breakdown noted this shift. Patient turned and repositioned every 2 hours and PRN.        Problem: Respiratory - Adult  Goal: Achieves optimal ventilation and oxygenation  12/17/2023 2020 by Miguel Angel Darling RN  Outcome: Progressing  Flowsheets (Taken 12/17/2023 2020)  Achieves optimal ventilation and oxygenation:   Assess for changes in respiratory status   Position to facilitate oxygenation and minimize respiratory effort   Assess the need for suctioning and aspirate as needed   Respiratory therapy support as indicated   Assess for changes in mentation and behavior   Assess and instruct to report shortness of breath or any respiratory difficulty     Problem: Nutrition Deficit:  Goal: Optimize nutritional status  12/17/2023 2020 by Miguel Angel Darling RN  Outcome: Progressing  Flowsheets (Taken 12/17/2023 2020)  Nutrient intake appropriate for improving, restoring, or maintaining nutritional needs:   Assess nutritional status and recommend course of action   Recommend, monitor, and adjust tube feedings and TPN/PPN based on assessed needs   Monitor oral intake, labs, and treatment plans     Problem: Chronic Conditions and Co-morbidities  Goal: Patient's chronic conditions and co-morbidity symptoms are monitored and maintained or improved  12/17/2023 2020 by Miguel Angel Darling RN  Outcome: Progressing  Flowsheets (Taken 12/17/2023 2020)  Care Plan - Patient's Chronic Conditions and Co-Morbidity Symptoms are Monitored and 
pressure, respiratory therapy assess nares and determine need for appliance change or resting period.  Outcome: Progressing  Note: Pt has had no new skin breakdown this shift. Pt is assisted with turning every two hours while in bed and is educated on the importance of relieving pressure to prevent skin breakdown.        Problem: Respiratory - Adult  Goal: Achieves optimal ventilation and oxygenation  Outcome: Progressing  Flowsheets (Taken 12/19/2023 2030)  Achieves optimal ventilation and oxygenation:   Assess for changes in respiratory status   Assess for changes in mentation and behavior   Position to facilitate oxygenation and minimize respiratory effort   Oxygen supplementation based on oxygen saturation or arterial blood gases   Assess the need for suctioning and aspirate as needed   Respiratory therapy support as indicated     Problem: Nutrition Deficit:  Goal: Optimize nutritional status  Outcome: Progressing  Flowsheets (Taken 12/20/2023 0252)  Nutrient intake appropriate for improving, restoring, or maintaining nutritional needs:   Assess nutritional status and recommend course of action   Monitor oral intake, labs, and treatment plans   Recommend appropriate diets, oral nutritional supplements, and vitamin/mineral supplements     Problem: Chronic Conditions and Co-morbidities  Goal: Patient's chronic conditions and co-morbidity symptoms are monitored and maintained or improved  Outcome: Progressing  Flowsheets (Taken 12/19/2023 2030)  Care Plan - Patient's Chronic Conditions and Co-Morbidity Symptoms are Monitored and Maintained or Improved:   Monitor and assess patient's chronic conditions and comorbid symptoms for stability, deterioration, or improvement   Collaborate with multidisciplinary team to address chronic and comorbid conditions and prevent exacerbation or deterioration   Update acute care plan with appropriate goals if chronic or comorbid symptoms are exacerbated and prevent overall 
Chronic Conditions and Co-Morbidity Symptoms are Monitored and Maintained or Improved:   Monitor and assess patient's chronic conditions and comorbid symptoms for stability, deterioration, or improvement   Collaborate with multidisciplinary team to address chronic and comorbid conditions and prevent exacerbation or deterioration   Update acute care plan with appropriate goals if chronic or comorbid symptoms are exacerbated and prevent overall improvement and discharge     Problem: Safety - Medical Restraint  Goal: Remains free of injury from restraints (Restraint for Interference with Medical Device)  Description: INTERVENTIONS:  1. Determine that other, less restrictive measures have been tried or would not be effective before applying the restraint  2. Evaluate the patient's condition at the time of restraint application  3. Inform patient/family regarding the reason for restraint  4. Q2H: Monitor safety, psychosocial status, comfort, nutrition and hydration  12/14/2023 1024 by eKny Cabrera, RN  Outcome: Progressing  Flowsheets (Taken 12/13/2023 2000 by Mario Holbrook, RN)  Remains free of injury from restraints (restraint for interference with medical device):   Determine that other, less restrictive measures have been tried or would not be effective before applying the restraint   Evaluate the patient's condition at the time of restraint application   Inform patient/family regarding the reason for restraint   Every 2 hours: Monitor safety, psychosocial status, comfort, nutrition and hydration    Care plan reviewed with patient and family.  Patient and family verbalize understanding of the plan of care and contribute to goal setting.        
supplementation based on oxygen saturation or arterial blood gases   Assess and instruct to report shortness of breath or any respiratory difficulty  Note: Maintain open Airway  Ensure proper oxygenation  Maintain oxygenation within normal parameters  Cough and deep breathe promotion.  Encourage turning to improve airway clearance     Problem: Genitourinary - Adult  Goal: Urinary catheter remains patent  Outcome: Progressing  Flowsheets (Taken 12/11/2023 2353)  Urinary catheter remains patent: Assess patency of urinary catheter     Problem: Infection - Adult  Goal: Absence of infection during hospitalization  Outcome: Progressing  Flowsheets (Taken 12/11/2023 2353)  Absence of infection during hospitalization:   Assess and monitor for signs and symptoms of infection   Monitor lab/diagnostic results   Monitor all insertion sites i.e., indwelling lines, tubes and drains   Administer medications as ordered   Instruct and encourage patient and family to use good hand hygiene technique   Identify and instruct in appropriate isolation precautions for identified infection/condition  Note: Encourage washing hands.   Encourage non-contact with infected area.     Problem: Nutrition Deficit:  Goal: Optimize nutritional status  Outcome: Progressing  Flowsheets (Taken 12/11/2023 2353)  Nutrient intake appropriate for improving, restoring, or maintaining nutritional needs:   Assess nutritional status and recommend course of action   Monitor oral intake, labs, and treatment plans   Recommend appropriate diets, oral nutritional supplements, and vitamin/mineral supplements   Order, calculate, and assess calorie counts as needed   Provide specific nutrition education to patient or family as appropriate  Note: Encourage intake during meal times.   Encourage healthy choices.     Problem: Chronic Conditions and Co-morbidities  Goal: Patient's chronic conditions and co-morbidity symptoms are monitored and maintained or improved  Outcome: 
reviewed with patient and family.  Patient and family verbalize understanding of the plan of care and contribute to goal setting.     
use of appropriate assistive device and precautions (e.g. spinal or hip dislocation precautions)     Problem: Gastrointestinal - Adult  Goal: Minimal or absence of nausea and vomiting  Outcome: Progressing  Flowsheets (Taken 12/24/2023 1236)  Minimal or absence of nausea and vomiting:   Administer IV fluids as ordered to ensure adequate hydration   Provide nonpharmacologic comfort measures as appropriate   Nutrition consult to assist patient with adequate nutrition and appropriate food choices   Maintain NPO status until nausea and vomiting are resolved   Administer ordered antiemetic medications as needed   Advance diet as tolerated, if ordered  Goal: Maintains adequate nutritional intake  Outcome: Progressing  Flowsheets (Taken 12/24/2023 1236)  Maintains adequate nutritional intake:   Monitor percentage of each meal consumed   Monitor intake and output, weight and lab values   Assist with meals as needed   Obtain nutritional consult as needed   Identify factors contributing to decreased intake, treat as appropriate     Problem: Hematologic - Adult  Goal: Maintains hematologic stability  Outcome: Progressing  Flowsheets (Taken 12/24/2023 1236)  Maintains hematologic stability:   Assess for signs and symptoms of bleeding or hemorrhage   Administer blood products/factors as ordered   Monitor labs for bleeding or clotting disorders     Care plan reviewed with patient and family.  Patient and family verbalize understanding of the plan of care and contribute to goal setting.    Electronically signed by Yola Townsend RN on 12/24/2023 at 12:42 PM    
body alignment per provider's orders  12/23/2023 1658 by Kelsey Perez RN  Outcome: Progressing  Flowsheets (Taken 12/23/2023 1658)  Maintain proper alignment of affected body part:   Support and protect limb and body alignment per provider's orders   Instruct and reinforce with patient and family use of appropriate assistive device and precautions (e.g. spinal or hip dislocation precautions)     Problem: Gastrointestinal - Adult  Goal: Minimal or absence of nausea and vomiting  12/23/2023 2142 by Perlita Nicholson RN  Outcome: Progressing  Flowsheets (Taken 12/23/2023 1930)  Minimal or absence of nausea and vomiting:   Maintain NPO status until nausea and vomiting are resolved   Advance diet as tolerated, if ordered   Nutrition consult to assist patient with adequate nutrition and appropriate food choices  12/23/2023 1658 by Kelsey Perez RN  Outcome: Progressing  Flowsheets (Taken 12/23/2023 1658)  Minimal or absence of nausea and vomiting:   Administer IV fluids as ordered to ensure adequate hydration   Maintain NPO status until nausea and vomiting are resolved   Administer ordered antiemetic medications as needed   Provide nonpharmacologic comfort measures as appropriate  Goal: Maintains adequate nutritional intake  Outcome: Progressing     Problem: Hematologic - Adult  Goal: Maintains hematologic stability  12/23/2023 2142 by Perlita Nicholson RN  Outcome: Progressing  12/23/2023 1658 by Kelsey Perez RN  Outcome: Progressing  Flowsheets (Taken 12/23/2023 1658)  Maintains hematologic stability:   Assess for signs and symptoms of bleeding or hemorrhage   Monitor labs for bleeding or clotting disorders   Administer blood products/factors as ordered     Care plan reviewed with patient.  Patient verbalizes understanding of the plan of care and contributes to goal setting.   
  Problem: Coping  Goal: Pt/Family able to verbalize concerns and demonstrate effective coping strategies  Description: INTERVENTIONS:  1. Assist patient/family to identify coping skills, available support systems and cultural and spiritual values  2. Provide emotional support, including active listening and acknowledgement of concerns of patient and caregivers  3. Reduce environmental stimuli, as able  4. Instruct patient/family in relaxation techniques, as appropriate  5. Assess for spiritual pain/suffering and initiate Spiritual Care, Psychosocial Clinical Specialist consults as needed  12/26/2023 2111 by Jennie Mason, RN  Outcome: Progressing  Flowsheets  Taken 12/26/2023 2111 by Jennie Mason, RN  Patient/family able to verbalize anxieties, fears, and concerns, and demonstrate effective coping: Assist patient/family to identify coping skills, available support systems and cultural and spiritual values  Taken 12/26/2023 1433 by James Moreland RN  Patient/family able to verbalize anxieties, fears, and concerns, and demonstrate effective coping:   Assist patient/family to identify coping skills, available support systems and cultural and spiritual values   Provide emotional support, including active listening and acknowledgement of concerns of patient and caregivers   Reduce environmental stimuli, as able   Instruct patient/family in relaxation techniques, as appropriate  12/26/2023 1433 by James Moreland RN  Outcome: Progressing  Flowsheets (Taken 12/26/2023 1433)  Patient/family able to verbalize anxieties, fears, and concerns, and demonstrate effective coping:   Assist patient/family to identify coping skills, available support systems and cultural and spiritual values   Provide emotional support, including active listening and acknowledgement of concerns of patient and caregivers   Reduce environmental stimuli, as able   Instruct patient/family in relaxation techniques, as appropriate

## 2024-01-02 NOTE — H&P
[facility] Admission Note        Patient:   Neftali Hazel  YOB: 1935  Age:  88 y.o.  Room:  [address] [place] [address]  MRN:  [id number]   Acct: [id number]  PCP: Brenda Prajapati MD    Date of Admission:  1/2/2024 11:57 AM  Date of Service:  1/2/2024    Subjective   Chief Complaint: Acute hypoxemic respiratory failure (HCC)     History Obtained From:- Family member - Son, Electronic Medical Record, Reason patient could not give history: Altered mental status    History of Present Illness: Neftali Hazel is a 88 y.o. 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 They are admitted to [facility]ice at [facility] with a hospice terminal diagnosis of Acute hypoxic respiratory failure. They are presenting for their first benefit period. The patient is minimally responsive. There was family present at the bedside. Patient initially presented to [facility] as a transfer from [facility] on December 8, 2023.  He had a antibiotic spacer placed after prosthetic joint fraction.  He presented to [facility] for revision of his arthroplasty on November 27, 2023.  He experienced postoperative hypoxia necessitating taking high flow nasal cannula.  Outside hospital had difficulty weaning off of this.  The

## 2024-01-02 NOTE — CARE COORDINATION
CM Note  GIP status; Hospice following, await their recommendations  Electronically signed by Hernan Childers RN on 1/2/2024 at 12:20 PM

## 2024-01-02 NOTE — DISCHARGE SUMMARY
Result Value Ref Range    Est, Glom Filt Rate 15 (A) >60 ml/min/1.73m2   Scan of Blood Smear    Collection Time: 01/01/24  5:40 AM   Result Value Ref Range    SCAN OF BLOOD SMEAR see below    TYPE AND SCREEN    Collection Time: 01/01/24  7:19 AM   Result Value Ref Range    ABO B     Rh Factor POS     Antibody Screen NEG    POCT Glucose    Collection Time: 01/01/24  8:47 AM   Result Value Ref Range    POC Glucose 135 (H) 70 - 108 mg/dl        Microbiology:    Blood culture #1:   Lab Results   Component Value Date/Time    BC  12/17/2023 04:46 PM     No growth 24 hours. No growth 48 hours. No growth at 5 days    BC  12/17/2023 04:46 PM     No growth 24 hours. No growth 48 hours. No growth at 5 days       Blood culture #2:No results found for: \"BLOODCULT2\"    Organism:    Lab Results   Component Value Date/Time    LABGRAM  12/17/2023 04:00 PM     Moderate segmented neutrophils observed. Rare epithelial cells observed. Moderate gram negative bacilli. Few budding yeast.       MRSA culture only:No results found for: \"MRSAC\"    Urine culture: No results found for: \"LABURIN\"  Lab Results   Component Value Date/Time    ORG Pseudomonas aeruginosa 12/17/2023 04:00 PM        Respiratory culture: No results found for: \"CULTRESP\"    Aerobic and Anaerobic :  Lab Results   Component Value Date/Time    LABAERO No Acinetobacter species isolated. 12/12/2023 12:07 PM     No results found for: \"LABANAE\"    Urinalysis:      Lab Results   Component Value Date/Time    NITRU NEGATIVE 12/17/2023 05:00 PM    WBCUA 2-4 12/17/2023 05:00 PM    BACTERIA NONE SEEN 12/17/2023 05:00 PM    RBCUA  12/17/2023 05:00 PM    BLOODU MODERATE 12/17/2023 05:00 PM    SPECGRAV 1.017 12/17/2023 05:00 PM       Radiology:-  XR CHEST PORTABLE    Result Date: 12/8/2023  Single view of the chest. Findings: Heart is enlarged. There is marked diffuse interstitial thickening with ill-defined airspace disease bilaterally. Probable bilateral pleural effusions.

## 2024-01-02 NOTE — CARE COORDINATION
Collaborative Discharge Planning    Neftali Hazel  :  1935  MRN:  [id number]    ADMIT DATE:  2023      Discharge Planning Discharge Planning  Type of Residence: House  Living Arrangements: Children  Support Systems: Children  Current Services Prior To Admission: Oxygen Therapy (states he has O2 prn at home from [employer])  Potential Assistance Needed: Home Care, Durable Medical Equipment  Potential DME Needed: Oxygen Therapy (Comment) (increased need?)  DME Ordered?: No  Potential Assistance Purchasing Medications: No  Type of Home Care Services: Nursing Services, PT, OT  Patient expects to be discharged to:: House  One/Two Story Residence: Two story, live on first floor  White Board Notes /Social Work Whiteboard Notes  /Social Work Whiteboard:  CM; plans home w praveena Lindsay, has [employer] oxygen PRN, therapy following    Discharge Plan Home with family  Plans home w praveena Lindsay, has [employer] oxygen as needed; therapy following  Discharge Milestones and Delays: Clinical status  From [facility] w Hypoxia s/p  Right Hip Revision w AB Spacer placed 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            
   12/10/23 0943   Service Assessment   Patient Orientation Alert and Oriented   Cognition Alert   History Provided By Patient   Primary Caregiver Self   Accompanied By/Relationship alone   Support Systems Children   Patient's Healthcare Decision Maker is: Legal Next of Kin   PCP Verified by CM Yes   Last Visit to PCP Within last 3 months   Prior Functional Level Independent in ADLs/IADLs   Current Functional Level Other (see comment)  (to be determined)   Can patient return to prior living arrangement Unknown at present   Ability to make needs known: Good   Family able to assist with home care needs: Yes   Would you like for me to discuss the discharge plan with any other family members/significant others, and if so, who? No   Financial Resources Medicare   Community Resources None   Discharge Planning   Type of Residence House   Living Arrangements Children   Current Services Prior To Admission Oxygen Therapy  (states he has O2 prn at home from [employer])   Potential Assistance Needed Home Care;Durable Medical Equipment   Potential DME Needed Oxygen Therapy (Comment)  (increased need?)   DME Ordered? No   Potential Assistance Purchasing Medications No   Type of Home Care Services Nursing Services;PT;OT   Patient expects to be discharged to: House   One/Two Story Residence Two story, live on first floor   Services At/After Discharge   Transition of Care Consult (CM Consult) Discharge Planning   Services At/After Discharge None   Mode of Transport at Discharge Other (see comment)  (family)   Confirm Follow Up Transport Family     Patient Goals/Plan/Treatment Preferences: Neftali lives at home with his son. He was independent with his care at home. He states he has oxygen from [employer] as needed. He was tearful about wanting to be able to return home with his son and be independent in his care.   Continue to follow for clinical progress, possible increase in O2 needs or HH services.     If patient is discharged prior to 
1/2/24, 8:10 AM EST  DISCHARGE PLANNING EVALUATION    Son called Friday afternoon to another  and offered 3 ECF choices; [facility] ([facility]), [facility], and [facility].     No referrals made at this time, await hospice consult/meeting.     
1/2/24, 8:11 AM EST    DISCHARGE BARRIERS        Patient transferred to  . Report given to unit SW, Kayce NOWAK, regarding discharge plan for this patient.    
12/13/23, 3:15 PM EST    DISCHARGE ON GOING EVALUATION    [facility] day: 5  Location: -13/013-A Reason for admit: Acute hypoxic respiratory failure (HCC) [J96.01]  Acute respiratory failure with hypoxia (HCC) [J96.01]   Procedure:   12/10 Renal US: Increased bilateral renal echogenicity consistent with chronic disease; No hydronephrosis or acute abnormality  12/10 BLE Venous doppler: Thrombus in the bilateral peroneal and left anterior tibial veins   12/10 CT Chest:  Small bilateral pleural effusions and bilateral pleural thickening; Extensive bilateral fibrosis; Cardiomegaly  12/11 VQ Scan: Nondiagnostic lung scan for pulmonary embolism. If there is clinical concern for pulmonary embolism, CTA of the chest is recommended for further evaluation.   12/12 US Chest: There is only a very small amount of pleural fluid present within the right pleural space. This is insufficient for thoracentesis at this time.   12/12 Intubated  12/12 CXR: 1. Diffuse bilateral parenchymal opacities are seen throughout the lungs bilaterally. There is slightly improved aeration of the lungs when compared to prior examination.  2. There has been interval intubation with the distal tip overlying the trachea approximately 6.2 cm above the jorge.  3. There is an enteric tube coursing below the level of the diaphragm with the tip overlying the gastric body.  4. There is thickening of the right lateral pleural stripe and mild blunting of right costophrenic angle consistent with a small loculated right pleural effusion. This is unchanged from prior examination.    Barriers to Discharge: Remains on vent w/ETT on AC/PC, peep 6, FIO2 30%, sats 88%. Afebrile. SB 40's. Follows commands x4. Dietitian and Palliative Care following. SLP/PT/OT. Intensivist, Nephrology, and Ortho following. Respiratory culture +EColi. Telemetry, OG w/TF, melchor. IVF, precedex @ 0.3 mcg/kg/hr, heparin drip, diprivan @ 30 mcg/kg/min, norvasc, asa, IV 
12/15/23, 3:09 PM EST    DISCHARGE ON GOING EVALUATION    [facility] day: 7  Location: [facility]13/013-A Reason for admit: Acute hypoxic respiratory failure (HCC) [J96.01]  Acute respiratory failure with hypoxia (HCC) [J96.01]   Procedure:   12/10 Renal US: Increased bilateral renal echogenicity consistent with chronic disease; No hydronephrosis or acute abnormality  12/10 BLE Venous doppler: Thrombus in the bilateral peroneal and left anterior tibial veins   12/10 CT Chest:  Small bilateral pleural effusions and bilateral pleural thickening; Extensive bilateral fibrosis; Cardiomegaly  12/11 VQ Scan: Nondiagnostic lung scan for pulmonary embolism. If there is clinical concern for pulmonary embolism, CTA of the chest is recommended for further evaluation.   12/12 US Chest: There is only a very small amount of pleural fluid present within the right pleural space. This is insufficient for thoracentesis at this time.   12/12 Intubated  12/15 CXR: No significant interval change     Barriers to Discharge: Yesterday afternoon, patient had RR mid 40's and anxious. Ativan and Morphine given IV with no relief. Sats dropped to 82% with 100% FIO2; increased sedation. This morning, sedation decreased to work with therapy for early mobility and was increased again after therapy. Plan to keep on vent over weekend, possible try extubation early next week.     Remains on vent w/ETT on AC/PC, peep 6, FIO2 30%, sats 88%. Tmax 99.5. Aflutter 60's. Unable to follow commands; SALINAS x4 to painful stim. Dietitian and Palliative Care following. SLP/PT/OT. Intensivist, Nephrology, and Ortho following. Respiratory culture +EColi. Telemetry, OG w/TF, melchor. IVF, heparin drip, diprivan @ 35 mcg/kg/min, norvasc, asa, IV rocephin, colace, neurontin, lantus, SSI, prevacid, prn IV ativan, prn IV morphine, prn percocet, glycolax, pravastatin, flomax, inhaler.     PCP: Brenda Prajapati MD  Readmission Risk Score: 19%  Patient 
12/18/23, 12:52 PM EST    DISCHARGE ON GOING EVALUATION    [facility] day: 10  Location: [facility]13/013-A Reason for admit: Acute hypoxic respiratory failure (HCC) [J96.01]  Acute respiratory failure with hypoxia (HCC) [J96.01]   Procedure:   12/10 Renal US: Increased bilateral renal echogenicity consistent with chronic disease; No hydronephrosis or acute abnormality  12/10 BLE Venous doppler: Thrombus in the bilateral peroneal and left anterior tibial veins   12/10 CT Chest:  Small bilateral pleural effusions and bilateral pleural thickening; Extensive bilateral fibrosis; Cardiomegaly  12/11 VQ Scan: Nondiagnostic lung scan for pulmonary embolism. If there is clinical concern for pulmonary embolism, CTA of the chest is recommended for further evaluation.   12/12 US Chest: There is only a very small amount of pleural fluid present within the right pleural space. This is insufficient for thoracentesis at this time.   12/12 Intubated  12/15 CXR: No significant interval change   12/18 CXR: no significant interval change  Barriers to Discharge: Intensivist and nephro following. Respiratory culture 12/17: grew Pseudomonas aeruginosa.  Culture sensitivity still pending. Blood culture pending. Remains on vent with ETT: AC/PC, 25% fio2, peep 6. Propofol gtt. Heparin gtt. IV lasxi. IV zosyn. Nebs. Inhaler.  Norvasc. ASA.SSI. Lantus. Pain control. Currently not following commands for early mobility.   PCP: Brenda Prajapati MD  Readmission Risk Score: 19.8%  Patient Goals/Plan/Treatment Preferences: From home with sonNeftali. Has [employer] O2 PRN.  Plan pending clinical courses.                
12/20/23, 3:08 PM EST    DISCHARGE ON GOING EVALUATION    [facility] day: 12  Location: [facility]13/013-A Reason for admit: Acute hypoxic respiratory failure (HCC) [J96.01]  Acute respiratory failure with hypoxia (HCC) [J96.01]   Procedure:   12/10 Renal US: Increased bilateral renal echogenicity consistent with chronic disease; No hydronephrosis or acute abnormality  12/10 BLE Venous doppler: Thrombus in the bilateral peroneal and left anterior tibial veins   12/10 CT Chest:  Small bilateral pleural effusions and bilateral pleural thickening; Extensive bilateral fibrosis; Cardiomegaly  12/11 VQ Scan: Nondiagnostic lung scan for pulmonary embolism. If there is clinical concern for pulmonary embolism, CTA of the chest is recommended for further evaluation.   12/12 US Chest: There is only a very small amount of pleural fluid present within the right pleural space. This is insufficient for thoracentesis at this time.   12/12 Intubated  12/20 CXR: Stable abnormal chest.     Barriers to Discharge: Remains tachypneic and agitated when sedation decreased. Lots of thick secretions. Unable to extubate at this time. ATB's adjusted. Started free H20 flushes today.     Remains on vent w/ETT on SBT, FIO2 30%, sats 98%. Tmax 99.3. NSR. Unable to follow commands; SALINAS x4 to painful stim. Dietitian and Palliative Care following. SLP/PT/OT. Intensivist, Nephrology, and Ortho following. Respiratory culture +EColi. Telemetry, OG w/TF, melchor. Heparin drip, diprivan @ 15 mcg/kg/min, norvasc, asa, IV cefepime, catapres, colace, neurontin, lantus, SSI, prevacid, prn IV morphine, prn percocet, glycolax, K+, pravastatin, flomax, inhaler, Electrolyte replacement protocols.      PCP: Brenda Prajapati MD  Readmission Risk Score: 19.7%  Patient Goals/Plan/Treatment Preferences: From home with sonNeftali. Has [employer] O2 PRN.  Plan pending clinical courses.                 
12/21/23, 4:00 PM EST    DISCHARGE ON GOING EVALUATION    [facility] day: 13  Location: [facility]13/013-A Reason for admit: Acute hypoxic respiratory failure (HCC) [J96.01]  Acute respiratory failure with hypoxia (HCC) [J96.01]   Procedure:   12/10 Renal US: Increased bilateral renal echogenicity consistent with chronic disease; No hydronephrosis or acute abnormality  12/10 BLE Venous doppler: Thrombus in the bilateral peroneal and left anterior tibial veins   12/10 CT Chest:  Small bilateral pleural effusions and bilateral pleural thickening; Extensive bilateral fibrosis; Cardiomegaly  12/11 VQ Scan: Nondiagnostic lung scan for pulmonary embolism. If there is clinical concern for pulmonary embolism, CTA of the chest is recommended for further evaluation.   12/12 US Chest: There is only a very small amount of pleural fluid present within the right pleural space. This is insufficient for thoracentesis at this time.   12/12 Intubated  12/21 CXR: No significant interval change.     Barriers to Discharge: Extubated today to bipap 14/6. Now on HFNC 50L, 35% FIO2, sat 100%. Tmax 99.7. NSR. Disoriented x4. Follows commands. Dietitian and Palliative Care following. SLP/PT/OT. Intensivist, Nephrology, and Ortho following. Respiratory culture +Pseudomonas aeruginosa. Telemetry, melchor. Precedex @ 0.3 mcg/kghr, Heparin drip, norvasc, asa, IV cefepime, catapres, colace, neurontin, lantus, SSI, prevacid, prn IV morphine, prn percocet, glycolax, K+, pravastatin, nebs, flomax, inhaler, Electrolyte replacement protocols. BUN 78, Creat 2.8, wbc 12.2, hgb 6.9.     PCP: Brenda Prajapati MD  Readmission Risk Score: 19.6%  Patient Goals/Plan/Treatment Preferences: From home with sonNeftali. Has [employer] O2 PRN. Will need SNF; SW consulted. Will require precert.                   
12/22/23, 4:11 PM EST    DISCHARGE ON GOING EVALUATION    [facility] day: 14  Location: [facility]13/013-A Reason for admit: Acute hypoxic respiratory failure (HCC) [J96.01]  Acute respiratory failure with hypoxia (HCC) [J96.01]   Procedure:   12/10 Renal US: Increased bilateral renal echogenicity consistent with chronic disease; No hydronephrosis or acute abnormality  12/10 BLE Venous doppler: Thrombus in the bilateral peroneal and left anterior tibial veins   12/10 CT Chest:  Small bilateral pleural effusions and bilateral pleural thickening; Extensive bilateral fibrosis; Cardiomegaly  12/11 VQ Scan: Nondiagnostic lung scan for pulmonary embolism. If there is clinical concern for pulmonary embolism, CTA of the chest is recommended for further evaluation.   12/12 US Chest: There is only a very small amount of pleural fluid present within the right pleural space. This is insufficient for thoracentesis at this time.   12/12 Intubated    Barriers to Discharge: Pt was restless/agitated overnight and received ativan. This morning pt was not able to awaken. Romazicon given and pt able to follow commands. Later in day, did not follow commands, but still awake. Remains on HFNC 40L, 30% FIO2, sat 95%. Tmax 100. Afib 90's. Unable to follow commands; SALINAS x4 to painful stim. Uncomprehensible speech. Dietitian and Palliative Care following. SLP/PT/OT. Intensivist, Nephrology, and Ortho following. Respiratory culture +Pseudomonas aeruginosa. Telemetry, melchor. Heparin drip, norvasc, asa, IV cefepime, catapres, colace, neurontin, lantus, SSI, prevacid, prn percocet, zyprexa, glycolax, K+, pravastatin, nebs, flomax, inhaler, Electrolyte replacement protocols.  PO meds held today d/t NPO.     PCP: Brenda Prajapati MD  Readmission Risk Score: 19%  Patient Goals/Plan/Treatment Preferences: From home with sonNeftali. Has [employer] O2 PRN. Will need SNF; SW consulted. Will require precert.                    
12/22/23, 8:50 AM EST    DISCHARGE PLANNING EVALUATION    This SW did attempt to see patient, sleeping at this time with high flow on. No family in room, will attempt again later.       1:46 PM- Patient is now not following commands and is agitated this afternoon. Left a voicemail for son Neftali.   
12/26/23, 12:09 PM EST    DISCHARGE PLANNING EVALUATION    This SW did attempt to see patient, RN states that patient is confused this morning and it would be best to call family in a little bit.    1:57 PM- Left a message with patient's son Neftali, asked for a call back.       DISCHARGE PLANNING EVALUATION  12/26/23, 2:28 PM EST    Reason for Referral: Needs SNF  Mental Status: Patient is confused, spoke with son Neftali   Decision Making: Son Neftali is making decisions at this time  Family/Social/Home Environment: Lives at home with son and granddaughter  Current Services including food security, transportation and housekeeping:  No current services, reports that patient was completely independent prior to admission other than driving due to previous hip issues.   Current Equipment:  Had a wheelchair PRN   Payment Source: [employer] Medicare  Concerns or Barriers to Discharge: Will need rehab before discharge to home  Post-acute (PAC) provider list was provided to patient. Patient was informed of their freedom to choose PAC provider. Discussed and offered to show the patient the relevant PAC Providers quality and resource use measures on Medicare Compare web site via computer based on patient's goals of care and treatment preferences. Questions regarding selection process were answered.      Teach Back Method used with Neftali regarding care plan and discharge planning  Patient's son verbalized understanding of the plan of care and contribute to goal setting.       Patient goals, treatment preferences and discharge plan: Plan pending clinical course at this time. Patient's son is in agreement that patient will need rehab before returning to home, however he is wanting patient to be evaluated for IPR first because, \"if I have to tell him he's going to any kind of nursing home, he'll freak out\". LAMAR did update CM Kassi and asked for an IPR consult. LAMAR will continue to follow.     Electronically signed by Rose 
12/27/23, 4:27 PM EST    DISCHARGE ON GOING EVALUATION    [facility] day: 19  Location: 3A-11/011-A Reason for admit: Acute hypoxic respiratory failure (HCC) [J96.01]  Acute respiratory failure with hypoxia (HCC) [J96.01]   Procedure:   12/10 Renal US: Increased bilateral renal echogenicity consistent with chronic disease; No hydronephrosis or acute abnormality  12/10 BLE Venous doppler: Thrombus in the bilateral peroneal and left anterior tibial veins   12/10 CT Chest:  Small bilateral pleural effusions and bilateral pleural thickening; Extensive bilateral fibrosis; Cardiomegaly  12/11 VQ Scan: Nondiagnostic lung scan for pulmonary embolism. If there is clinical concern for pulmonary embolism, CTA of the chest is recommended for further evaluation.   12/12 US Chest: There is only a very small amount of pleural fluid present within the right pleural space. This is insufficient for thoracentesis at this time.   12/12 Intubated  12/21 Extubated  12/23 CT Abd/pelvis/right hip: There is a small amount of strandy opacity within the presacral region of indeterminate etiology which could represent a possible small collection of fluid or hematoma versus scarring; There are multiple loculated mixed attenuation areas involving the vastus lateralis as well as a loculated hypoechoic area within the posterior hamstring compartment causing mass effect upon the adjacent hamstring musculature, abductor loly and gracilis. These may represent multiloculated abscesses versus hematomas. The area posteriorly is incompletely visualized along its inferior extent. The loculated heterogeneous collection along the posterior compartment measures approximately 12 x 10 x 8 cm. The heterogeneous area along the lateral aspect of the proximal right femur in the region of the vastus lateralis measures approximately 6 x 10 cm in oblique AP and transverse dimensions respectively and measures approximately 11 cm in the 
12/27/23, 7:45 AM EST    DISCHARGE BARRIERS        Patient transferred to 3A 11. Report given to unit SW, Farheen DEWITT, regarding discharge plan for this patient.   
12/28/23, 11:28 AM EST    DISCHARGE PLANNING EVALUATION    SW met with pt, son and granddaughter.  SW advised that pt does not meet criteria for IP Rehab.  SW provided son Neftali with list of in-network facilities in the [facility].  SW requested they choose their top 3 preferences.  SW provided Neftali with SW number to call when they have chosen facilities.    Pt will require pre-cert.   
12/28/23, 2:36 PM EST    DISCHARGE ON GOING EVALUATION    [facility] day: 20  Location: 3A-11/011-A Reason for admit: Acute hypoxic respiratory failure (HCC) [J96.01]  Acute respiratory failure with hypoxia (HCC) [J96.01]   Procedure:   12/10 Renal US: Increased bilateral renal echogenicity consistent with chronic disease; No hydronephrosis or acute abnormality  12/10 BLE Venous doppler: Thrombus in the bilateral peroneal and left anterior tibial veins   12/10 CT Chest:  Small bilateral pleural effusions and bilateral pleural thickening; Extensive bilateral fibrosis; Cardiomegaly  12/11 VQ Scan: Nondiagnostic lung scan for pulmonary embolism. If there is clinical concern for pulmonary embolism, CTA of the chest is recommended for further evaluation.   12/12 US Chest: There is only a very small amount of pleural fluid present within the right pleural space. This is insufficient for thoracentesis at this time.   12/12 Intubated  12/21 Extubated  12/23 CT Abd/pelvis/right hip: There is a small amount of strandy opacity within the presacral region of indeterminate etiology which could represent a possible small collection of fluid or hematoma versus scarring; There are multiple loculated mixed attenuation areas involving the vastus lateralis as well as a loculated hypoechoic area within the posterior hamstring compartment causing mass effect upon the adjacent hamstring musculature, abductor loly and gracilis. These may represent multiloculated abscesses versus hematomas. The area posteriorly is incompletely visualized along its inferior extent. The loculated heterogeneous collection along the posterior compartment measures approximately 12 x 10 x 8 cm. The heterogeneous area along the lateral aspect of the proximal right femur in the region of the vastus lateralis measures approximately 6 x 10 cm in oblique AP and transverse dimensions respectively and measures approximately 11 cm in the 
  craniocaudal dimension.  12/26 CT Right femur: Again seen is a large 28.3 x 10.8 x 8.1 cm hematoma encompassing the right gluteal and right posterior thigh region. There has been no significant interval change in appearance.  12/29 MBS: Laryngeal penetration and aspiration.    Barriers to Discharge: Hospitalist, Ortho, Nephrology following. Ortho signed off today. Palliative Care. Resp culture: Pseudomonas aeruginosa, Candida parapsilosis. MBS done today, SLP recommending STRICT NPO. Dietitian following, NGT with tube feeds. Tmax 100.0F. Weaned off HF O2 to 6L/nc today. Sats 98-99%. Mar. BUN 85 (84), creatinine 3.4 (3.4). WBC 13.5. Hgb 8.2. Bumex 1mg iv bid. Heparin gtt. Diabetes management. Pain control.     PCP: Brenda Prajapati MD  Readmission Risk Score: 21%  Patient Goals/Plan/Treatment Preferences: From home with sonNeftali. Has home O2 through [employer], uses prn. Planning SNF. SW following. Precert to go to SNF.

## 2024-01-03 PROBLEM — R06.03 RESPIRATORY DISTRESS: Status: ACTIVE | Noted: 2024-01-01

## 2024-01-03 PROBLEM — R69 LATE STAGE TERMINAL ILLNESS: Status: ACTIVE | Noted: 2024-01-01

## 2024-01-03 PROBLEM — Z51.5 HOSPICE CARE PATIENT: Status: ACTIVE | Noted: 2024-01-01

## 2024-01-03 NOTE — PROGRESS NOTES
[facility]  Notice of Patient Passing      Patient Name- Neftali Hazel   Acct Number- [id number]   Attending Physician- Kelvin Jaramillo MD    Admitted on-1/2/2024 11:57 AM     On 1/3/2024 at 1244 patient was found in 5K04 with:   Absence of vital signs.   Absence of neurological response.    Confirmed time of death at 1244.   Physician or On-call Physician notified of time of death- yes    Family present at time of death- no   Spiritual care present at time of death- no    Physician was notified and orders were obtained to release the body.   Post-Mortem documentation completed; form printed, signed, and given to admitting.    Tanisha Cruz RN RN Nursing Supervisor/ Manager  1/3/24   1:12 PM    ________________________________________________________________________          Pt Name: Neftali Hazel   Address: [address] [place] [address]  Phone: [phone] (home)        MRN: [id number]    AGE: 88 y.o.   YOB: 1935       GENDER: male     Primary Care Physician: Brenda Prajapati MD   Attending Physician Name: Dr. Kelvin Jaramillo    Date of Death: 01/03/24  Time of Death: 1244  Pronounced By: Dr. Kelvin Jaramillo      Emergency Contact:   Name of Family Notified of Death: Neftali Hazel   Relationship to Patient: Son   Phone Number: [phone]      Cause of Death: acute hypoxemic respiratory failure    Co-Morbidities:  Patient Active Problem List   Diagnosis    Acute hypoxic respiratory failure (HCC)    Acute respiratory failure with hypoxia (HCC)    Chronic systolic CHF (congestive heart failure) (HCC)    ILD (interstitial lung disease) (HCC)    Pleural effusion, bilateral    Renal failure syndrome    Essential hypertension    Hyperkalemia    RAMANDEEP (acute kidney injury) (HCC)    ATN (acute tubular necrosis) (HCC)    Hypokalemia    Isolated non-nephrotic proteinuria    Hypernatremia    Acute hypoxemic respiratory failure ([facility])    Hospice 
Patient transitioned to [facility] hospice level of care with son signing paperwork with this nurse. Continued support provided. Did let son know that patient will likely transfer to [facility] for hospice care. Voiced understanding and agreeable to transition.   During admission patient with noted dyspnea with RDOS of 7 and pain with CPOT of 2. Fentanyl 50 mcg and ativan 0.5 mg administered IV for sudden onset of symptoms.   Told son to let staff know if patient appearing uncomfortable at anytime. Voiced understanding of this.   Primary nurse Saumya RN denied need for assistance with patient care at this time.     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   Dyspnea Scale: RDOS OF 7 during admission      Graphics:   BP (!) 110/44   Pulse 77   Temp 99.2 °F (37.3 °C) (Axillary)   Resp (!) 37   SpO2 96%     Assessment (Head to Toe):   RESPIRATORY:  positive for  dyspnea, with rhonchi upper lobes   CARDIOVASCULAR:  tachycardia, heart sounds diminished   GASTROINTESTINAL:  bowel sounds hypoactive with abdomen soft, non -tender   GENITOURINARY:  melchor catheter patent with hazy, yellow urine draining     Current Medications:    Current Facility-Administered Medications: LORazepam (ATIVAN) injection 0.5 mg, 0.5 mg, IntraVENous, Q1H PRN  fentaNYL (SUBLIMAZE) injection 50 mcg, 50 mcg, IntraVENous, Q1H PRN  glycopyrrolate (ROBINUL) injection 0.2 mg, 0.2 mg, IntraVENous, Q2H PRN     Medication Adjustments: Patient transition to comfort medications only. 
Updated by  that patient not breathing. Visit to room, patient absent of vital signs and neurological function. This nurse and Falguni Kimbrough RN verified at 1244.   Call placed to son with updated of father's passing, support provide. He and other family are not coming to hospital, okay with  home being called. Confirmed  home being [place], [place].   Updated Dr. Jaramillo and house supervisor of patient passing.   
Visit made to assess symptoms, provide support, and have financial form signed by son(had to await form to be generated). Neftali resting in bed peacefully with eyes closed, received dose of Fentanyl 50 mcg at 1717 with good effect for RDOS of 4, prior dose at 1432. Will monitor for needs for dyspnea/pain medications to see for need of adjustments or infusion at some point.   CHEO Lindsay Jr. Feels patient comfortable, financial form signed. Support provided.   Primary nurse denied needs.   
    Continue current plan of care  Continue Fentanyl IV Drip at 75 mcg/h titrating as needed for pain, shortness of breath, CPOT greater than 0 and RDOS greater than 2   Continue Fentanyl IV 50 mcg every 1 hour as needed for breakthrough pain, shortness of breath, and CPOT greater than 0 and RDOS greater than 2  Increase Ativan IV To 1 mg every 1 hour as needed for  anxiety, agitation, terminal restlessness, and shortness of breath not controlled on opioids  Continue Glycopyrrolate 0.2 mg every 2 hours as needed for  terminal secretions  Please give IV opioids prior to patient care  Hospice is working to develop and provide a plan for safe discharge to lower level of care  DVT prophylaxis: none indicated due to Hospice/end of life care  Please [employer] or call the Hospice team with any symptom concerns or questions    CURRENT CODE STATUS:  DNR-CC No additional code details    Hospice General Inpatient Level of Care Statement   Hospice Terminal Diagnosis: Acute hypoxic respiratory failure    This patient does meets Hospice General Inpatient (GIP) Level of Care.    The need for GIP level of care is pain, dyspnea, respiratory distress , respiratory failure, terminal restlessness, and end of life care.    The interventions tried that have been unsuccessful at controlling symptoms include: Frequent administration of IV comfort medications and  starting IV fentanyl drip    Supporting documentation for GIP need for symptom control:  Sudden decline necessitating intensive nursing intervention  Frequent evaluation by a doctor, nurse practitioner, nurse  Frequent medication adjustment  Medications that cannot be administered at home such via an IV  Aggressive symptom management  Complicated technical delivery of medications  Uncontrolled/intractable pain  Uncontrolled/intractable dyspnea  Unmanageable respiratory distress  Unmanageable respiratory failure  Uncontrolled symptoms as described and/or ongoing clinical

## 2024-01-03 NOTE — PLAN OF CARE
Problem: Pain  Goal: Verbalizes/displays adequate comfort level or baseline comfort level  Outcome:   Problem: Chronic Conditions and Co-morbidities  Goal: Patient's chronic conditions and co-morbidity symptoms are monitored and maintained or improved  Outcome: Progressing  Flowsheets (Taken 1/3/2024 0057)  Care Plan - Patient's Chronic Conditions and Co-Morbidity Symptoms are Monitored and Maintained or Improved:   Monitor and assess patient's chronic conditions and comorbid symptoms for stability, deterioration, or improvement   Collaborate with multidisciplinary team to address chronic and comorbid conditions and prevent exacerbation or deterioration   Update acute care plan with appropriate goals if chronic or comorbid symptoms are exacerbated and prevent overall improvement and discharge     Problem: Discharge Planning  Goal: Discharge to home or other facility with appropriate resources  Outcome: Progressing  Flowsheets (Taken 1/3/2024 0057)  Discharge to home or other facility with appropriate resources:   Identify barriers to discharge with patient and caregiver   Arrange for needed discharge resources and transportation as appropriate   Identify discharge learning needs (meds, wound care, etc)   Refer to discharge planning if patient needs post-hospital services based on physician order or complex needs related to functional status, cognitive ability or social support system     Problem: Safety - Adult  Goal: Free from fall injury  Outcome: Progressing  Flowsheets (Taken 1/3/2024 0057)  Free From Fall Injury:   Instruct family/caregiver on patient safety   Based on caregiver fall risk screen, instruct family/caregiver to ask for assistance with transferring infant if caregiver noted to have fall risk factors   Progressing  Flowsheets (Taken 1/3/2024 0057)  Verbalizes/displays adequate comfort level or baseline comfort level:   Encourage patient to monitor pain and request assistance   Administer 
resources:   Identify barriers to discharge with patient and caregiver   Arrange for needed discharge resources and transportation as appropriate   Identify discharge learning needs (meds, wound care, etc)   Refer to discharge planning if patient needs post-hospital services based on ph  Problem: Safety - Medical Restraint  Goal: Remains free of injury from restraints (Restraint for Interference with Medical Device)  Description: INTERVENTIONS:  1. Determine that other, less restrictive measures have been tried or would not be effective before applying the restraint  2. Evaluate the patient's condition at the time of restraint application  3. Inform patient/family regarding the reason for restraint  4. Q2H: Monitor safety, psychosocial status, comfort, nutrition and hydration  Outcome: Progressing     Problem: Safety - Adult  Goal: Free from fall injury  1/3/2024 0450 by Patricia Gray RN  Outcome: Progressing  Flowsheets (Taken 1/3/2024 0450)  Free From Fall Injury: Instruct family/caregiver on patient safety  1/3/2024 0057 by Drea Salas RN  Outcome: Progressing  Flowsheets (Taken 1/3/2024 0057)  Free From Fall Injury:   Instruct family/caregiver on patient safety   Based on caregiver fall risk screen, instruct family/caregiver to ask for assistance with transferring infant if caregiver noted to have fall risk factors     Problem: Pain  Goal: Verbalizes/displays adequate comfort level or baseline comfort level  1/3/2024 0450 by Patricia Gray RN  Outcome: Progressing  Flowsheets (Taken 1/3/2024 0450)  Verbalizes/displays adequate comfort level or baseline comfort level:   Encourage patient to monitor pain and request assistance   Assess pain using appropriate pain scale   Administer analgesics based on type and severity of pain and evaluate response   Implement non-pharmacological measures as appropriate and evaluate response  1/3/2024 0057 by Drea Salas, RN  Outcome: Not Progressing  Flowsheets

## 2024-01-03 NOTE — CARE COORDINATION
CM Note  Client w 5K Transfer: Hand-Off given to NORMA Cali CM  Electronically signed by Hernan Childers RN on 1/3/2024 at 6:54 AM

## 2024-01-03 NOTE — DISCHARGE SUMMARY
[facility] Death Summary    Date: 1/3/2024  Name: Neftali Hazel  MRN: [id number]  YOB: 1935     Patient's PCP: Brenda Prajapati MD  Admit Date: 1/2/2024 to General Inpatient Hospice  Date of Death: 1/3/2024  at 1244  Acute care admission: 12/8/23-1/2/23  Admitting Physician: Kelvin Jaramillo MD   Discharge Physician: Kelvin Jaramillo MD   Consultation: none during General Inpatient Hospice stay    Invasive procedures: none during General Inpatient Hospice stay    Discharge Diagnoses:   Principal Problem:    Acute hypoxemic respiratory failure (HCC)  Active Problems:    Chronic systolic CHF (congestive heart failure) ([facility])    ILD (interstitial lung disease) ([facility])    Renal failure syndrome    ATN (acute tubular necrosis) ([facility])    Hospice care patient    Respiratory distress    Late stage terminal illness  Resolved Problems:    * No resolved hospital problems. *      Brief HPI: Please refer to H&P for full HPI.  Briefly, Patient initially presented to [facility] as a transfer from [facility] on December 8, 2023.  He had a antibiotic spacer placed after prosthetic joint fraction.  He presented to [facility] for revision of his arthroplasty on November 27, 2023.  He experienced postoperative hypoxia necessitating taking high flow nasal cannula.  Outside hospital had difficulty weaning off of this.  The decision was then made to transfer him to [facility].  A rapid response was called on December 12, 2023 and he was placed back onto high flow nasal cannula.  He was then transferred to the ICU for further care.  He was intubated on December 8, 2023 and was extubated on December 21, 2023.  He was then transferred out of the ICU on December 27, 2023.  He can his condition continued to decline over the next several days.  His family made the decision to transition to comfort care.  Due to his uncontrolled symptoms we will admit him [facility]

## 2024-01-05 ENCOUNTER — TELEPHONE (OUTPATIENT)
Dept: PALLATIVE CARE | Age: 89
End: 2024-01-05

## 2024-01-17 PROBLEM — J18.9 PNEUMONIA DUE TO INFECTIOUS ORGANISM: Status: ACTIVE | Noted: 2024-01-17
